# Patient Record
Sex: MALE | Race: BLACK OR AFRICAN AMERICAN | Employment: OTHER | URBAN - METROPOLITAN AREA
[De-identification: names, ages, dates, MRNs, and addresses within clinical notes are randomized per-mention and may not be internally consistent; named-entity substitution may affect disease eponyms.]

---

## 2017-01-01 ENCOUNTER — APPOINTMENT (OUTPATIENT)
Dept: GENERAL RADIOLOGY | Age: 77
DRG: 190 | End: 2017-01-01
Attending: NURSE PRACTITIONER
Payer: MEDICARE

## 2017-01-01 ENCOUNTER — APPOINTMENT (OUTPATIENT)
Dept: GENERAL RADIOLOGY | Age: 77
DRG: 190 | End: 2017-01-01
Attending: INTERNAL MEDICINE
Payer: MEDICARE

## 2017-01-01 ENCOUNTER — APPOINTMENT (OUTPATIENT)
Dept: GENERAL RADIOLOGY | Age: 77
DRG: 190 | End: 2017-01-01
Attending: EMERGENCY MEDICINE
Payer: MEDICARE

## 2017-01-01 ENCOUNTER — HOSPITAL ENCOUNTER (OUTPATIENT)
Dept: GENERAL RADIOLOGY | Age: 77
Discharge: HOME | End: 2017-08-16
Payer: MEDICARE

## 2017-01-01 ENCOUNTER — HOSPITAL ENCOUNTER (INPATIENT)
Age: 77
LOS: 5 days | Discharge: HOME HEALTH | DRG: 190 | End: 2017-07-06
Attending: EMERGENCY MEDICINE | Admitting: INTERNAL MEDICINE
Payer: MEDICARE

## 2017-01-01 VITALS
RESPIRATION RATE: 18 BRPM | WEIGHT: 199.4 LBS | OXYGEN SATURATION: 95 % | SYSTOLIC BLOOD PRESSURE: 117 MMHG | TEMPERATURE: 98.9 F | HEIGHT: 75 IN | HEART RATE: 79 BPM | DIASTOLIC BLOOD PRESSURE: 63 MMHG | BODY MASS INDEX: 24.79 KG/M2

## 2017-01-01 DIAGNOSIS — J43.2 CENTRILOBULAR EMPHYSEMA (HCC): Chronic | ICD-10-CM

## 2017-01-01 DIAGNOSIS — R09.02 HYPOXEMIA: ICD-10-CM

## 2017-01-01 DIAGNOSIS — J18.9 COMMUNITY ACQUIRED PNEUMONIA: Primary | ICD-10-CM

## 2017-01-01 DIAGNOSIS — J18.9 CAP (COMMUNITY ACQUIRED PNEUMONIA): ICD-10-CM

## 2017-01-01 DIAGNOSIS — I95.1 ORTHOSTATIC HYPOTENSION: ICD-10-CM

## 2017-01-01 DIAGNOSIS — I25.5 ISCHEMIC CARDIOMYOPATHY: Chronic | ICD-10-CM

## 2017-01-01 DIAGNOSIS — R29.6 FREQUENT FALLS: ICD-10-CM

## 2017-01-01 DIAGNOSIS — I50.40 COMBINED SYSTOLIC AND DIASTOLIC CONGESTIVE HEART FAILURE, UNSPECIFIED CONGESTIVE HEART FAILURE CHRONICITY: Chronic | ICD-10-CM

## 2017-01-01 LAB
ALBUMIN SERPL BCP-MCNC: 3.4 G/DL (ref 3.2–4.6)
ALBUMIN/GLOB SERPL: 0.8 {RATIO} (ref 1.2–3.5)
ALP SERPL-CCNC: 51 U/L (ref 50–136)
ALT SERPL-CCNC: 36 U/L (ref 12–65)
ANION GAP BLD CALC-SCNC: 10 MMOL/L (ref 7–16)
ANION GAP BLD CALC-SCNC: 10 MMOL/L (ref 7–16)
APPEARANCE UR: CLEAR
AST SERPL W P-5'-P-CCNC: 42 U/L (ref 15–37)
ATRIAL RATE: 77 BPM
BACTERIA SPEC CULT: NORMAL
BACTERIA URNS QL MICRO: 0 /HPF
BASOPHILS # BLD AUTO: 0 K/UL (ref 0–0.2)
BASOPHILS # BLD AUTO: 0 K/UL (ref 0–0.2)
BASOPHILS # BLD: 0 % (ref 0–2)
BASOPHILS # BLD: 0 % (ref 0–2)
BILIRUB SERPL-MCNC: 1.5 MG/DL (ref 0.2–1.1)
BILIRUB UR QL: NEGATIVE
BNP SERPL-MCNC: 926 PG/ML
BUN SERPL-MCNC: 17 MG/DL (ref 8–23)
BUN SERPL-MCNC: 23 MG/DL (ref 8–23)
CALCIUM SERPL-MCNC: 8.4 MG/DL (ref 8.3–10.4)
CALCIUM SERPL-MCNC: 8.6 MG/DL (ref 8.3–10.4)
CALCULATED P AXIS, ECG09: 22 DEGREES
CALCULATED R AXIS, ECG10: -53 DEGREES
CALCULATED T AXIS, ECG11: -30 DEGREES
CASTS URNS QL MICRO: 0 /LPF
CHLORIDE SERPL-SCNC: 100 MMOL/L (ref 98–107)
CHLORIDE SERPL-SCNC: 105 MMOL/L (ref 98–107)
CO2 SERPL-SCNC: 24 MMOL/L (ref 21–32)
CO2 SERPL-SCNC: 26 MMOL/L (ref 21–32)
COLOR UR: ABNORMAL
CREAT SERPL-MCNC: 1.27 MG/DL (ref 0.8–1.5)
CREAT SERPL-MCNC: 1.66 MG/DL (ref 0.8–1.5)
DIAGNOSIS, 93000: NORMAL
DIFFERENTIAL METHOD BLD: ABNORMAL
DIFFERENTIAL METHOD BLD: ABNORMAL
EOSINOPHIL # BLD: 0 K/UL (ref 0–0.8)
EOSINOPHIL # BLD: 0 K/UL (ref 0–0.8)
EOSINOPHIL NFR BLD: 0 % (ref 0.5–7.8)
EOSINOPHIL NFR BLD: 0 % (ref 0.5–7.8)
EPI CELLS #/AREA URNS HPF: ABNORMAL /HPF
ERYTHROCYTE [DISTWIDTH] IN BLOOD BY AUTOMATED COUNT: 14.3 % (ref 11.9–14.6)
ERYTHROCYTE [DISTWIDTH] IN BLOOD BY AUTOMATED COUNT: 14.4 % (ref 11.9–14.6)
GLOBULIN SER CALC-MCNC: 4.1 G/DL (ref 2.3–3.5)
GLUCOSE BLD STRIP.AUTO-MCNC: 128 MG/DL (ref 65–100)
GLUCOSE BLD STRIP.AUTO-MCNC: 133 MG/DL (ref 65–100)
GLUCOSE BLD STRIP.AUTO-MCNC: 134 MG/DL (ref 65–100)
GLUCOSE BLD STRIP.AUTO-MCNC: 139 MG/DL (ref 65–100)
GLUCOSE BLD STRIP.AUTO-MCNC: 151 MG/DL (ref 65–100)
GLUCOSE BLD STRIP.AUTO-MCNC: 158 MG/DL (ref 65–100)
GLUCOSE BLD STRIP.AUTO-MCNC: 172 MG/DL (ref 65–100)
GLUCOSE BLD STRIP.AUTO-MCNC: 175 MG/DL (ref 65–100)
GLUCOSE BLD STRIP.AUTO-MCNC: 177 MG/DL (ref 65–100)
GLUCOSE BLD STRIP.AUTO-MCNC: 184 MG/DL (ref 65–100)
GLUCOSE BLD STRIP.AUTO-MCNC: 186 MG/DL (ref 65–100)
GLUCOSE BLD STRIP.AUTO-MCNC: 205 MG/DL (ref 65–100)
GLUCOSE BLD STRIP.AUTO-MCNC: 205 MG/DL (ref 65–100)
GLUCOSE BLD STRIP.AUTO-MCNC: 226 MG/DL (ref 65–100)
GLUCOSE BLD STRIP.AUTO-MCNC: 270 MG/DL (ref 65–100)
GLUCOSE BLD STRIP.AUTO-MCNC: 95 MG/DL (ref 65–100)
GLUCOSE SERPL-MCNC: 185 MG/DL (ref 65–100)
GLUCOSE SERPL-MCNC: 97 MG/DL (ref 65–100)
GLUCOSE UR STRIP.AUTO-MCNC: 100 MG/DL
GRAM STN SPEC: NORMAL
HCT VFR BLD AUTO: 33.9 % (ref 41.1–50.3)
HCT VFR BLD AUTO: 34.9 % (ref 41.1–50.3)
HGB BLD-MCNC: 11.5 G/DL (ref 13.6–17.2)
HGB BLD-MCNC: 11.9 G/DL (ref 13.6–17.2)
HGB UR QL STRIP: ABNORMAL
IMM GRANULOCYTES # BLD: 0 K/UL (ref 0–0.5)
IMM GRANULOCYTES # BLD: 0 K/UL (ref 0–0.5)
IMM GRANULOCYTES NFR BLD AUTO: 0.1 % (ref 0–5)
IMM GRANULOCYTES NFR BLD AUTO: 0.2 % (ref 0–5)
KETONES UR QL STRIP.AUTO: NEGATIVE MG/DL
LACTATE BLD-SCNC: 1.3 MMOL/L (ref 0.5–1.9)
LEUKOCYTE ESTERASE UR QL STRIP.AUTO: NEGATIVE
LYMPHOCYTES # BLD AUTO: 17 % (ref 13–44)
LYMPHOCYTES # BLD AUTO: 9 % (ref 13–44)
LYMPHOCYTES # BLD: 0.8 K/UL (ref 0.5–4.6)
LYMPHOCYTES # BLD: 1.2 K/UL (ref 0.5–4.6)
MAGNESIUM SERPL-MCNC: 2.2 MG/DL (ref 1.8–2.4)
MCH RBC QN AUTO: 24.6 PG (ref 26.1–32.9)
MCH RBC QN AUTO: 24.9 PG (ref 26.1–32.9)
MCHC RBC AUTO-ENTMCNC: 33.9 G/DL (ref 31.4–35)
MCHC RBC AUTO-ENTMCNC: 34.1 G/DL (ref 31.4–35)
MCV RBC AUTO: 72.6 FL (ref 79.6–97.8)
MCV RBC AUTO: 73.2 FL (ref 79.6–97.8)
MONOCYTES # BLD: 0.9 K/UL (ref 0.1–1.3)
MONOCYTES # BLD: 1 K/UL (ref 0.1–1.3)
MONOCYTES NFR BLD AUTO: 10 % (ref 4–12)
MONOCYTES NFR BLD AUTO: 13 % (ref 4–12)
NEUTS SEG # BLD: 4.9 K/UL (ref 1.7–8.2)
NEUTS SEG # BLD: 7.5 K/UL (ref 1.7–8.2)
NEUTS SEG NFR BLD AUTO: 70 % (ref 43–78)
NEUTS SEG NFR BLD AUTO: 81 % (ref 43–78)
NITRITE UR QL STRIP.AUTO: NEGATIVE
P-R INTERVAL, ECG05: 208 MS
PH UR STRIP: 5.5 [PH] (ref 5–9)
PLATELET # BLD AUTO: 115 K/UL (ref 150–450)
PLATELET # BLD AUTO: 134 K/UL (ref 150–450)
PMV BLD AUTO: ABNORMAL FL (ref 10.8–14.1)
PMV BLD AUTO: ABNORMAL FL (ref 10.8–14.1)
POTASSIUM SERPL-SCNC: 3.6 MMOL/L (ref 3.5–5.1)
POTASSIUM SERPL-SCNC: 3.8 MMOL/L (ref 3.5–5.1)
PROCALCITONIN SERPL-MCNC: 0.7 NG/ML
PROCALCITONIN SERPL-MCNC: 2 NG/ML
PROCALCITONIN SERPL-MCNC: 6.9 NG/ML
PROT SERPL-MCNC: 7.5 G/DL (ref 6.3–8.2)
PROT UR STRIP-MCNC: 100 MG/DL
Q-T INTERVAL, ECG07: 382 MS
QRS DURATION, ECG06: 120 MS
QTC CALCULATION (BEZET), ECG08: 432 MS
RBC # BLD AUTO: 4.67 M/UL (ref 4.23–5.67)
RBC # BLD AUTO: 4.77 M/UL (ref 4.23–5.67)
RBC #/AREA URNS HPF: ABNORMAL /HPF
SERVICE CMNT-IMP: NORMAL
SODIUM SERPL-SCNC: 136 MMOL/L (ref 136–145)
SODIUM SERPL-SCNC: 139 MMOL/L (ref 136–145)
SP GR UR REFRACTOMETRY: 1.02 (ref 1–1.02)
TROPONIN I SERPL-MCNC: 0.02 NG/ML (ref 0.02–0.05)
UROBILINOGEN UR QL STRIP.AUTO: 1 EU/DL (ref 0.2–1)
VENTRICULAR RATE, ECG03: 77 BPM
WBC # BLD AUTO: 7 K/UL (ref 4.3–11.1)
WBC # BLD AUTO: 9.3 K/UL (ref 4.3–11.1)
WBC URNS QL MICRO: ABNORMAL /HPF

## 2017-01-01 PROCEDURE — 94640 AIRWAY INHALATION TREATMENT: CPT

## 2017-01-01 PROCEDURE — 77010033678 HC OXYGEN DAILY

## 2017-01-01 PROCEDURE — 36415 COLL VENOUS BLD VENIPUNCTURE: CPT | Performed by: INTERNAL MEDICINE

## 2017-01-01 PROCEDURE — 71020 XR CHEST PA LAT: CPT

## 2017-01-01 PROCEDURE — 81001 URINALYSIS AUTO W/SCOPE: CPT | Performed by: INTERNAL MEDICINE

## 2017-01-01 PROCEDURE — 74011250636 HC RX REV CODE- 250/636: Performed by: INTERNAL MEDICINE

## 2017-01-01 PROCEDURE — 80053 COMPREHEN METABOLIC PANEL: CPT | Performed by: EMERGENCY MEDICINE

## 2017-01-01 PROCEDURE — 99232 SBSQ HOSP IP/OBS MODERATE 35: CPT | Performed by: INTERNAL MEDICINE

## 2017-01-01 PROCEDURE — 94760 N-INVAS EAR/PLS OXIMETRY 1: CPT

## 2017-01-01 PROCEDURE — 74011250636 HC RX REV CODE- 250/636: Performed by: EMERGENCY MEDICINE

## 2017-01-01 PROCEDURE — 97116 GAIT TRAINING THERAPY: CPT

## 2017-01-01 PROCEDURE — 97530 THERAPEUTIC ACTIVITIES: CPT

## 2017-01-01 PROCEDURE — 99239 HOSP IP/OBS DSCHRG MGMT >30: CPT | Performed by: INTERNAL MEDICINE

## 2017-01-01 PROCEDURE — 74011250637 HC RX REV CODE- 250/637: Performed by: INTERNAL MEDICINE

## 2017-01-01 PROCEDURE — 84145 PROCALCITONIN (PCT): CPT | Performed by: NURSE PRACTITIONER

## 2017-01-01 PROCEDURE — 99285 EMERGENCY DEPT VISIT HI MDM: CPT | Performed by: EMERGENCY MEDICINE

## 2017-01-01 PROCEDURE — 65270000029 HC RM PRIVATE

## 2017-01-01 PROCEDURE — 82962 GLUCOSE BLOOD TEST: CPT

## 2017-01-01 PROCEDURE — 83880 ASSAY OF NATRIURETIC PEPTIDE: CPT | Performed by: NURSE PRACTITIONER

## 2017-01-01 PROCEDURE — 84145 PROCALCITONIN (PCT): CPT | Performed by: INTERNAL MEDICINE

## 2017-01-01 PROCEDURE — 97162 PT EVAL MOD COMPLEX 30 MIN: CPT

## 2017-01-01 PROCEDURE — 93005 ELECTROCARDIOGRAM TRACING: CPT | Performed by: EMERGENCY MEDICINE

## 2017-01-01 PROCEDURE — 87040 BLOOD CULTURE FOR BACTERIA: CPT | Performed by: EMERGENCY MEDICINE

## 2017-01-01 PROCEDURE — 99233 SBSQ HOSP IP/OBS HIGH 50: CPT | Performed by: INTERNAL MEDICINE

## 2017-01-01 PROCEDURE — 74011000255 HC RX REV CODE- 255: Performed by: INTERNAL MEDICINE

## 2017-01-01 PROCEDURE — 92526 ORAL FUNCTION THERAPY: CPT

## 2017-01-01 PROCEDURE — 80048 BASIC METABOLIC PNL TOTAL CA: CPT | Performed by: INTERNAL MEDICINE

## 2017-01-01 PROCEDURE — 92611 MOTION FLUOROSCOPY/SWALLOW: CPT

## 2017-01-01 PROCEDURE — 83605 ASSAY OF LACTIC ACID: CPT

## 2017-01-01 PROCEDURE — 96365 THER/PROPH/DIAG IV INF INIT: CPT | Performed by: EMERGENCY MEDICINE

## 2017-01-01 PROCEDURE — 85025 COMPLETE CBC W/AUTO DIFF WBC: CPT | Performed by: EMERGENCY MEDICINE

## 2017-01-01 PROCEDURE — 36415 COLL VENOUS BLD VENIPUNCTURE: CPT | Performed by: NURSE PRACTITIONER

## 2017-01-01 PROCEDURE — 74230 X-RAY XM SWLNG FUNCJ C+: CPT

## 2017-01-01 PROCEDURE — 96361 HYDRATE IV INFUSION ADD-ON: CPT | Performed by: EMERGENCY MEDICINE

## 2017-01-01 PROCEDURE — 74011000250 HC RX REV CODE- 250: Performed by: EMERGENCY MEDICINE

## 2017-01-01 PROCEDURE — 84484 ASSAY OF TROPONIN QUANT: CPT | Performed by: EMERGENCY MEDICINE

## 2017-01-01 PROCEDURE — 83735 ASSAY OF MAGNESIUM: CPT | Performed by: NURSE PRACTITIONER

## 2017-01-01 PROCEDURE — 71010 XR CHEST SNGL V: CPT

## 2017-01-01 PROCEDURE — 85025 COMPLETE CBC W/AUTO DIFF WBC: CPT | Performed by: INTERNAL MEDICINE

## 2017-01-01 PROCEDURE — 87070 CULTURE OTHR SPECIMN AEROBIC: CPT | Performed by: INTERNAL MEDICINE

## 2017-01-01 PROCEDURE — 99223 1ST HOSP IP/OBS HIGH 75: CPT | Performed by: INTERNAL MEDICINE

## 2017-01-01 RX ORDER — ALPRAZOLAM 0.5 MG/1
0.25 TABLET ORAL
Status: DISCONTINUED | OUTPATIENT
Start: 2017-01-01 | End: 2017-01-01 | Stop reason: HOSPADM

## 2017-01-01 RX ORDER — GLIMEPIRIDE 2 MG/1
2 TABLET ORAL
Status: DISCONTINUED | OUTPATIENT
Start: 2017-01-01 | End: 2017-01-01 | Stop reason: HOSPADM

## 2017-01-01 RX ORDER — GUAIFENESIN 100 MG/5ML
81 LIQUID (ML) ORAL DAILY
Status: DISCONTINUED | OUTPATIENT
Start: 2017-01-01 | End: 2017-01-01 | Stop reason: HOSPADM

## 2017-01-01 RX ORDER — LEVOFLOXACIN 5 MG/ML
750 INJECTION, SOLUTION INTRAVENOUS EVERY 24 HOURS
Status: DISCONTINUED | OUTPATIENT
Start: 2017-01-01 | End: 2017-01-01 | Stop reason: HOSPADM

## 2017-01-01 RX ORDER — CEFDINIR 300 MG/1
300 CAPSULE ORAL 2 TIMES DAILY
Qty: 2 CAP | Refills: 0 | Status: SHIPPED | OUTPATIENT
Start: 2017-01-01 | End: 2017-01-01

## 2017-01-01 RX ORDER — GUAIFENESIN 600 MG/1
600 TABLET, EXTENDED RELEASE ORAL 2 TIMES DAILY
Status: DISCONTINUED | OUTPATIENT
Start: 2017-01-01 | End: 2017-01-01 | Stop reason: HOSPADM

## 2017-01-01 RX ORDER — LEVOFLOXACIN 5 MG/ML
750 INJECTION, SOLUTION INTRAVENOUS
Status: COMPLETED | OUTPATIENT
Start: 2017-01-01 | End: 2017-01-01

## 2017-01-01 RX ORDER — NITROGLYCERIN 0.4 MG/1
0.4 TABLET SUBLINGUAL AS NEEDED
Status: DISCONTINUED | OUTPATIENT
Start: 2017-01-01 | End: 2017-01-01 | Stop reason: HOSPADM

## 2017-01-01 RX ORDER — SODIUM CHLORIDE 0.9 % (FLUSH) 0.9 %
5-10 SYRINGE (ML) INJECTION EVERY 8 HOURS
Status: DISCONTINUED | OUTPATIENT
Start: 2017-01-01 | End: 2017-01-01 | Stop reason: HOSPADM

## 2017-01-01 RX ORDER — ZOLPIDEM TARTRATE 5 MG/1
10 TABLET ORAL
Status: DISCONTINUED | OUTPATIENT
Start: 2017-01-01 | End: 2017-01-01 | Stop reason: HOSPADM

## 2017-01-01 RX ORDER — IPRATROPIUM BROMIDE AND ALBUTEROL SULFATE 2.5; .5 MG/3ML; MG/3ML
3 SOLUTION RESPIRATORY (INHALATION)
Status: COMPLETED | OUTPATIENT
Start: 2017-01-01 | End: 2017-01-01

## 2017-01-01 RX ORDER — LEVOFLOXACIN 5 MG/ML
750 INJECTION, SOLUTION INTRAVENOUS
Status: DISCONTINUED | OUTPATIENT
Start: 2017-01-01 | End: 2017-01-01

## 2017-01-01 RX ORDER — EZETIMIBE 10 MG/1
10 TABLET ORAL DAILY
Qty: 1 TAB | Refills: 0 | Status: SHIPPED
Start: 2017-01-01 | End: 2017-01-01

## 2017-01-01 RX ORDER — CLOPIDOGREL BISULFATE 75 MG/1
75 TABLET ORAL DAILY
COMMUNITY
End: 2018-01-01

## 2017-01-01 RX ORDER — ATORVASTATIN CALCIUM 80 MG/1
80 TABLET, FILM COATED ORAL
Qty: 1 TAB | Refills: 0 | Status: SHIPPED
Start: 2017-01-01 | End: 2017-01-01

## 2017-01-01 RX ORDER — ALBUTEROL SULFATE 90 UG/1
2 AEROSOL, METERED RESPIRATORY (INHALATION)
Status: DISCONTINUED | OUTPATIENT
Start: 2017-01-01 | End: 2017-01-01 | Stop reason: HOSPADM

## 2017-01-01 RX ORDER — CARVEDILOL 25 MG/1
25 TABLET ORAL 2 TIMES DAILY WITH MEALS
Status: DISCONTINUED | OUTPATIENT
Start: 2017-01-01 | End: 2017-01-01 | Stop reason: HOSPADM

## 2017-01-01 RX ORDER — LEVOTHYROXINE SODIUM 75 UG/1
75 TABLET ORAL
Status: DISCONTINUED | OUTPATIENT
Start: 2017-01-01 | End: 2017-01-01 | Stop reason: HOSPADM

## 2017-01-01 RX ORDER — SODIUM CHLORIDE 9 MG/ML
125 INJECTION, SOLUTION INTRAVENOUS ONCE
Status: COMPLETED | OUTPATIENT
Start: 2017-01-01 | End: 2017-01-01

## 2017-01-01 RX ORDER — ROSUVASTATIN CALCIUM 20 MG/1
20 TABLET, COATED ORAL
COMMUNITY

## 2017-01-01 RX ORDER — EZETIMIBE 10 MG/1
10 TABLET ORAL DAILY
Status: DISCONTINUED | OUTPATIENT
Start: 2017-01-01 | End: 2017-01-01 | Stop reason: HOSPADM

## 2017-01-01 RX ORDER — NALOXONE HYDROCHLORIDE 0.4 MG/ML
0.4 INJECTION, SOLUTION INTRAMUSCULAR; INTRAVENOUS; SUBCUTANEOUS AS NEEDED
Status: DISCONTINUED | OUTPATIENT
Start: 2017-01-01 | End: 2017-01-01 | Stop reason: HOSPADM

## 2017-01-01 RX ORDER — POTASSIUM CHLORIDE 20 MEQ/1
20 TABLET, EXTENDED RELEASE ORAL DAILY
Qty: 1 TAB | Refills: 0 | Status: SHIPPED
Start: 2017-01-01 | End: 2018-01-01

## 2017-01-01 RX ORDER — ATORVASTATIN CALCIUM 40 MG/1
80 TABLET, FILM COATED ORAL
Status: DISCONTINUED | OUTPATIENT
Start: 2017-01-01 | End: 2017-01-01 | Stop reason: HOSPADM

## 2017-01-01 RX ORDER — FUROSEMIDE 40 MG/1
40 TABLET ORAL DAILY
Status: DISCONTINUED | OUTPATIENT
Start: 2017-01-01 | End: 2017-01-01 | Stop reason: HOSPADM

## 2017-01-01 RX ORDER — AMOXICILLIN AND CLAVULANATE POTASSIUM 875; 125 MG/1; MG/1
TABLET, FILM COATED ORAL EVERY 12 HOURS
COMMUNITY
End: 2017-01-01

## 2017-01-01 RX ORDER — ACETAMINOPHEN 325 MG/1
650 TABLET ORAL
Status: DISCONTINUED | OUTPATIENT
Start: 2017-01-01 | End: 2017-01-01 | Stop reason: HOSPADM

## 2017-01-01 RX ORDER — SODIUM CHLORIDE 0.9 % (FLUSH) 0.9 %
5-10 SYRINGE (ML) INJECTION AS NEEDED
Status: DISCONTINUED | OUTPATIENT
Start: 2017-01-01 | End: 2017-01-01 | Stop reason: HOSPADM

## 2017-01-01 RX ORDER — LOSARTAN POTASSIUM 50 MG/1
50 TABLET ORAL DAILY
Status: DISCONTINUED | OUTPATIENT
Start: 2017-01-01 | End: 2017-01-01 | Stop reason: HOSPADM

## 2017-01-01 RX ORDER — ZOLPIDEM TARTRATE 10 MG/1
10 TABLET ORAL
Qty: 1 TAB | Refills: 0 | Status: SHIPPED
Start: 2017-01-01 | End: 2018-01-01

## 2017-01-01 RX ORDER — SODIUM CHLORIDE 9 MG/ML
1000 INJECTION, SOLUTION INTRAVENOUS ONCE
Status: COMPLETED | OUTPATIENT
Start: 2017-01-01 | End: 2017-01-01

## 2017-01-01 RX ORDER — ENOXAPARIN SODIUM 100 MG/ML
40 INJECTION SUBCUTANEOUS EVERY 24 HOURS
Status: DISCONTINUED | OUTPATIENT
Start: 2017-01-01 | End: 2017-01-01 | Stop reason: HOSPADM

## 2017-01-01 RX ORDER — MONTELUKAST SODIUM 10 MG/1
10 TABLET ORAL DAILY
Status: DISCONTINUED | OUTPATIENT
Start: 2017-01-01 | End: 2017-01-01 | Stop reason: HOSPADM

## 2017-01-01 RX ORDER — POTASSIUM CHLORIDE 20 MEQ/1
20 TABLET, EXTENDED RELEASE ORAL DAILY
Status: DISCONTINUED | OUTPATIENT
Start: 2017-01-01 | End: 2017-01-01 | Stop reason: HOSPADM

## 2017-01-01 RX ADMIN — GUAIFENESIN 600 MG: 600 TABLET, EXTENDED RELEASE ORAL at 09:36

## 2017-01-01 RX ADMIN — MONTELUKAST SODIUM 10 MG: 10 TABLET, FILM COATED ORAL at 08:15

## 2017-01-01 RX ADMIN — POTASSIUM CHLORIDE 20 MEQ: 20 TABLET, EXTENDED RELEASE ORAL at 09:37

## 2017-01-01 RX ADMIN — LOSARTAN POTASSIUM 50 MG: 50 TABLET ORAL at 09:37

## 2017-01-01 RX ADMIN — ASPIRIN 81 MG 81 MG: 81 TABLET ORAL at 09:49

## 2017-01-01 RX ADMIN — Medication 5 ML: at 05:46

## 2017-01-01 RX ADMIN — LEVOFLOXACIN 750 MG: 5 INJECTION, SOLUTION INTRAVENOUS at 15:02

## 2017-01-01 RX ADMIN — GUAIFENESIN 600 MG: 600 TABLET, EXTENDED RELEASE ORAL at 17:10

## 2017-01-01 RX ADMIN — EZETIMIBE 10 MG: 10 TABLET ORAL at 09:49

## 2017-01-01 RX ADMIN — BARIUM SULFATE 45 ML: 980 POWDER, FOR SUSPENSION ORAL at 09:14

## 2017-01-01 RX ADMIN — Medication 5 ML: at 22:00

## 2017-01-01 RX ADMIN — ACETAMINOPHEN 650 MG: 325 TABLET ORAL at 08:14

## 2017-01-01 RX ADMIN — LEVOTHYROXINE SODIUM 75 MCG: 75 TABLET ORAL at 06:09

## 2017-01-01 RX ADMIN — FUROSEMIDE 40 MG: 40 TABLET ORAL at 09:59

## 2017-01-01 RX ADMIN — SODIUM CHLORIDE 125 ML/HR: 900 INJECTION, SOLUTION INTRAVENOUS at 12:49

## 2017-01-01 RX ADMIN — TICAGRELOR 90 MG: 90 TABLET ORAL at 05:47

## 2017-01-01 RX ADMIN — ASPIRIN 81 MG 81 MG: 81 TABLET ORAL at 09:37

## 2017-01-01 RX ADMIN — LEVOFLOXACIN 750 MG: 5 INJECTION, SOLUTION INTRAVENOUS at 14:55

## 2017-01-01 RX ADMIN — TICAGRELOR 90 MG: 90 TABLET ORAL at 17:18

## 2017-01-01 RX ADMIN — TICAGRELOR 90 MG: 90 TABLET ORAL at 17:49

## 2017-01-01 RX ADMIN — ASPIRIN 81 MG 81 MG: 81 TABLET ORAL at 08:15

## 2017-01-01 RX ADMIN — LEVOTHYROXINE SODIUM 75 MCG: 75 TABLET ORAL at 05:56

## 2017-01-01 RX ADMIN — FUROSEMIDE 40 MG: 40 TABLET ORAL at 09:36

## 2017-01-01 RX ADMIN — Medication 5 ML: at 17:13

## 2017-01-01 RX ADMIN — TICAGRELOR 90 MG: 90 TABLET ORAL at 05:57

## 2017-01-01 RX ADMIN — ALBUTEROL SULFATE 2 PUFF: 90 AEROSOL, METERED RESPIRATORY (INHALATION) at 08:00

## 2017-01-01 RX ADMIN — ASPIRIN 81 MG 81 MG: 81 TABLET ORAL at 08:27

## 2017-01-01 RX ADMIN — MONTELUKAST SODIUM 10 MG: 10 TABLET, FILM COATED ORAL at 09:59

## 2017-01-01 RX ADMIN — Medication 5 ML: at 05:47

## 2017-01-01 RX ADMIN — Medication 5 ML: at 05:58

## 2017-01-01 RX ADMIN — ACETAMINOPHEN 650 MG: 325 TABLET ORAL at 21:10

## 2017-01-01 RX ADMIN — GLIMEPIRIDE 2 MG: 2 TABLET ORAL at 05:46

## 2017-01-01 RX ADMIN — ACETAMINOPHEN 650 MG: 325 TABLET ORAL at 17:57

## 2017-01-01 RX ADMIN — ENOXAPARIN SODIUM 40 MG: 40 INJECTION SUBCUTANEOUS at 17:10

## 2017-01-01 RX ADMIN — ALBUTEROL SULFATE 2 PUFF: 90 AEROSOL, METERED RESPIRATORY (INHALATION) at 01:20

## 2017-01-01 RX ADMIN — IPRATROPIUM BROMIDE AND ALBUTEROL SULFATE 3 ML: .5; 3 SOLUTION RESPIRATORY (INHALATION) at 12:55

## 2017-01-01 RX ADMIN — ALBUTEROL SULFATE 2 PUFF: 90 AEROSOL, METERED RESPIRATORY (INHALATION) at 01:34

## 2017-01-01 RX ADMIN — GUAIFENESIN 600 MG: 600 TABLET, EXTENDED RELEASE ORAL at 09:49

## 2017-01-01 RX ADMIN — MONTELUKAST SODIUM 10 MG: 10 TABLET, FILM COATED ORAL at 09:49

## 2017-01-01 RX ADMIN — Medication 10 ML: at 17:51

## 2017-01-01 RX ADMIN — GLIMEPIRIDE 2 MG: 2 TABLET ORAL at 05:57

## 2017-01-01 RX ADMIN — Medication 5 ML: at 21:15

## 2017-01-01 RX ADMIN — LEVOFLOXACIN 750 MG: 5 INJECTION, SOLUTION INTRAVENOUS at 15:00

## 2017-01-01 RX ADMIN — CARVEDILOL 25 MG: 25 TABLET, FILM COATED ORAL at 17:10

## 2017-01-01 RX ADMIN — ATORVASTATIN CALCIUM 80 MG: 40 TABLET, FILM COATED ORAL at 21:23

## 2017-01-01 RX ADMIN — MONTELUKAST SODIUM 10 MG: 10 TABLET, FILM COATED ORAL at 09:36

## 2017-01-01 RX ADMIN — ALBUTEROL SULFATE 2 PUFF: 90 AEROSOL, METERED RESPIRATORY (INHALATION) at 20:18

## 2017-01-01 RX ADMIN — POTASSIUM CHLORIDE 20 MEQ: 20 TABLET, EXTENDED RELEASE ORAL at 08:15

## 2017-01-01 RX ADMIN — TICAGRELOR 90 MG: 90 TABLET ORAL at 05:54

## 2017-01-01 RX ADMIN — FUROSEMIDE 40 MG: 40 TABLET ORAL at 08:26

## 2017-01-01 RX ADMIN — CARVEDILOL 25 MG: 25 TABLET, FILM COATED ORAL at 09:49

## 2017-01-01 RX ADMIN — ALBUTEROL SULFATE 2 PUFF: 90 AEROSOL, METERED RESPIRATORY (INHALATION) at 08:02

## 2017-01-01 RX ADMIN — LEVOTHYROXINE SODIUM 75 MCG: 75 TABLET ORAL at 05:46

## 2017-01-01 RX ADMIN — TICAGRELOR 90 MG: 90 TABLET ORAL at 16:25

## 2017-01-01 RX ADMIN — Medication 5 ML: at 14:00

## 2017-01-01 RX ADMIN — ENOXAPARIN SODIUM 40 MG: 40 INJECTION SUBCUTANEOUS at 16:27

## 2017-01-01 RX ADMIN — ALBUTEROL SULFATE 2 PUFF: 90 AEROSOL, METERED RESPIRATORY (INHALATION) at 20:41

## 2017-01-01 RX ADMIN — CARVEDILOL 25 MG: 25 TABLET, FILM COATED ORAL at 09:36

## 2017-01-01 RX ADMIN — TICAGRELOR 90 MG: 90 TABLET ORAL at 05:46

## 2017-01-01 RX ADMIN — BARIUM SULFATE 15 ML: 400 PASTE ORAL at 09:07

## 2017-01-01 RX ADMIN — POTASSIUM CHLORIDE 20 MEQ: 20 TABLET, EXTENDED RELEASE ORAL at 09:49

## 2017-01-01 RX ADMIN — GLIMEPIRIDE 2 MG: 2 TABLET ORAL at 05:56

## 2017-01-01 RX ADMIN — EZETIMIBE 10 MG: 10 TABLET ORAL at 08:26

## 2017-01-01 RX ADMIN — GLIMEPIRIDE 2 MG: 2 TABLET ORAL at 05:48

## 2017-01-01 RX ADMIN — Medication 5 ML: at 21:18

## 2017-01-01 RX ADMIN — Medication 10 ML: at 15:02

## 2017-01-01 RX ADMIN — ALBUTEROL SULFATE 2 PUFF: 90 AEROSOL, METERED RESPIRATORY (INHALATION) at 14:09

## 2017-01-01 RX ADMIN — LOSARTAN POTASSIUM 50 MG: 50 TABLET ORAL at 10:00

## 2017-01-01 RX ADMIN — LEVOTHYROXINE SODIUM 75 MCG: 75 TABLET ORAL at 05:57

## 2017-01-01 RX ADMIN — ATORVASTATIN CALCIUM 80 MG: 40 TABLET, FILM COATED ORAL at 21:18

## 2017-01-01 RX ADMIN — ALBUTEROL SULFATE 2 PUFF: 90 AEROSOL, METERED RESPIRATORY (INHALATION) at 08:05

## 2017-01-01 RX ADMIN — CARVEDILOL 25 MG: 25 TABLET, FILM COATED ORAL at 16:26

## 2017-01-01 RX ADMIN — POTASSIUM CHLORIDE 20 MEQ: 20 TABLET, EXTENDED RELEASE ORAL at 09:59

## 2017-01-01 RX ADMIN — ALBUTEROL SULFATE 2 PUFF: 90 AEROSOL, METERED RESPIRATORY (INHALATION) at 20:00

## 2017-01-01 RX ADMIN — GUAIFENESIN 600 MG: 600 TABLET, EXTENDED RELEASE ORAL at 17:04

## 2017-01-01 RX ADMIN — Medication 5 ML: at 17:05

## 2017-01-01 RX ADMIN — TICAGRELOR 90 MG: 90 TABLET ORAL at 06:09

## 2017-01-01 RX ADMIN — CARVEDILOL 25 MG: 25 TABLET, FILM COATED ORAL at 08:14

## 2017-01-01 RX ADMIN — FUROSEMIDE 40 MG: 40 TABLET ORAL at 09:49

## 2017-01-01 RX ADMIN — ALBUTEROL SULFATE 2 PUFF: 90 AEROSOL, METERED RESPIRATORY (INHALATION) at 17:14

## 2017-01-01 RX ADMIN — LOSARTAN POTASSIUM 50 MG: 50 TABLET ORAL at 09:49

## 2017-01-01 RX ADMIN — GUAIFENESIN 600 MG: 600 TABLET, EXTENDED RELEASE ORAL at 17:18

## 2017-01-01 RX ADMIN — CARVEDILOL 25 MG: 25 TABLET, FILM COATED ORAL at 17:50

## 2017-01-01 RX ADMIN — EZETIMIBE 10 MG: 10 TABLET ORAL at 10:00

## 2017-01-01 RX ADMIN — CARVEDILOL 25 MG: 25 TABLET, FILM COATED ORAL at 08:26

## 2017-01-01 RX ADMIN — ALBUTEROL SULFATE 2 PUFF: 90 AEROSOL, METERED RESPIRATORY (INHALATION) at 01:53

## 2017-01-01 RX ADMIN — Medication 5 ML: at 06:09

## 2017-01-01 RX ADMIN — SODIUM CHLORIDE 1000 ML: 900 INJECTION, SOLUTION INTRAVENOUS at 12:49

## 2017-01-01 RX ADMIN — ALBUTEROL SULFATE 2 PUFF: 90 AEROSOL, METERED RESPIRATORY (INHALATION) at 08:14

## 2017-01-01 RX ADMIN — POTASSIUM CHLORIDE 20 MEQ: 20 TABLET, EXTENDED RELEASE ORAL at 08:26

## 2017-01-01 RX ADMIN — LEVOFLOXACIN 750 MG: 5 INJECTION, SOLUTION INTRAVENOUS at 17:05

## 2017-01-01 RX ADMIN — GUAIFENESIN 600 MG: 600 TABLET, EXTENDED RELEASE ORAL at 08:15

## 2017-01-01 RX ADMIN — ENOXAPARIN SODIUM 40 MG: 40 INJECTION SUBCUTANEOUS at 17:04

## 2017-01-01 RX ADMIN — GUAIFENESIN 600 MG: 600 TABLET, EXTENDED RELEASE ORAL at 16:26

## 2017-01-01 RX ADMIN — MONTELUKAST SODIUM 10 MG: 10 TABLET, FILM COATED ORAL at 08:26

## 2017-01-01 RX ADMIN — LEVOTHYROXINE SODIUM 75 MCG: 75 TABLET ORAL at 05:48

## 2017-01-01 RX ADMIN — ALBUTEROL SULFATE 2 PUFF: 90 AEROSOL, METERED RESPIRATORY (INHALATION) at 14:00

## 2017-01-01 RX ADMIN — CARVEDILOL 25 MG: 25 TABLET, FILM COATED ORAL at 17:18

## 2017-01-01 RX ADMIN — ENOXAPARIN SODIUM 40 MG: 40 INJECTION SUBCUTANEOUS at 17:50

## 2017-01-01 RX ADMIN — GUAIFENESIN 600 MG: 600 TABLET, EXTENDED RELEASE ORAL at 09:59

## 2017-01-01 RX ADMIN — Medication 5 ML: at 21:12

## 2017-01-01 RX ADMIN — ACETAMINOPHEN 650 MG: 325 TABLET ORAL at 10:00

## 2017-01-01 RX ADMIN — ALBUTEROL SULFATE 2 PUFF: 90 AEROSOL, METERED RESPIRATORY (INHALATION) at 20:15

## 2017-01-01 RX ADMIN — ALBUTEROL SULFATE 2 PUFF: 90 AEROSOL, METERED RESPIRATORY (INHALATION) at 14:04

## 2017-01-01 RX ADMIN — GLIMEPIRIDE 2 MG: 2 TABLET ORAL at 06:09

## 2017-01-01 RX ADMIN — TICAGRELOR 90 MG: 90 TABLET ORAL at 17:05

## 2017-01-01 RX ADMIN — ASPIRIN 81 MG 81 MG: 81 TABLET ORAL at 10:00

## 2017-01-01 RX ADMIN — EZETIMIBE 10 MG: 10 TABLET ORAL at 09:36

## 2017-01-01 RX ADMIN — FUROSEMIDE 40 MG: 40 TABLET ORAL at 08:15

## 2017-01-01 RX ADMIN — ALBUTEROL SULFATE 2 PUFF: 90 AEROSOL, METERED RESPIRATORY (INHALATION) at 01:30

## 2017-01-01 RX ADMIN — GUAIFENESIN 600 MG: 600 TABLET, EXTENDED RELEASE ORAL at 17:50

## 2017-01-01 RX ADMIN — ALBUTEROL SULFATE 2 PUFF: 90 AEROSOL, METERED RESPIRATORY (INHALATION) at 14:31

## 2017-01-01 RX ADMIN — LEVOFLOXACIN 750 MG: 5 INJECTION, SOLUTION INTRAVENOUS at 14:02

## 2017-01-01 RX ADMIN — GUAIFENESIN 600 MG: 600 TABLET, EXTENDED RELEASE ORAL at 08:26

## 2017-01-01 RX ADMIN — Medication 5 ML: at 21:23

## 2017-01-01 RX ADMIN — TICAGRELOR 90 MG: 90 TABLET ORAL at 17:10

## 2017-01-01 RX ADMIN — LOSARTAN POTASSIUM 50 MG: 50 TABLET ORAL at 08:14

## 2017-01-01 RX ADMIN — LOSARTAN POTASSIUM 50 MG: 50 TABLET ORAL at 08:26

## 2017-01-01 RX ADMIN — ALBUTEROL SULFATE 2 PUFF: 90 AEROSOL, METERED RESPIRATORY (INHALATION) at 02:41

## 2017-01-01 RX ADMIN — EZETIMIBE 10 MG: 10 TABLET ORAL at 08:14

## 2017-01-01 RX ADMIN — ENOXAPARIN SODIUM 40 MG: 40 INJECTION SUBCUTANEOUS at 17:19

## 2017-01-01 RX ADMIN — CARVEDILOL 25 MG: 25 TABLET, FILM COATED ORAL at 09:59

## 2017-01-01 RX ADMIN — ALBUTEROL SULFATE 2 PUFF: 90 AEROSOL, METERED RESPIRATORY (INHALATION) at 21:02

## 2017-02-15 ENCOUNTER — APPOINTMENT (OUTPATIENT)
Dept: GENERAL RADIOLOGY | Age: 77
End: 2017-02-15
Attending: EMERGENCY MEDICINE
Payer: MEDICARE

## 2017-02-15 ENCOUNTER — HOSPITAL ENCOUNTER (EMERGENCY)
Age: 77
Discharge: HOME | End: 2017-02-15
Attending: EMERGENCY MEDICINE
Payer: MEDICARE

## 2017-02-15 VITALS
OXYGEN SATURATION: 94 % | HEIGHT: 75 IN | SYSTOLIC BLOOD PRESSURE: 142 MMHG | RESPIRATION RATE: 18 BRPM | HEART RATE: 96 BPM | WEIGHT: 220 LBS | BODY MASS INDEX: 27.35 KG/M2 | TEMPERATURE: 98 F | DIASTOLIC BLOOD PRESSURE: 76 MMHG

## 2017-02-15 DIAGNOSIS — R50.9 ACUTE FEBRILE ILLNESS: Primary | ICD-10-CM

## 2017-02-15 DIAGNOSIS — J20.9 ACUTE BRONCHITIS WITH BRONCHOSPASM: ICD-10-CM

## 2017-02-15 LAB
ALBUMIN SERPL BCP-MCNC: 4.2 G/DL (ref 3.2–4.6)
ALBUMIN/GLOB SERPL: 1 {RATIO} (ref 1.2–3.5)
ALP SERPL-CCNC: 58 U/L (ref 50–136)
ALT SERPL-CCNC: 23 U/L (ref 12–65)
ANION GAP BLD CALC-SCNC: 11 MMOL/L (ref 7–16)
AST SERPL W P-5'-P-CCNC: 23 U/L (ref 15–37)
BASOPHILS # BLD AUTO: 0 K/UL (ref 0–0.2)
BASOPHILS # BLD: 0 % (ref 0–2)
BILIRUB SERPL-MCNC: 0.9 MG/DL (ref 0.2–1.1)
BNP SERPL-MCNC: 1445 PG/ML
BUN SERPL-MCNC: 14 MG/DL (ref 8–23)
CALCIUM SERPL-MCNC: 9 MG/DL (ref 8.3–10.4)
CHLORIDE SERPL-SCNC: 102 MMOL/L (ref 98–107)
CO2 SERPL-SCNC: 26 MMOL/L (ref 21–32)
CREAT SERPL-MCNC: 1.47 MG/DL (ref 0.8–1.5)
DIFFERENTIAL METHOD BLD: ABNORMAL
EOSINOPHIL # BLD: 0 K/UL (ref 0–0.8)
EOSINOPHIL NFR BLD: 0 % (ref 0.5–7.8)
ERYTHROCYTE [DISTWIDTH] IN BLOOD BY AUTOMATED COUNT: 14.6 % (ref 11.9–14.6)
GLOBULIN SER CALC-MCNC: 4.1 G/DL (ref 2.3–3.5)
GLUCOSE SERPL-MCNC: 119 MG/DL (ref 65–100)
HCT VFR BLD AUTO: 41 % (ref 41.1–50.3)
HGB BLD-MCNC: 13.3 G/DL (ref 13.6–17.2)
IMM GRANULOCYTES # BLD: 0 K/UL (ref 0–0.5)
IMM GRANULOCYTES NFR BLD AUTO: 0.1 % (ref 0–5)
INR BLD: 1.5 (ref 0.9–1.2)
LACTATE BLD-SCNC: 1.1 MMOL/L (ref 0.5–1.9)
LYMPHOCYTES # BLD AUTO: 16 % (ref 13–44)
LYMPHOCYTES # BLD: 1.1 K/UL (ref 0.5–4.6)
MCH RBC QN AUTO: 24.2 PG (ref 26.1–32.9)
MCHC RBC AUTO-ENTMCNC: 32.4 G/DL (ref 31.4–35)
MCV RBC AUTO: 74.7 FL (ref 79.6–97.8)
MONOCYTES # BLD: 1.1 K/UL (ref 0.1–1.3)
MONOCYTES NFR BLD AUTO: 16 % (ref 4–12)
NEUTS SEG # BLD: 4.8 K/UL (ref 1.7–8.2)
NEUTS SEG NFR BLD AUTO: 68 % (ref 43–78)
PLATELET # BLD AUTO: 154 K/UL (ref 150–450)
PMV BLD AUTO: 11 FL (ref 10.8–14.1)
POTASSIUM SERPL-SCNC: 4.6 MMOL/L (ref 3.5–5.1)
PROT SERPL-MCNC: 8.3 G/DL (ref 6.3–8.2)
PT BLD: 17.6 SECS (ref 9.6–11.6)
RBC # BLD AUTO: 5.49 M/UL (ref 4.23–5.67)
SODIUM SERPL-SCNC: 139 MMOL/L (ref 136–145)
TROPONIN I BLD-MCNC: 0 NG/ML (ref 0–0.08)
WBC # BLD AUTO: 7 K/UL (ref 4.3–11.1)

## 2017-02-15 PROCEDURE — 80053 COMPREHEN METABOLIC PANEL: CPT | Performed by: EMERGENCY MEDICINE

## 2017-02-15 PROCEDURE — 96372 THER/PROPH/DIAG INJ SC/IM: CPT | Performed by: EMERGENCY MEDICINE

## 2017-02-15 PROCEDURE — 74011250636 HC RX REV CODE- 250/636: Performed by: EMERGENCY MEDICINE

## 2017-02-15 PROCEDURE — 85025 COMPLETE CBC W/AUTO DIFF WBC: CPT | Performed by: EMERGENCY MEDICINE

## 2017-02-15 PROCEDURE — 83880 ASSAY OF NATRIURETIC PEPTIDE: CPT | Performed by: EMERGENCY MEDICINE

## 2017-02-15 PROCEDURE — 99285 EMERGENCY DEPT VISIT HI MDM: CPT | Performed by: EMERGENCY MEDICINE

## 2017-02-15 PROCEDURE — 93005 ELECTROCARDIOGRAM TRACING: CPT | Performed by: EMERGENCY MEDICINE

## 2017-02-15 PROCEDURE — 85610 PROTHROMBIN TIME: CPT

## 2017-02-15 PROCEDURE — 94640 AIRWAY INHALATION TREATMENT: CPT

## 2017-02-15 PROCEDURE — 71020 XR CHEST PA LAT: CPT

## 2017-02-15 PROCEDURE — 83605 ASSAY OF LACTIC ACID: CPT

## 2017-02-15 PROCEDURE — 74011000250 HC RX REV CODE- 250: Performed by: EMERGENCY MEDICINE

## 2017-02-15 PROCEDURE — 84484 ASSAY OF TROPONIN QUANT: CPT

## 2017-02-15 RX ORDER — DEXAMETHASONE SODIUM PHOSPHATE 100 MG/10ML
10 INJECTION INTRAMUSCULAR; INTRAVENOUS
Status: COMPLETED | OUTPATIENT
Start: 2017-02-15 | End: 2017-02-15

## 2017-02-15 RX ORDER — IPRATROPIUM BROMIDE AND ALBUTEROL SULFATE 2.5; .5 MG/3ML; MG/3ML
3 SOLUTION RESPIRATORY (INHALATION)
Status: COMPLETED | OUTPATIENT
Start: 2017-02-15 | End: 2017-02-15

## 2017-02-15 RX ORDER — AZITHROMYCIN 250 MG/1
TABLET, FILM COATED ORAL
Qty: 6 TAB | Refills: 0 | Status: SHIPPED | OUTPATIENT
Start: 2017-02-15 | End: 2017-01-01

## 2017-02-15 RX ORDER — DEXAMETHASONE SODIUM PHOSPHATE 100 MG/10ML
10 INJECTION INTRAMUSCULAR; INTRAVENOUS
Status: DISCONTINUED | OUTPATIENT
Start: 2017-02-15 | End: 2017-02-15

## 2017-02-15 RX ORDER — HYDROCODONE BITARTRATE AND HOMATROPINE METHYLBROMIDE ORAL SOLUTION 5; 1.5 MG/5ML; MG/5ML
5 LIQUID ORAL
Qty: 60 ML | Refills: 0 | Status: ON HOLD | OUTPATIENT
Start: 2017-02-15 | End: 2017-01-01

## 2017-02-15 RX ADMIN — IPRATROPIUM BROMIDE AND ALBUTEROL SULFATE 3 ML: 2.5; .5 SOLUTION RESPIRATORY (INHALATION) at 17:59

## 2017-02-15 RX ADMIN — DEXAMETHASONE SODIUM PHOSPHATE 10 MG: 10 INJECTION INTRAMUSCULAR; INTRAVENOUS at 20:04

## 2017-02-15 NOTE — ED TRIAGE NOTES
Pt went to PCP (Dr. Kalpana Belle) for abx because he thinks he has pna, states he has congestion, chest pain, nauseated and some diarrhea. Hx of CAD. States he thinks mostly cp from respiratory issue but has had pain around heart too. States he took a nitro on Monday and it relieved his cp.

## 2017-02-16 LAB
ATRIAL RATE: 159 BPM
CALCULATED P AXIS, ECG09: 103 DEGREES
CALCULATED R AXIS, ECG10: -73 DEGREES
CALCULATED T AXIS, ECG11: 62 DEGREES
DIAGNOSIS, 93000: NORMAL
DIASTOLIC BP, ECG02: NORMAL MMHG
P-R INTERVAL, ECG05: NORMAL MS
Q-T INTERVAL, ECG07: 316 MS
QRS DURATION, ECG06: 114 MS
QTC CALCULATION (BEZET), ECG08: 403 MS
SYSTOLIC BP, ECG01: NORMAL MMHG
VENTRICULAR RATE, ECG03: 98 BPM

## 2017-02-16 NOTE — DISCHARGE INSTRUCTIONS
Cough medication may make you sleepy. Continue to use her inhalers. Recheck with your doctor 2-3 days if not improving. Recheck sooner for worse pain/fever/vomiting/breathing         Bronchitis: Care Instructions  Your Care Instructions    Bronchitis is inflammation of the bronchial tubes, which carry air to the lungs. The tubes swell and produce mucus, or phlegm. The mucus and inflamed bronchial tubes make you cough. You may have trouble breathing. Most cases of bronchitis are caused by viruses like those that cause colds. Antibiotics usually do not help and they may be harmful. Bronchitis usually develops rapidly and lasts about 2 to 3 weeks in otherwise healthy people. Follow-up care is a key part of your treatment and safety. Be sure to make and go to all appointments, and call your doctor if you are having problems. It's also a good idea to know your test results and keep a list of the medicines you take. How can you care for yourself at home? · Take all medicines exactly as prescribed. Call your doctor if you think you are having a problem with your medicine. · Get some extra rest.  · Take an over-the-counter pain medicine, such as acetaminophen (Tylenol), ibuprofen (Advil, Motrin), or naproxen (Aleve) to reduce fever and relieve body aches. Read and follow all instructions on the label. · Do not take two or more pain medicines at the same time unless the doctor told you to. Many pain medicines have acetaminophen, which is Tylenol. Too much acetaminophen (Tylenol) can be harmful. · Take an over-the-counter cough medicine that contains dextromethorphan to help quiet a dry, hacking cough so that you can sleep. Avoid cough medicines that have more than one active ingredient. Read and follow all instructions on the label. · Breathe moist air from a humidifier, hot shower, or sink filled with hot water. The heat and moisture will thin mucus so you can cough it out. · Do not smoke.  Smoking can make bronchitis worse. If you need help quitting, talk to your doctor about stop-smoking programs and medicines. These can increase your chances of quitting for good. When should you call for help? Call 911 anytime you think you may need emergency care. For example, call if:  · You have severe trouble breathing. Call your doctor now or seek immediate medical care if:  · You have new or worse trouble breathing. · You cough up dark brown or bloody mucus (sputum). · You have a new or higher fever. · You have a new rash. Watch closely for changes in your health, and be sure to contact your doctor if:  · You cough more deeply or more often, especially if you notice more mucus or a change in the color of your mucus. · You are not getting better as expected. Where can you learn more? Go to http://liz-jovanna.info/. Enter H333 in the search box to learn more about \"Bronchitis: Care Instructions. \"  Current as of: May 23, 2016  Content Version: 11.1  © 2571-0031 [employer]. Care instructions adapted under license by [employer] (which disclaims liability or warranty for this information). If you have questions about a medical condition or this instruction, always ask your healthcare professional. [address] any warranty or liability for your use of this information.

## 2017-02-16 NOTE — ED NOTES
I have reviewed discharge instructions with the patient. The patient verbalized understanding. Pt left the ED ambulatory, with .

## 2017-02-16 NOTE — ED PROVIDER NOTES
HPI Comments: 67-year-old gentleman drove here from his family practice doctor's office. He presented to her office with cough and fever. Practitioner called me and said that he had some wheezing and a temperature 101.4 and had concerns for pneumonia. States she's been coughing for 2 days with white sputum. No vomiting or diarrhea. Soreness in the right chest wall and is worse with coughing. Patient is a 77 y.o. male presenting with shortness of breath. The history is provided by the patient. Shortness of Breath   This is a new problem. The current episode started 2 days ago. The problem has been gradually worsening. Associated symptoms include a fever, coryza, cough, sputum production (White), wheezing and chest pain. Pertinent negatives include no headaches, no neck pain, no hemoptysis, no syncope, no vomiting, no abdominal pain, no rash, no leg pain and no leg swelling. Associated medical issues include COPD, CAD and heart failure. Associated medical issues do not include PE, DVT or recent surgery. Past Medical History:   Diagnosis Date    Asthma     CAD (coronary artery disease)     COPD     Diabetes ([address]     Endocrine disease     Gastrointestinal disorder     Heart failure ([address]     Hypertension     Other ill-defined conditions(799.89)      elevated cholersterol    PUD (peptic ulcer disease)        Past Surgical History:   Procedure Laterality Date    Pr cardiac surg procedure unlist       bipass surgery         History reviewed. No pertinent family history. Social History     Social History    Marital status:      Spouse name: N/A    Number of children: N/A    Years of education: N/A     Occupational History    Not on file.      Social History Main Topics    Smoking status: Former Smoker    Smokeless tobacco: Not on file    Alcohol use No    Drug use: No    Sexual activity: Not on file     Other Topics Concern    Not on file     Social History Narrative ALLERGIES: Mylanta [aluminum-magnesium hydroxide]    Review of Systems   Constitutional: Positive for fever. Negative for chills. Respiratory: Positive for cough, sputum production (White), shortness of breath and wheezing. Negative for hemoptysis. Cardiovascular: Positive for chest pain. Negative for palpitations, leg swelling and syncope. Gastrointestinal: Negative for abdominal pain, diarrhea and vomiting. Genitourinary: Negative for dysuria and flank pain. Musculoskeletal: Negative for back pain and neck pain. Skin: Negative for color change and rash. Neurological: Negative for headaches. All other systems reviewed and are negative. Vitals:    02/15/17 1710 02/15/17 1922   BP: 126/75 151/81   Pulse: (!) 101 99   Resp: 18 20   Temp: 97.8 °F (36.6 °C)    SpO2: 96% 92%   Weight: 99.8 kg (220 lb)    Height: 6' 3\" (1.905 m)             Physical Exam   Constitutional: He is oriented to person, place, and time. He appears well-developed and well-nourished. No distress. HENT:   Head: Normocephalic and atraumatic. Mouth/Throat: Oropharynx is clear and moist. No oropharyngeal exudate. Eyes: Conjunctivae and EOM are normal. Pupils are equal, round, and reactive to light. Neck: Normal range of motion. Neck supple. Cardiovascular: Normal rate, regular rhythm and intact distal pulses. No murmur heard. Pulmonary/Chest: No respiratory distress. He has wheezes. Abdominal: Soft. Bowel sounds are normal. He exhibits no mass. There is no tenderness. There is no rebound and no guarding. No hernia. Neurological: He is alert and oriented to person, place, and time. Gait normal.   Nl speech   Skin: Skin is warm and dry. Psychiatric: He has a normal mood and affect. His speech is normal.   Nursing note and vitals reviewed. MDM  Number of Diagnoses or Management Options  Diagnosis management comments: Assessment pneumonia or congestive heart failure.   Check troponin and EKG because of chest pain but I believe this may more due to pleurisy. Assessment 4 urosepsis. Amount and/or Complexity of Data Reviewed  Clinical lab tests: ordered and reviewed  Tests in the radiology section of CPT®: ordered and reviewed  Tests in the medicine section of CPT®: ordered and reviewed  Independent visualization of images, tracings, or specimens: yes    Risk of Complications, Morbidity, and/or Mortality  Presenting problems: moderate  Diagnostic procedures: low  Management options: moderate    Patient Progress  Patient progress: stable    ED Course       Procedures      7:52 PM  Decreased wheezing. Sats still in the 91% range. No evidence for pneumonia or sepsis. Received IM antibiotics at the physician office. We'll place place and on antibiotics and cough medication. One dose of Decadron IV.

## 2017-07-01 PROBLEM — R29.6 FREQUENT FALLS: Status: ACTIVE | Noted: 2017-01-01

## 2017-07-01 PROBLEM — I50.40 COMBINED SYSTOLIC AND DIASTOLIC CONGESTIVE HEART FAILURE (HCC): Chronic | Status: ACTIVE | Noted: 2017-01-01

## 2017-07-01 PROBLEM — J18.9 CAP (COMMUNITY ACQUIRED PNEUMONIA): Status: ACTIVE | Noted: 2017-01-01

## 2017-07-01 PROBLEM — N17.9 ACUTE RENAL FAILURE (ARF) (HCC): Status: ACTIVE | Noted: 2017-01-01

## 2017-07-01 PROBLEM — I25.5 ISCHEMIC CARDIOMYOPATHY: Chronic | Status: ACTIVE | Noted: 2017-01-01

## 2017-07-01 PROBLEM — J44.9 COPD (CHRONIC OBSTRUCTIVE PULMONARY DISEASE) (HCC): Chronic | Status: ACTIVE | Noted: 2017-01-01

## 2017-07-01 PROBLEM — E11.9 DIABETES MELLITUS (HCC): Chronic | Status: ACTIVE | Noted: 2017-01-01

## 2017-07-01 NOTE — ED NOTES
TRANSFER - OUT REPORT:    Verbal report given to 8th floor on Lili   being transferred to [facility] for routine progression of care       Report consisted of patients Situation, Background, Assessment and   Recommendations(SBAR). Information from the following report(s) SBAR, ED Summary, [facility] and Recent Results was reviewed with the receiving nurse. Lines:   Peripheral IV 07/01/17 Right Hand (Active)   Site Assessment Clean, dry, & intact 7/1/2017  1:17 PM   Phlebitis Assessment 0 7/1/2017  1:17 PM   Infiltration Assessment 0 7/1/2017  1:17 PM        Opportunity for questions and clarification was provided.       Patient transported with:   O2 @ 2 liters  Tech

## 2017-07-01 NOTE — PROGRESS NOTES
Pt admitted to room 836 from ER  via stretcher and transport. Assessment completed upon patients arrival to unit and care assumed. Respiration even and unlabored 20/min at rest. VSS. Pt is alert and oriented, able to make needs known. Oriented to room and hospital protocols. Instructed to call for assistance with any needs. Verbalizes understanding. Dual full body skin assessment completed by Jannetta Koyanagi RN and this nurse. No breakdown to sacrum noted. Old surgical scars to chest noted. Sister at bedside. Pt's wallet given per pt to sister to take home. Denies needs at present. .  Bed locked and lowered, call light in reach. On fall precaution. Posey bed alarm on intact and patent. Denies needs at present. HOB up. Aware of need for sputum. speci-cup at bedside. Door open. Will continue to monitor.

## 2017-07-01 NOTE — H&P
HISTORY AND PHYSICAL      Salina Figueroa    7/1/2017    Date of Admission:  7/1/2017    The patient's chart is reviewed and the patient is discussed with the staff. Subjective:     Patient is a 77 y.o.  male presents with falls. Patient has a history of HTN, HL, CAD s/p CABG 1998, re-do CABG 2003, PCI 12/2014, DM, and COPD. He quit smoking 1998 with approximate 60 pack year history, is maintained on Singulair and albuterol, and is not followed by a pulmonologist.  He provides minimal history and friend at bedside assists. Apparently he was brought to the ER today because of multiple falls this am - his daughter was aware of 2-3 and he states that he fell 4-5 times that she did not witness. He is not able to describe what happens when he falls and thinks that he may loose consciousness, +/- palpitations. His friend states that he was febrile yesterday and was taken to Urgent Care where CXR was negative and he was started on Z-pack for bronchitis. Patient denies any recent increase in cough, mucus production,increase in swelling or chest pain. He does report occasional palpitations. Patient also reports that he is having frequent loose stools but no nausea/vomiting. His BP was initially low with SBP in the 90s. He received 250 ml fluid bolus with improvement of BP to most recent reading of 144/66. Currently on RA with o2 sat 90s.   WBC 9.3, PCT and BNP are pending      Review of Systems  Constitutional: negative, subjective temps yesterday  Eyes: positive for contacts/glasses  Ears, nose, mouth, throat, and face: negative  Respiratory: negative  Cardiovascular: positive for palpitations  Gastrointestinal: positive for diarrhea  Genitourinary:negative  Integument/breast: negative  Hematologic/lymphatic: negative  Musculoskeletal:negative  Neurological: negative  Behavioral/Psych: negative  Endocrine: negative  Allergic/Immunologic: negative    Patient Active Problem List Diagnosis Code    Unstable angina ([facility]) I20.0    HTN (hypertension) I10    Dyslipidemia E78.5    Diabetes mellitus ([facility] E11.9    COPD (chronic obstructive pulmonary disease) ([facility]) J44.9    CAD (coronary artery disease) I25.10    Acute renal failure (ARF) ([facility]) N17.9    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 Taking? ALPRAZolam (XANAX) 0.25 mg tablet   Yes No   Sig: Take 0.25 mg by mouth two (2) times daily as needed for Anxiety. HYDROcodone-homatropine (HYCODAN) 5-1.5 mg/5 mL syrup   No No   Sig: Take 5 mL by mouth four (4) times daily as needed. Max Daily Amount: 20 mL. albuterol (VENTOLIN HFA) 90 mcg/actuation inhaler   Yes No   Sig: Take  by inhalation as needed for Wheezing. aspirin 81 mg chewable tablet   Yes No   Sig: Take 1 tablet by mouth daily. atorvastatin (LIPITOR) 80 mg tablet   No No   Sig: Take 1 tablet by mouth nightly. azithromycin (ZITHROMAX Z-CARLOS MANUEL) 250 mg tablet   No No   Si pills day 1 then 1 pill daily for 4 days   carvedilol (COREG) 25 mg tablet   No No   Sig: Take 1 tablet by mouth two (2) times daily (with meals). ezetimibe (ZETIA) 10 mg tablet   Yes No   Sig: Take 10 mg by mouth daily. furosemide (LASIX) 40 mg tablet   Yes No   Sig: Take 40 mg by mouth two (2) times a day. 20 mg in am, 40 mg hs   glimepiride (AMARYL) 2 mg tablet   Yes No   Sig: Take 2 mg by mouth every morning. guaiFENesin ER (MUCINEX) 600 mg ER tablet   Yes No   Sig: Take 600 mg by mouth two (2) times a day. levothyroxine (SYNTHROID) 75 mcg tablet   Yes No   Sig: Take 75 mcg by mouth Daily (before breakfast). losartan (COZAAR) 50 mg tablet   No No   Sig: Take 1 tablet by mouth daily. montelukast (SINGULAIR) 10 mg tablet   Yes No   Sig: Take 10 mg by mouth daily.    nitroglycerin (NITROSTAT) 0.4 mg SL tablet   Yes No   Sig: by SubLINGual route every five (5) minutes as needed for Chest Pain.   potassium chloride SR (KLOR-CON 10) 10 mEq tablet   Yes No   Sig: Take 20 mEq by mouth two (2) times a day. ticagrelor (BRILINTA) 90 mg tablet   No No   Sig: Take 1 tablet by mouth every twelve (12) hours every twelve (12) hours. tiotropium (SPIRIVA WITH HANDIHALER) 18 mcg inhalation capsule   Yes No   Sig: Take 1 Cap by inhalation daily. zolpidem (AMBIEN) 10 mg tablet   Yes No   Sig: Take 10 mg by mouth nightly as needed. Facility-Administered Medications: None       Past Medical History:   Diagnosis Date    Asthma     CAD (coronary artery disease)     COPD     [facility]     Endocrine disease     Gastrointestinal disorder     Heart failure ([facility]     Hypertension     Other ill-defined conditions(799.89)     elevated cholersterol    PUD (peptic ulcer disease)      Past Surgical History:   Procedure Laterality Date    CARDIAC SURG PROCEDURE UNLIST      bipass surgery     Social History     Social History    Marital status:      Spouse name: N/A    Number of children: N/A    Years of education: N/A     Occupational History    Not on file. Social History Main Topics    Smoking status: Former Smoker    Smokeless tobacco: Not on file    Alcohol use No    Drug use: No    Sexual activity: Not on file     Other Topics Concern    Not on file     Social History Narrative     No family history on file. Allergies   Allergen Reactions    Mylanta [Aluminum-Magnesium Hydroxide] Rash       Current Facility-Administered Medications   Medication Dose Route Frequency    levoFLOXacin (LEVAQUIN) 750 mg in D5W IVPB  750 mg IntraVENous NOW     Current Outpatient Prescriptions   Medication Sig    azithromycin (ZITHROMAX Z-CARLOS MANUEL) 250 mg tablet 2 pills day 1 then 1 pill daily for 4 days    HYDROcodone-homatropine (HYCODAN) 5-1.5 mg/5 mL syrup Take 5 mL by mouth four (4) times daily as needed. Max Daily Amount: 20 mL.  carvedilol (COREG) 25 mg tablet Take 1 tablet by mouth two (2) times daily (with meals).  aspirin 81 mg chewable tablet Take 1 tablet by mouth daily.  atorvastatin (LIPITOR) 80 mg tablet Take 1 tablet by mouth nightly.  losartan (COZAAR) 50 mg tablet Take 1 tablet by mouth daily.  ticagrelor (BRILINTA) 90 mg tablet Take 1 tablet by mouth every twelve (12) hours every twelve (12) hours.  guaiFENesin ER (MUCINEX) 600 mg ER tablet Take 600 mg by mouth two (2) times a day.  montelukast (SINGULAIR) 10 mg tablet Take 10 mg by mouth daily.  nitroglycerin (NITROSTAT) 0.4 mg SL tablet by SubLINGual route every five (5) minutes as needed for Chest Pain.  albuterol (VENTOLIN HFA) 90 mcg/actuation inhaler Take  by inhalation as needed for Wheezing.  ALPRAZolam (XANAX) 0.25 mg tablet Take 0.25 mg by mouth two (2) times daily as needed for Anxiety.  ezetimibe (ZETIA) 10 mg tablet Take 10 mg by mouth daily.  furosemide (LASIX) 40 mg tablet Take 40 mg by mouth two (2) times a day. 20 mg in am, 40 mg hs    potassium chloride SR (KLOR-CON 10) 10 mEq tablet Take 20 mEq by mouth two (2) times a day.  glimepiride (AMARYL) 2 mg tablet Take 2 mg by mouth every morning.  tiotropium (SPIRIVA WITH HANDIHALER) 18 mcg inhalation capsule Take 1 Cap by inhalation daily.  zolpidem (AMBIEN) 10 mg tablet Take 10 mg by mouth nightly as needed.  levothyroxine (SYNTHROID) 75 mcg tablet Take 75 mcg by mouth Daily (before breakfast).          Objective:     Vitals:    07/01/17 1248 07/01/17 1300 07/01/17 1303 07/01/17 1401   BP: 117/68  123/58 146/66   Pulse: 72  74    Resp:       Temp:       SpO2:  91% 99% 92%   Weight:       Height:           PHYSICAL EXAM     Constitutional:  the patient is well developed and in no acute distress  EENMT:  Sclera clear, pupils equal, oral mucosa moist  Respiratory: crackles L posterior base, RA  Cardiovascular:  RRR without M,G,R  Gastrointestinal: soft and non-tender; with positive bowel sounds. Musculoskeletal: warm without cyanosis. There is 2+ lower leg edema. Skin:  no jaundice or rashes, no open wounds   Neurologic: no gross neuro deficits     Psychiatric:  alert and oriented x 3    CXR:  7/1            Recent Labs      07/01/17   1225   WBC  9.3   HGB  11.9*   HCT  34.9*   PLT  115*     Recent Labs      07/01/17   1225   NA  136   K  3.8   CL  100   GLU  185*   CO2  26   BUN  23   CREA  1.66*   CA  8.6   TROIQ  0.02   ALB  3.4   TBILI  1.5*   ALT  36   SGOT  42*     No results for input(s): PH, PCO2, PO2, HCO3 in the last 72 hours. No results for input(s): LCAD, LAC in the last 72 hours.     Assessment:  (Medical Decision Making)     Hospital Problems  Never Reviewed          Codes Class Noted POA    Diabetes mellitus ([facility] (Chronic) ICD-10-CM: E11.9  ICD-9-CM: 250.00  7/1/2017 Yes        COPD (chronic obstructive pulmonary disease) (HCC) (Chronic) ICD-10-CM: J44.9  ICD-9-CM: 496  7/1/2017 Yes    No active wheezing  Maintained on Singulair / Albuterol      Acute renal failure (ARF) (HCC) ICD-10-CM: N17.9  ICD-9-CM: 584.9  7/1/2017 Yes    Baseline creat 1.1 - elevated at 1.66 today      Combined systolic and diastolic congestive heart failure (HCC) (Chronic) ICD-10-CM: I50.40  ICD-9-CM: 428.40  7/1/2017 Yes    Currently followed by [facility]  Most recent TTE available in [address] [facility] from 12/2014 - EF at that time 30-35% with mildly reduced systolic and diastolic function with anterior/inferior wall hypokinesis      Frequent falls ICD-10-CM: R29.6  ICD-9-CM: V15.88  7/1/2017 Yes    Has fallen ~4-6 times today      Ischemic cardiomyopathy (Chronic) ICD-10-CM: I25.5  ICD-9-CM: 414.8  7/1/2017 Yes           Plan:  (Medical Decision Making)     --Will admit for further medical management  --Supplemental O2   --Continue home albuterol  --culture  --antibiotic therapy  -- engage PT, may need STR    More than 50% of the time documented was spent in face-to-face contact with the patient and in the care of the patient on the floor/unit where the patient is located. Zehra Rogers NP    The patient has been seen and examined by me personally, the chart,labs, and radiographic studies have been reviewed. Chest: Seems CTA at time of examination  Extremities: trace edema    I agree with the above assessment and plan. Engage PT and mobilize may need STR  Switch IV levaquin to po in 24-48h and complete 8 days of Rx  COPD is non exacerbated thus will hold off on inhaled and IV steroids.   If need be will escalate therapy accordingly    Izaiah House MD.

## 2017-07-01 NOTE — ED TRIAGE NOTES
Per ems called out for fall, sob dx with bronchitis yesterday, patient 90% room air, patient diarrhea since yesterday denies n/v. Patient systolic 90 initially 005PV normal saline 197 systolic      bgl 746

## 2017-07-01 NOTE — IP AVS SNAPSHOT
Current Discharge Medication List  
  
START taking these medications Dose & Instructions Dispensing Information Comments Morning Noon Evening Bedtime  
 cefdinir 300 mg capsule Commonly known as:  OMNICEF Your next dose is:  07/06/17 pm  
   
 Dose:  300 mg Take 1 Cap by mouth two (2) times a day. Quantity:  2 Cap Refills:  0  
     
  
   
   
   
  
  
 potassium chloride 20 mEq tablet Commonly known as:  K-DUR, KLOR-CON Replaces:  potassium chloride SR 10 mEq tablet Your next dose is:  07/07/17 Dose:  20 mEq Take 1 Tab by mouth daily. Quantity:  1 Tab Refills:  0 CONTINUE these medications which have CHANGED Dose & Instructions Dispensing Information Comments Morning Noon Evening Bedtime  
 zolpidem 10 mg tablet Commonly known as:  AMBIEN What changed:  reasons to take this Your next dose is: At bedtime as needed Dose:  10 mg Take 1 Tab by mouth nightly as needed for Sleep. Max Daily Amount: 10 mg.  
 Quantity:  1 Tab Refills:  0 CONTINUE these medications which have NOT CHANGED Dose & Instructions Dispensing Information Comments Morning Noon Evening Bedtime  
 aspirin 81 mg chewable tablet Your next dose is:  07/07/17 Dose:  81 mg Take 1 tablet by mouth daily. Refills:  0  
     
  
   
   
   
  
 atorvastatin 80 mg tablet Commonly known as:  LIPITOR Your next dose is:  07/06/17 9pm  
   
 Dose:  80 mg Take 1 Tab by mouth nightly. Quantity:  1 Tab Refills:  0  
     
   
   
   
  
  
 carvedilol 25 mg tablet Commonly known as:  Nayeli Awe Your next dose is:  07/06/17 5pm  
   
 Dose:  25 mg Take 1 tablet by mouth two (2) times daily (with meals). Quantity:  60 tablet Refills:  6  
     
  
   
   
  
   
  
 ezetimibe 10 mg tablet Commonly known as:  Luis Baez Your next dose is:  07/07/17  Dose:  10 mg  
 Take 1 Tab by mouth daily. Quantity:  1 Tab Refills:  0  
     
  
   
   
   
  
 furosemide 40 mg tablet Commonly known as:  LASIX Your next dose is:  07/06/17 4pm  
   
 Dose:  40 mg Take 40 mg by mouth two (2) times a day. 20 mg in am, 40 mg hs  Indications: takes 1/2 pill on saturday and sunday, then 40 mg rest of the week. Refills:  0  
     
  
   
   
  
   
  
 glimepiride 2 mg tablet Commonly known as:  AMARYL Your next dose is:  07/07/17 Dose:  2 mg Take 2 mg by mouth every morning. Refills:  0  
     
  
   
   
   
  
 levothyroxine 75 mcg tablet Commonly known as:  SYNTHROID Your next dose is:  07/07/17 Dose:  75 mcg Take 75 mcg by mouth Daily (before breakfast). Refills:  0  
     
  
   
   
   
  
 losartan 50 mg tablet Commonly known as:  COZAAR Your next dose is:  07/07/17 Dose:  50 mg Take 1 tablet by mouth daily. Quantity:  30 tablet Refills:  6 MUCINEX 600 mg ER tablet Generic drug:  guaiFENesin ER Your next dose is:  07/06/17 9pm  
   
 Dose:  600 mg Take 600 mg by mouth two (2) times a day. Refills:  0  
     
  
   
   
   
  
  
 nitroglycerin 0.4 mg SL tablet Commonly known as:  NITROSTAT  
   
 by SubLINGual route every five (5) minutes as needed for Chest Pain. Refills:  0 PLAVIX 75 mg Tab Generic drug:  clopidogrel Your next dose is:  07/07/17 Dose:  75 mg Take 75 mg by mouth daily. Refills:  0  
     
  
   
   
   
  
 rosuvastatin 20 mg tablet Commonly known as:  CRESTOR Your next dose is:  07/06/17 9pm  
   
 Dose:  20 mg Take 20 mg by mouth nightly. Refills:  0 SINGULAIR 10 mg tablet Generic drug:  montelukast  
Your next dose is:  07/07/17 Dose:  10 mg Take 10 mg by mouth daily. Refills:  0 SPIRIVA WITH HANDIHALER 18 mcg inhalation capsule Generic drug:  tiotropium Your next dose is:  07/07/17 Dose:  1 Cap Take 1 Cap by inhalation daily. Refills:  0 VENTOLIN HFA 90 mcg/actuation inhaler Generic drug:  albuterol Your next dose is:  As needed Take  by inhalation as needed for Wheezing. Refills:  0  
     
   
   
   
  
 XANAX 0.25 mg tablet Generic drug:  ALPRAZolam  
Your next dose is:  As needed Dose:  0.25 mg Take 0.25 mg by mouth two (2) times daily as needed for Anxiety. Refills:  0 STOP taking these medications   
 amoxicillin-clavulanate 875-125 mg per tablet Commonly known as:  AUGMENTIN  
   
  
 azithromycin 250 mg tablet Commonly known as:  ZITHROMAX Z-CARLOS MANUEL  
   
  
 potassium chloride SR 10 mEq tablet Commonly known as:  KLOR-CON 10 Replaced by:  potassium chloride 20 mEq tablet  
   
  
 ticagrelor 90 mg tablet Commonly known as:  [employer] Where to Get Your Medications Information on where to get these meds will be given to you by the nurse or doctor. ! Ask your nurse or doctor about these medications  
  atorvastatin 80 mg tablet  
 cefdinir 300 mg capsule  
 ezetimibe 10 mg tablet  
 potassium chloride 20 mEq tablet  
 zolpidem 10 mg tablet

## 2017-07-01 NOTE — PROGRESS NOTES
TRANSFER - IN REPORT:    Verbal report received from Joanie Borrero RN (name) on Ricardo Media  being received from ER (unit) for routine progression of care      Report consisted of patients Situation, Background, Assessment and   Recommendations(SBAR). Information from the following report(s) SBAR and Kardex was reviewed with the receiving nurse. Opportunity for questions and clarification was provided. Assessment completed upon patients arrival to unit and care assumed. SBAR given to receiving RN, Gerald Hernández.

## 2017-07-01 NOTE — ED PROVIDER NOTES
HPI Comments: patient    Patient is a 77 y.o. male presenting with shortness of breath and syncope. The history is provided by the patient. Shortness of Breath   This is a new problem. The average episode lasts 3 days. The problem occurs continuously. The problem has been gradually worsening. Associated symptoms include a fever, rhinorrhea, cough, sputum production and syncope. Pertinent negatives include no headaches, no sore throat, no chest pain, no vomiting, no abdominal pain, no leg pain and no leg swelling. He has tried nothing for the symptoms. He has had no prior hospitalizations. Prior ED visits: urgent care yesterday with negative x-ray and started on Z-Rocco. Associated medical issues include COPD. Syncope    This is a new problem. The current episode started yesterday. The problem occurs daily. The problem has not changed since onset. There was no loss of consciousness. The problem is associated with standing up. Associated symptoms include a fever. Pertinent negatives include no chest pain, no abdominal pain, no nausea, no vomiting, no headaches, no back pain and no weakness. Treatments tried: antibiotics for bronchitis. The treatment provided no relief. Past Medical History:   Diagnosis Date    Asthma     CAD (coronary artery disease)     COPD     Diabetes ([facility] [address]     Endocrine disease     Gastrointestinal disorder     Heart failure ([facility] [address]     Hypertension     Other ill-defined conditions(799.89)     elevated cholersterol    PUD (peptic ulcer disease)        Past Surgical History:   Procedure Laterality Date    CARDIAC SURG PROCEDURE UNLIST      bipass surgery         No family history on file. Social History     Social History    Marital status:      Spouse name: N/A    Number of children: N/A    Years of education: N/A     Occupational History    Not on file.      Social History Main Topics    Smoking status: Former Smoker    Smokeless tobacco: Not on file    Alcohol use No    Drug use: No    Sexual activity: Not on file     Other Topics Concern    Not on file     Social History Narrative         ALLERGIES: Mylanta [aluminum-magnesium hydroxide]    Review of Systems   Constitutional: Positive for fever. Negative for chills. HENT: Positive for rhinorrhea. Negative for sore throat. Eyes: Negative for photophobia and redness. Respiratory: Positive for cough, sputum production and shortness of breath. Cardiovascular: Positive for syncope. Negative for chest pain and leg swelling. Gastrointestinal: Positive for diarrhea (few episodes of loose stool). Negative for abdominal pain, nausea and vomiting. Endocrine: Negative for polydipsia and polyuria. Genitourinary: Negative for dysuria. Musculoskeletal: Negative for back pain and myalgias. Neurological: Negative for weakness, numbness and headaches. Vitals:    07/01/17 1209 07/01/17 1248 07/01/17 1300   BP: 119/56 117/68    Pulse: 77 72    Resp: 18     Temp: 98 °F (36.7 °C)     SpO2: 92%  91%   Weight: 99.8 kg (220 lb)     Height: 6' 3\" (1.905 m)              Physical Exam   Constitutional: He is oriented to person, place, and time. He appears well-developed and well-nourished. HENT:   Mouth/Throat: Oropharynx is clear and moist. No oropharyngeal exudate. Eyes: Conjunctivae are normal. Pupils are equal, round, and reactive to light. Neck: Neck supple. Cardiovascular: Normal rate, regular rhythm and normal heart sounds. Pulmonary/Chest: Effort normal and breath sounds normal.   Abdominal: Soft. Bowel sounds are normal. He exhibits no distension. There is no tenderness. There is no rebound and no guarding. Musculoskeletal: Normal range of motion. He exhibits no edema or tenderness. Lymphadenopathy:     He has no cervical adenopathy. Neurological: He is alert and oriented to person, place, and time. No cranial nerve deficit or sensory deficit. He exhibits normal muscle tone.  Coordination normal.   Skin: Skin is warm and dry. Nursing note and vitals reviewed. MDM  Number of Diagnoses or Management Options  Diagnosis management comments: Patient is orthostatic. Chest x-ray reveals right sided infiltrate. Oxygen saturations are 91 and 92% on room air while at rest.  Patient is cultured and lactic acid was found to be normal.  No signs of sepsis. IV Levaquin ordered. IV hydration for orthostatics ordered. I have asked the pulmonary service to admit for observation for community acquired pneumonia and orthostatic hypotension.        Amount and/or Complexity of Data Reviewed  Clinical lab tests: ordered and reviewed (Results for orders placed or performed during the hospital encounter of 07/01/17  -CBC WITH AUTOMATED DIFF       Result                                            Value                         Ref Range                       WBC                                               9.3                           4.3 - 11.1 K/uL                 RBC                                               4.77                          4.23 - 5.67 M/uL                HGB                                               11.9 (L)                      13.6 - 17.2 g/dL                HCT                                               34.9 (L)                      41.1 - 50.3 %                   MCV                                               73.2 (L)                      79.6 - 97.8 FL                  MCH                                               24.9 (L)                      26.1 - 32.9 PG                  MCHC                                              34.1                          31.4 - 35.0 g/dL                RDW                                               14.3                          11.9 - 14.6 %                   PLATELET                                          115 (L)                       150 - 450 K/uL                  MPV                                               Cannot be calulated           10.8 - 14.1 FL                  DF                                                AUTOMATED                                                     NEUTROPHILS                                       81 (H)                        43 - 78 %                       LYMPHOCYTES                                       9 (L)                         13 - 44 %                       MONOCYTES                                         10                            4.0 - 12.0 %                    EOSINOPHILS                                       0 (L)                         0.5 - 7.8 %                     BASOPHILS                                         0                             0.0 - 2.0 %                     IMMATURE GRANULOCYTES                             0.2                           0.0 - 5.0 %                     ABS. NEUTROPHILS                                  7.5                           1.7 - 8.2 K/UL                  ABS. LYMPHOCYTES                                  0.8                           0.5 - 4.6 K/UL                  ABS. MONOCYTES                                    1.0                           0.1 - 1.3 K/UL                  ABS. EOSINOPHILS                                  0.0                           0.0 - 0.8 K/UL                  ABS. BASOPHILS                                    0.0                           0.0 - 0.2 K/UL                  ABS. IMM.  GRANS.                                  0.0                           0.0 - 0.5 K/UL             -METABOLIC PANEL, COMPREHENSIVE       Result                                            Value                         Ref Range                       Sodium                                            136                           136 - 145 mmol/L                Potassium                                         3.8                           3.5 - 5.1 mmol/L                Chloride                                          100 98 - 107 mmol/L                 CO2                                               26                            21 - 32 mmol/L                  Anion gap                                         10                            7 - 16 mmol/L                   Glucose                                           185 (H)                       65 - 100 mg/dL                  BUN                                               23                            8 - 23 MG/DL                    Creatinine                                        1.66 (H)                      0.8 - 1.5 MG/DL                 GFR est AA                                        52 (L)                        >60 ml/min/1.73m2               GFR est non-AA                                    43 (L)                        >60 ml/min/1.73m2               Calcium                                           8.6                           8.3 - 10.4 MG/DL                Bilirubin, total                                  1.5 (H)                       0.2 - 1.1 MG/DL                 ALT (SGPT)                                        36                            12 - 65 U/L                     AST (SGOT)                                        42 (H)                        15 - 37 U/L                     Alk. phosphatase                                  51                            50 - 136 U/L                    Protein, total                                    7.5                           6.3 - 8.2 g/dL                  Albumin                                           3.4                           3.2 - 4.6 g/dL                  Globulin                                          4.1 (H)                       2.3 - 3.5 g/dL                  A-G Ratio                                         0.8 (L)                       1.2 - 3.5                  -TROPONIN I       Result                                            Value                         Ref Range Troponin-I, Qt.                                   0.02                          0.02 - 0.05 NG/ML          -POC LACTIC ACID       Result                                            Value                         Ref Range                       Lactic Acid (POC)                                 1.3                           0.5 - 1.9 mmol/L           -EKG, 12 LEAD, INITIAL       Result                                            Value                         Ref Range                       Ventricular Rate                                  77                            BPM                             Atrial Rate                                       77                            BPM                             P-R Interval                                      208                           ms                              QRS Duration                                      120                           ms                              Q-T Interval                                      382                           ms                              QTC Calculation (Bezet)                           432                           ms                              Calculated P Axis                                 22                            degrees                         Calculated R Axis                                 -53                           degrees                         Calculated T Axis                                 -30                           degrees                         Diagnosis                                                                                                   !! AGE AND GENDER SPECIFIC ECG ANALYSIS !!    Normal sinus rhythm   Left anterior fascicular block   T wave abnormality, consider anterior ischemia   Abnormal ECG   When compared with ECG of 15-FEB-2017 17:31,   Nonspecific T wave abnormality now evident in Inferior leads   T wave inversion more evident in Anterior leads     )  Tests in the radiology section of CPT®: ordered and reviewed (Xr Chest Pa Lat    Result Date: 7/1/2017  PA and lateral chest radiographs History: sob, 76 years Male sob Cough dx with bronchitis yesterday, ?patient 90% room air,diarrhea since yesterday Comparison: Chest radiograph February 15, 2017 Findings:  Normal cardiomediastinal silhouette with evidence of CABG. There are new acute left basilar and right midlung airspace opacities, most likely representing acute multifocal pneumonia. Persistent trace left pleural effusion. No evidence of pneumothorax. Visualized soft tissue and osseous structures otherwise unremarkable.       Impression:  Probable acute multifocal pneumonia.      )      ED Course       Procedures

## 2017-07-01 NOTE — IP AVS SNAPSHOT
Karon Michele 
 
 
 [address] [facility] [address] [facility] 
[phone] Patient: Arnulfo Masterson MRN: [id number] You are allergic to the following Allergen Reactions Mylanta (Aluminum-Magnesium Hydroxide) Rash Recent Documentation Height Weight BMI Smoking Status 1.905 m 90.4 kg 24.92 kg/m2 Former Smoker Emergency Contacts Name Discharge Info Relation Home Work Mobile Rick Alvarado  Child [2] [phone] About your hospitalization You were admitted on:  July 1, 2017 You last received care in the:  [facility] 8 MED SURG You were discharged on:  July 6, 2017 Unit phone number:  [phone] Why you were hospitalized Your primary diagnosis was:  Cap (Community Acquired Pneumonia) Your diagnoses also included:  Acute Renal Failure (Arf) (Hcc), Copd (Chronic Obstructive Pulmonary Disease) (Hcc), Diabetes Mellitus (Hcc), Combined Systolic And Diastolic Congestive Heart Failure (Hcc), Frequent Falls, Ischemic Cardiomyopathy Providers Seen During Your Hospitalizations Provider Role Specialty Primary office phone Landy Esquivel MD Attending Provider Emergency Medicine [phone] Felicia Dunham MD Attending Provider Pulmonary Disease [phone] Your Primary Care Physician (PCP) Primary Care Physician Office Phone Office Fax Myra Abdelrahman [phone] Follow-up Information Follow up With Details Comments Contact Info [address] Sarah Shayan Marks [address] [phone] Ruth Nieves MD On 7/17/2017 11:00 am [address] [place] [address] [address] [place] [id number] [phone] Current Discharge Medication List  
  
START taking these medications Dose & Instructions Dispensing Information Comments Morning Noon Evening Bedtime  
 cefdinir 300 mg capsule Commonly known as:  OMNICEF Your next dose is:  07/06/17 pm  
   
 Dose:  300 mg Take 1 Cap by mouth two (2) times a day. Quantity:  2 Cap Refills:  0  
     
  
   
   
   
  
  
 potassium chloride 20 mEq tablet Commonly known as:  K-DUR, KLOR-CON Replaces:  potassium chloride SR 10 mEq tablet Your next dose is:  07/07/17 Dose:  20 mEq Take 1 Tab by mouth daily. Quantity:  1 Tab Refills:  0 CONTINUE these medications which have CHANGED Dose & Instructions Dispensing Information Comments Morning Noon Evening Bedtime  
 zolpidem 10 mg tablet Commonly known as:  AMBIEN What changed:  reasons to take this Your next dose is: At bedtime as needed Dose:  10 mg Take 1 Tab by mouth nightly as needed for Sleep. Max Daily Amount: 10 mg.  
 Quantity:  1 Tab Refills:  0 CONTINUE these medications which have NOT CHANGED Dose & Instructions Dispensing Information Comments Morning Noon Evening Bedtime  
 aspirin 81 mg chewable tablet Your next dose is:  07/07/17 Dose:  81 mg Take 1 tablet by mouth daily. Refills:  0  
     
  
   
   
   
  
 atorvastatin 80 mg tablet Commonly known as:  LIPITOR Your next dose is:  07/06/17 9pm  
   
 Dose:  80 mg Take 1 Tab by mouth nightly. Quantity:  1 Tab Refills:  0  
     
   
   
   
  
  
 carvedilol 25 mg tablet Commonly known as:  [place] Your next dose is:  07/06/17 5pm  
   
 Dose:  25 mg Take 1 tablet by mouth two (2) times daily (with meals). Quantity:  60 tablet Refills:  6  
     
  
   
   
  
   
  
 ezetimibe 10 mg tablet Commonly known as:  Arleta Me Your next dose is:  07/07/17 Dose:  10 mg Take 1 Tab by mouth daily. Quantity:  1 Tab Refills:  0  
     
  
   
   
   
  
 furosemide 40 mg tablet Commonly known as:  LASIX Your next dose is:  07/06/17 4pm  
   
 Dose:  40 mg Take 40 mg by mouth two (2) times a day. 20 mg in am, 40 mg hs  Indications: takes 1/2 pill on saturday and sunday, then 40 mg rest of the week. Refills:  0  
     
  
   
   
  
   
  
 glimepiride 2 mg tablet Commonly known as:  AMARYL Your next dose is:  07/07/17 Dose:  2 mg Take 2 mg by mouth every morning. Refills:  0  
     
  
   
   
   
  
 levothyroxine 75 mcg tablet Commonly known as:  SYNTHROID Your next dose is:  07/07/17 Dose:  75 mcg Take 75 mcg by mouth Daily (before breakfast). Refills:  0  
     
  
   
   
   
  
 losartan 50 mg tablet Commonly known as:  COZAAR Your next dose is:  07/07/17 Dose:  50 mg Take 1 tablet by mouth daily. Quantity:  30 tablet Refills:  6 MUCINEX 600 mg ER tablet Generic drug:  guaiFENesin ER Your next dose is:  07/06/17 9pm  
   
 Dose:  600 mg Take 600 mg by mouth two (2) times a day. Refills:  0  
     
  
   
   
   
  
  
 nitroglycerin 0.4 mg SL tablet Commonly known as:  NITROSTAT  
   
 by SubLINGual route every five (5) minutes as needed for Chest Pain. Refills:  0 PLAVIX 75 mg Tab Generic drug:  clopidogrel Your next dose is:  07/07/17 Dose:  75 mg Take 75 mg by mouth daily. Refills:  0  
     
  
   
   
   
  
 rosuvastatin 20 mg tablet Commonly known as:  CRESTOR Your next dose is:  07/06/17 9pm  
   
 Dose:  20 mg Take 20 mg by mouth nightly. Refills:  0 SINGULAIR 10 mg tablet Generic drug:  montelukast  
Your next dose is:  07/07/17 Dose:  10 mg Take 10 mg by mouth daily. Refills:  0 SPIRIVA WITH HANDIHALER 18 mcg inhalation capsule Generic drug:  tiotropium Your next dose is:  07/07/17 Dose:  1 Cap Take 1 Cap by inhalation daily. Refills:  0 VENTOLIN HFA 90 mcg/actuation inhaler Generic drug:  albuterol Your next dose is:  As needed Take  by inhalation as needed for Wheezing. Refills:  0  
     
   
   
   
  
 XANAX 0.25 mg tablet Generic drug:  ALPRAZolam  
Your next dose is:  As needed Dose:  0.25 mg Take 0.25 mg by mouth two (2) times daily as needed for Anxiety. Refills:  0 STOP taking these medications   
 amoxicillin-clavulanate 875-125 mg per tablet Commonly known as:  AUGMENTIN  
   
  
 azithromycin 250 mg tablet Commonly known as:  ZITHROMAX Z-CARLOS MANUEL  
   
  
 potassium chloride SR 10 mEq tablet Commonly known as:  KLOR-CON 10 Replaced by:  potassium chloride 20 mEq tablet  
   
  
 ticagrelor 90 mg tablet Commonly known as:  [employer] Where to Get Your Medications Information on where to get these meds will be given to you by the nurse or doctor. ! Ask your nurse or doctor about these medications  
  atorvastatin 80 mg tablet  
 cefdinir 300 mg capsule  
 ezetimibe 10 mg tablet  
 potassium chloride 20 mEq tablet  
 zolpidem 10 mg tablet Discharge Instructions DISCHARGE SUMMARY from Nurse The following personal items are in your possession at time of discharge: 
 
Dental Appliances: None Visual Aid: Glasses Home Medications: None Jewelry: None Clothing: None Other Valuables: None Personal Items Sent to Safe: none PATIENT INSTRUCTIONS: 
 
After general anesthesia or intravenous sedation, for 24 hours or while taking prescription Narcotics: · Limit your activities · Do not drive and operate hazardous machinery · Do not make important personal or business decisions · Do  not drink alcoholic beverages · If you have not urinated within 8 hours after discharge, please contact your surgeon on call. Report the following to your surgeon: 
· Excessive pain, swelling, redness or odor of or around the surgical area · Temperature over 100.5 · Nausea and vomiting lasting longer than 4 hours or if unable to take medications · Any signs of decreased circulation or nerve impairment to extremity: change in color, persistent  numbness, tingling, coldness or increase pain · Any questions What to do at Home: 
Recommended activity: Activity as tolerated. If you experience any of the following symptoms temp > 101.54, worsening cough or wheezing, shortness of breath or fatigue not relieved with rest, please follow up with MD. 
 
 
*  Please give a list of your current medications to your Primary Care Provider. *  Please update this list whenever your medications are discontinued, doses are 
    changed, or new medications (including over-the-counter products) are added. *  Please carry medication information at all times in case of emergency situations. These are general instructions for a healthy lifestyle: No smoking/ No tobacco products/ Avoid exposure to second hand smoke Surgeon General's Warning:  Quitting smoking now greatly reduces serious risk to your health. Obesity, smoking, and sedentary lifestyle greatly increases your risk for illness A healthy diet, regular physical exercise & weight monitoring are important for maintaining a healthy lifestyle You may be retaining fluid if you have a history of heart failure or if you experience any of the following symptoms:  Weight gain of 3 pounds or more overnight or 5 pounds in a week, increased swelling in our hands or feet or shortness of breath while lying flat in bed. Please call your doctor as soon as you notice any of these symptoms; do not wait until your next office visit. Recognize signs and symptoms of STROKE: 
 
F-face looks uneven A-arms unable to move or move unevenly S-speech slurred or non-existent T-time-call 911 as soon as signs and symptoms begin-DO NOT go  
 Back to bed or wait to see if you get better-TIME IS BRAIN. Warning Signs of HEART ATTACK Call 911 if you have these symptoms: 
? Chest discomfort. Most heart attacks involve discomfort in the center of the chest that lasts more than a few minutes, or that goes away and comes back. It can feel like uncomfortable pressure, squeezing, fullness, or pain. ? Discomfort in other areas of the upper body. Symptoms can include pain or discomfort in one or both arms, the back, neck, jaw, or stomach. ? Shortness of breath with or without chest discomfort. ? Other signs may include breaking out in a cold sweat, nausea, or lightheadedness. Don't wait more than five minutes to call [address]! Fast action can save your life. Calling 911 is almost always the fastest way to get lifesaving treatment. Emergency Medical Services staff can begin treatment when they arrive  up to an hour sooner than if someone gets to the hospital by car. The discharge information has been reviewed with the patient. The patient verbalized understanding. Discharge medications reviewed with the patient and appropriate educational materials and side effects teaching were provided. Chronic Obstructive Pulmonary Disease (COPD): Care Instructions Your Care Instructions Chronic obstructive pulmonary disease (COPD) is a general term for a group of lung diseases, including emphysema and chronic bronchitis. People with COPD have decreased airflow in and out of the lungs, which makes it hard to breathe. The airways also can get clogged with thick mucus. Cigarette smoking is a major cause of COPD. Although there is no cure for COPD, you can slow its progress. Following your treatment plan and taking care of yourself can help you feel better and live longer. Follow-up care is a key part of your treatment and safety.  Be sure to make and go to all appointments, and call your doctor if you are having problems. It's also a good idea to know your test results and keep a list of the medicines you take. How can you care for yourself at home? Staying healthy · Do not smoke. This is the most important step you can take to prevent more damage to your lungs. If you need help quitting, talk to your doctor about stop-smoking programs and medicines. These can increase your chances of quitting for good. · Avoid colds and flu. Get a pneumococcal vaccine shot. If you have had one before, ask your doctor whether you need a second dose. Get the flu vaccine every fall. If you must be around people with colds or the flu, wash your hands often. · Avoid secondhand smoke, air pollution, and high altitudes. Also avoid cold, dry air and hot, humid air. Stay at home with your windows closed when air pollution is bad. Medicines and oxygen therapy · Take your medicines exactly as prescribed. Call your doctor if you think you are having a problem with your medicine. · You may be taking medicines such as: ¨ Bronchodilators. These help open your airways and make breathing easier. Bronchodilators are either short-acting (work for 6 to 9 hours) or long-acting (work for 24 hours). You inhale most bronchodilators, so they start to act quickly. Always carry your quick-relief inhaler with you in case you need it while you are away from home. ¨ Corticosteroids (prednisone, budesonide). These reduce airway inflammation. They come in pill or inhaled form. You must take these medicines every day for them to work well. · A spacer may help you get more inhaled medicine to your lungs. Ask your doctor or pharmacist if a spacer is right for you. If it is, ask how to use it properly. · Do not take any vitamins, over-the-counter medicine, or herbal products without talking to your doctor first. 
· If your doctor prescribed antibiotics, take them as directed. Do not stop taking them just because you feel better.  You need to take the full course of antibiotics. · Oxygen therapy boosts the amount of oxygen in your blood and helps you breathe easier. Use the flow rate your doctor has recommended, and do not change it without talking to your doctor first. 
Activity · Get regular exercise. Walking is an easy way to get exercise. Start out slowly, and walk a little more each day. · Pay attention to your breathing. You are exercising too hard if you cannot talk while you are exercising. · Take short rest breaks when doing household chores and other activities. · Learn breathing methodssuch as breathing through pursed lipsto help you become less short of breath. · If your doctor has not set you up with a pulmonary rehabilitation program, talk to him or her about whether rehab is right for you. Rehab includes exercise programs, education about your disease and how to manage it, help with diet and other changes, and emotional support. Diet · Eat regular, healthy meals. Use bronchodilators about 1 hour before you eat to make it easier to eat. Eat several small meals instead of three large ones. Drink beverages at the end of the meal. Avoid foods that are hard to chew. · Eat foods that contain protein so that you do not lose muscle mass. · Talk with your doctor if you gain too much weight or if you lose weight without trying. Mental health · Talk to your family, friends, or a therapist about your feelings. It is normal to feel frightened, angry, hopeless, helpless, and even guilty. Talking openly about bad feelings can help you cope. If these feelings last, talk to your doctor. When should you call for help? Call 911 anytime you think you may need emergency care. For example, call if: 
· You have severe trouble breathing. Call your doctor now or seek immediate medical care if: 
· You have new or worse trouble breathing. · You cough up blood. · You have a fever. Watch closely for changes in your health, and be sure to contact your doctor if: · You cough more deeply or more often, especially if you notice more mucus or a change in the color of your mucus. · You have new or worse swelling in your legs or belly. · You are not getting better as expected. Where can you learn more? Go to http://liz-jovanna.info/. Harshil Bussing in the search box to learn more about \"Chronic Obstructive Pulmonary Disease (COPD): Care Instructions. \" Current as of: March 25, 2017 Content Version: 11.3 © 6298-3207 [employer]. Care instructions adapted under license by [employer] (which disclaims liability or warranty for this information). If you have questions about a medical condition or this instruction, always ask your healthcare professional. [address] any warranty or liability for your use of this information. Pneumonia: Care Instructions Your Care Instructions Pneumonia is an infection of the lungs. Most cases are caused by infections from bacteria or viruses. Pneumonia may be mild or very severe. If it is caused by bacteria, you will be treated with antibiotics. It may take a few weeks to a few months to recover fully from pneumonia, depending on how sick you were and whether your overall health is good. Follow-up care is a key part of your treatment and safety. Be sure to make and go to all appointments, and call your doctor if you are having problems. Its also a good idea to know your test results and keep a list of the medicines you take. How can you care for yourself at home? · Take your antibiotics exactly as directed. Do not stop taking the medicine just because you are feeling better. You need to take the full course of antibiotics. · Take your medicines exactly as prescribed. Call your doctor if you think you are having a problem with your medicine. · Get plenty of rest and sleep. You may feel weak and tired for a while, but your energy level will improve with time. · To prevent dehydration, drink plenty of fluids, enough so that your urine is light yellow or clear like water. Choose water and other caffeine-free clear liquids until you feel better. If you have kidney, heart, or liver disease and have to limit fluids, talk with your doctor before you increase the amount of fluids you drink. · Take care of your cough so you can rest. A cough that brings up mucus from your lungs is common with pneumonia. It is one way your body gets rid of the infection. But if coughing keeps you from resting or causes severe fatigue and chest-wall pain, talk to your doctor. He or she may suggest that you take a medicine to reduce the cough. · Use a vaporizer or humidifier to add moisture to your bedroom. Follow the directions for cleaning the machine. · Do not smoke or allow others to smoke around you. Smoke will make your cough last longer. If you need help quitting, talk to your doctor about stop-smoking programs and medicines. These can increase your chances of quitting for good. · Take an over-the-counter pain medicine, such as acetaminophen (Tylenol), ibuprofen (Advil, Motrin), or naproxen (Aleve). Read and follow all instructions on the label. · Do not take two or more pain medicines at the same time unless the doctor told you to. Many pain medicines have acetaminophen, which is Tylenol. Too much acetaminophen (Tylenol) can be harmful. · If you were given a spirometer to measure how well your lungs are working, use it as instructed. This can help your doctor tell how your recovery is going. · To prevent pneumonia in the future, talk to your doctor about getting a flu vaccine (once a year) and a pneumococcal vaccine (one time only for most people). When should you call for help? Call 911 anytime you think you may need emergency care. For example, call if: 
· You have severe trouble breathing. Call your doctor now or seek immediate medical care if: · You cough up dark brown or bloody mucus (sputum). · You have new or worse trouble breathing. · You are dizzy or lightheaded, or you feel like you may faint. Watch closely for changes in your health, and be sure to contact your doctor if: 
· You have a new or higher fever. · You are coughing more deeply or more often. · You are not getting better after 2 days (48 hours). · You do not get better as expected. Where can you learn more? Go to http://liz-jovanna.info/. Enter [id number] in the search box to learn more about \"Pneumonia: Care Instructions. \" Current as of: March 25, 2017 Content Version: 11.3 © 5407-1504 [employer]. Care instructions adapted under license by [employer] (which disclaims liability or warranty for this information). If you have questions about a medical condition or this instruction, always ask your healthcare professional. [place] [address] any warranty or liability for your use of this information. Discharge Orders None [employer] Announcement We are excited to announce that we are making your provider's discharge notes available to you in [employer]. You will see these notes when they are completed and signed by the physician that discharged you from your recent hospital stay. If you have any questions or concerns about any information you see in [employer], please call the Health Information Department where you were seen or reach out to your Primary Care Provider for more information about your plan of care. Introducing [facility]! Sheila Zamudio introduces [employer] patient portal. Now you can access parts of your medical record, email your doctor's office, and request medication refills online. 1. In your internet browser, go to [email or web address] 2. Click on the First Time User? Click Here link in the Sign In box. You will see the New Member Sign Up page. 3. Enter your [facility] Access Code exactly as it appears below. You will not need to use this code after youve completed the sign-up process. If you do not sign up before the expiration date, you must request a new code. · [facility] Access Code: [email or web address] Expires: 9/29/2017 12:13 PM 
 
4. Enter the last four digits of your Social Security Number (xxxx) and Date of Birth (mm/dd/yyyy) as indicated and click Submit. You will be taken to the next sign-up page. 5. Create a [facility] ID. This will be your [facility] login ID and cannot be changed, so think of one that is secure and easy to remember. 6. Create a [facility] password. You can change your password at any time. 7. Enter your Password Reset Question and Answer. This can be used at a later time if you forget your password. 8. Enter your e-mail address. You will receive e-mail notification when new information is available in [address]. 9. Click Sign Up. You can now view and download portions of your medical record. 10. Click the Download Summary menu link to download a portable copy of your medical information. If you have questions, please visit the Frequently Asked Questions section of the [facility] website. Remember, [facility] is NOT to be used for urgent needs. For medical emergencies, dial 911. Now available from your iPhone and Android! General Information Please provide this summary of care documentation to your next provider. Patient Signature:  ____________________________________________________________ Date:  ____________________________________________________________  
  
Gm Madrigal Provider Signature:  ____________________________________________________________ Date:  ____________________________________________________________

## 2017-07-01 NOTE — PROGRESS NOTES
Verbal bedside report given to oncoming nurse Debbie Sterling. Patient's situation, background, assessment and recommendations provided. Opportunity for questions provided. No s/s of pain noted. No distress noted. Oncoming RN assumed care of patient.

## 2017-07-02 NOTE — PROGRESS NOTES
Camilla El  Admission Date: 7/1/2017             Daily Progress Note: 7/2/2017   Patient is a 77 y.o.  male presents with falls. Patient has a history of HTN, HL, CAD s/p CABG 1998, re-do CABG 2003, PCI 12/2014, DM, and COPD. He quit smoking 1998 with approximate 60 pack year history, is maintained on Singulair and albuterol, and is not followed by a pulmonologist.  He provides minimal history and friend at bedside assists. Apparently he was brought to the ER today because of multiple falls this am - his daughter was aware of 2-3 and he states that he fell 4-5 times that she did not witness. He is not able to describe what happens when he falls and thinks that he may loose consciousness, +/- palpitations. His friend states that he was febrile yesterday and was taken to Urgent Care where CXR was negative and he was started on Z-pack for bronchitis. Patient denies any recent increase in cough, mucus production,increase in swelling or chest pain. He does report occasional palpitations. Patient also reports that he is having frequent loose stools but no nausea/vomiting. His BP was initially low with SBP in the 90s. He received 250 ml fluid bolus with improvement of BP to most recent reading of 144/66. Currently on RA with o2 sat 90s. Subjective:     PCT elevated at 6.9 yesterday. No events overnight.     Current Facility-Administered Medications   Medication Dose Route Frequency    albuterol (PROVENTIL HFA, VENTOLIN HFA, PROAIR HFA) inhaler 2 Puff  2 Puff Inhalation Q6H RT    ALPRAZolam (XANAX) tablet 0.25 mg  0.25 mg Oral BID PRN    aspirin chewable tablet 81 mg  81 mg Oral DAILY    atorvastatin (LIPITOR) tablet 80 mg  80 mg Oral QHS    carvedilol (COREG) tablet 25 mg  25 mg Oral BID WITH MEALS    ezetimibe (ZETIA) tablet 10 mg  10 mg Oral DAILY    glimepiride (AMARYL) tablet 2 mg  2 mg Oral 7am    guaiFENesin ER (MUCINEX) tablet 600 mg  600 mg Oral BID    levothyroxine (SYNTHROID) tablet 75 mcg  75 mcg Oral ACB    losartan (COZAAR) tablet 50 mg  50 mg Oral DAILY    montelukast (SINGULAIR) tablet 10 mg  10 mg Oral DAILY    nitroglycerin (NITROSTAT) tablet 0.4 mg  0.4 mg SubLINGual PRN    potassium chloride (K-DUR, KLOR-CON) SR tablet 20 mEq  20 mEq Oral DAILY    ticagrelor (BRILINTA) tablet 90 mg  90 mg Oral Q12H    zolpidem (AMBIEN) tablet 10 mg  10 mg Oral QHS PRN    sodium chloride (NS) flush 5-10 mL  5-10 mL IntraVENous Q8H    sodium chloride (NS) flush 5-10 mL  5-10 mL IntraVENous PRN    [START ON 7/3/2017] levoFLOXacin (LEVAQUIN) 750 mg in D5W IVPB  750 mg IntraVENous Q48H    acetaminophen (TYLENOL) tablet 650 mg  650 mg Oral Q4H PRN    naloxone (NARCAN) injection 0.4 mg  0.4 mg IntraVENous PRN    enoxaparin (LOVENOX) injection 40 mg  40 mg SubCUTAneous Q24H    furosemide (LASIX) tablet 40 mg  40 mg Oral DAILY         Objective:     Vitals:    07/02/17 0120 07/02/17 0449 07/02/17 0728 07/02/17 0802   BP:  139/67 157/78    Pulse:  88 91    Resp:  18 18    Temp:  (!) 101.4 °F (38.6 °C) 100 °F (37.8 °C)    SpO2: 97% 98% 96% 95%   Weight:       Height:         Intake and Output:   06/30 1901 - 07/02 0700  In: 240 [P.O.:240]  Out: 200 [Urine:200]       Physical Exam:          GEN: well developed and in no acute distress, Oxygen per 2LNC  HEENT:  PERRL, EOMI, no alar flaring or epistaxis, oral mucosa moist without cyanosis,   NECK:  no JVD, no retractions, no thyromegaly or masses,   LUNGS:  CTA  HEART:  RRR with no M,G,R;  ABDOMEN:  soft with no tenderness; positive bowel sounds present  EXTREMITIES:  warm with no cyanosis, 1+ lower leg edema  SKIN:  no jaundice or ecchymosis   NEURO:  alert and oriented, grossly non-focal    CHEST XRAY:   None today    LAB  Recent Labs      07/02/17   0655  07/01/17   1225   WBC  7.0  9.3   HGB  11.5*  11.9*   HCT  33.9*  34.9*   PLT  134*  115*     Recent Labs      07/02/17   0655  07/01/17   1225   NA  139  136   K  3.6  3.8   CL 105  100   CO2  24  26   GLU  97  185*   BUN  17  23   CREA  1.27  1.66*   MG   --   2.2   TROIQ   --   0.02     No results for input(s): PH, PCO2, PO2, HCO3 in the last 72 hours. No results for input(s): LCAD, LAC in the last 72 hours. 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 Range 7/1/2017 12:29 7/1/2017 21:18 7/2/2017 05:21   GLUCOSE,FAST - POC Latest Ref Range: 65 - 100 mg/dL  134 (H) 95       COPD (chronic obstructive pulmonary disease) (HCC) (Chronic) ICD-10-CM: J44.9  ICD-9-CM: 843  7/1/2017 Yes    Non exacerbated    Acute renal failure (ARF) (HCC) ICD-10-CM: N17.9  ICD-9-CM: 584.9  7/1/2017 Yes        Combined systolic and diastolic congestive heart failure (HCC) (Chronic) ICD-10-CM: I50.40  ICD-9-CM: 428.40  7/1/2017 Yes    Non decompensated    Frequent falls ICD-10-CM: R29.6  ICD-9-CM: V15.88  7/1/2017 Yes        Ischemic cardiomyopathy (Chronic) ICD-10-CM: I25.5  ICD-9-CM: 414.8  7/1/2017 Yes        CAP (community acquired pneumonia) ICD-10-CM: J18.9  ICD-9-CM: 516  7/1/2017 Unknown    Feels better- continue current care.   Likely home in next 24-48h- engage PT            More than 50% of time documented was spent in face-to-face contact with the patient and in the care of the patient on the floor/unit where the patient is located. Carlito Torres MD

## 2017-07-02 NOTE — PROGRESS NOTES
Verbal bedside report given to oncoming nurse [facility]. Patient's situation, background, assessment and recommendations provided. Opportunity for questions provided. No s/s of pain noted. No distress noted. Oncoming RN assumed care of patient.

## 2017-07-02 NOTE — PROGRESS NOTES
Pt is sitting up in bed visiting with family. No complaints or issues voiced at this time. No signs or symptoms of distress are noted. Call light is within reach. Will continue to monitor.

## 2017-07-02 NOTE — PROGRESS NOTES
Problem: Interdisciplinary Rounds  Goal: Interdisciplinary Rounds  Outcome: Progressing Towards Goal  Interdisciplinary team rounds were held 7/2/2017 with the following team members:Nursing and the patient. Plan of care discussed. See clinical pathway and/or care plan for interventions and desired outcomes.

## 2017-07-02 NOTE — PROGRESS NOTES
Bedside report received from night nurse Lauro Solorio. Assessment done as noted  Respiration even and unlabored 20/min; denies pain or nausea at present. Encouraged to call with needs.

## 2017-07-02 NOTE — PROGRESS NOTES
Problem: Mobility Impaired (Adult and Pediatric)  Goal: *Acute Goals and Plan of Care (Insert Text)  STG:  (1.)Mr. Zaida Chance will move from supine to sit and sit to supine , scoot up and down and roll side to side with CONTACT GUARD ASSIST within 3 day(s). (2.)Mr. Zaida Chance will transfer from bed to chair and chair to bed with CONTACT GUARD ASSIST using the least restrictive device within 3 day(s). (3.)Mr. Zaida Chance will ambulate with STAND BY ASSIST for 100 feet with the least restrictive device within 3 day(s). LTG:  (1.)Mr. Zaida Chance will move from supine to sit and sit to supine , scoot up and down and roll side to side in bed with SUPERVISION within 6 day(s). (2.)Mr. Zaida Chance will transfer from bed to chair and chair to bed with SUPERVISION using the least restrictive device within 6 day(s). (3.)Mr. Zaida Chance will ambulate with SUPERVISION for 200 feet with the least restrictive device within 6 day(s). ________________________________________________________________________________________________      PHYSICAL THERAPY: INITIAL ASSESSMENT, DAILY NOTE 7/2/2017  INPATIENT: Hospital Day: 2  Payor: Yannick Gregorio / Plan: [facility] MEDICARE CHOICE PPO/PFFS / Product Type: [employer] Medicare /      NAME/AGE/GENDER: Zhanna Barba is a 77 y.o. male     PRIMARY DIAGNOSIS: CAP (community acquired pneumonia) CAP (community acquired pneumonia) CAP (community acquired pneumonia)        ICD-10: Treatment Diagnosis:       · Generalized Muscle Weakness (M62.81)  · Other abnormalities of gait and mobility (R26.89)  · Repeated Falls (R29.6)  · History of falling (Z91.81)  · Facial weakness (R29.810)   Precaution/Allergies:  Mylanta [aluminum-magnesium hydroxide]       ASSESSMENT:      Mr. Zaida Chance presents with impaired gait, mobility, and endurance after hospitalization due to shortness of breath with CAP.   He demonstrated impaired oxygenation during transfers, gait, and mobility with his SpO2% decreasing from 96% down to 81% with 50 feet of ambulation using rolling walker and 2L O2. His O2 levels improved to 91% while sitting at the edge of bed for two minutes and patient was returned to bed. He will benefit from skilled PT to address his current impairments and return to prior level of function. This section established at most recent assessment   PROBLEM LIST (Impairments causing functional limitations):  1. Decreased Strength  2. Decreased ADL/Functional Activities  3. Decreased Transfer Abilities  4. Decreased Ambulation Ability/Technique  5. Decreased Balance  6. Increased Pain  7. Decreased Activity Tolerance  8. Decreased Work Simplification/Energy Conservation Techniques  9. Increased Fatigue  10. Increased Shortness of Breath  11. Decreased Flexibility/Joint Mobility  12. Decreased [place] with Home Exercise Program    INTERVENTIONS PLANNED: (Benefits and precautions of physical therapy have been discussed with the patient.)  1. Balance Exercise  2. Bed Mobility  3. Gait Training  4. Therapeutic Activites  5. Therapeutic Exercise/Strengthening  6. Transfer Training      TREATMENT PLAN: Frequency/Duration: 3 times a week for duration of hospital stay  Rehabilitation Potential For Stated Goals: GOOD      RECOMMENDED REHABILITATION/EQUIPMENT: (at time of discharge pending progress): Continue Skilled Therapy. HISTORY:   History of Present Injury/Illness (Reason for Referral): Patient admitted to ER after presenting with falls. Patient has a history of HTN, HL, CAD s/p CABG 1998, re-do CABG 2003, PCI 12/2014, DM, and COPD. He quit smoking 1998 with approximate 60 pack year history, is maintained on Singulair and albuterol, and is not followed by a pulmonologist.  He provides minimal history and friend at bedside assists. Apparently he was brought to the ER today because of multiple falls this am - his daughter was aware of 2-3 and he states that he fell 4-5 times that she did not witness.   He is not able to describe what happens when he falls and thinks that he may loose consciousness, +/- palpitations. His friend states that he was febrile yesterday and was taken to Urgent Care where CXR was negative and he was started on Z-pack for bronchitis. Past Medical History/Comorbidities:   Mr. Danny Prado  has a past medical history of Asthma; CAD (coronary artery disease); COPD; Diabetes ([facility] [address] Endocrine disease; Gastrointestinal disorder; Heart failure ([address] Hypertension; Other ill-defined conditions; and PUD (peptic ulcer disease). He also has no past medical history of Arthritis; Autoimmune disease ([facility] [address] Cancer ([facility] [address] Chronic kidney disease; DEMENTIA; Infectious disease; Liver disease; Neurological disorder; Psychiatric disorder; Seizures ([facility] [address] Sleep disorder; Stroke [facility]); or Thromboembolus ([facility] [address] Mr. Danny Prado  has a past surgical history that includes cardiac surg procedure unlist.  Social History/Living Environment:   Home Environment: Private residence  # Steps to Enter: 3  One/Two Story Residence: One story  Living Alone: Yes  Support Systems: Child(charles)  Patient Expects to be Discharged to[de-identified] Private residence  Current DME Used/Available at Home: [employer], [address] [employer] chair  Prior Level of Function/Work/Activity:  Independent with self care prior to recent functional decline      Number of Personal Factors/Comorbidities that affect the Plan of Care: 3+: HIGH COMPLEXITY   EXAMINATION:   Most Recent Physical Functioning:   Gross Assessment:  AROM: Generally decreased, functional  Strength: Generally decreased, functional               Posture:     Balance:  Sitting: Intact  Standing: Impaired; With support  Standing - Dynamic : Fair Bed Mobility:  Rolling: Minimum assistance  Supine to Sit: Minimum assistance  Sit to Supine: Contact guard assistance  Scooting: Minimum assistance  Wheelchair Mobility:     Transfers:  Sit to Stand: Minimum assistance  Stand to Sit: Contact guard assistance  Gait:     Base of Support: Widened  Speed/Vanessa: Slow  Distance (ft): 50 Feet (ft)  Assistive Device: Walker, rolling  Ambulation - Level of Assistance: Contact guard assistance  Duration: 10 Minutes       Body Structures Involved:  1. Nerves  2. Lungs  3. Muscles Body Functions Affected:  1. Respiratory  2. Neuromusculoskeletal  3. Movement Related Activities and Participation Affected:  1. Learning and Applying Knowledge  2. General Tasks and Demands  3. Mobility  4. Self Care  5. Domestic Life  6. Interpersonal Interactions and Relationships  7. Community, Social and [place]   Number of elements that affect the Plan of Care: 3: MODERATE COMPLEXITY   CLINICAL PRESENTATION:   Presentation: Evolving clinical presentation with changing clinical characteristics: MODERATE COMPLEXITY   CLINICAL DECISION MAKIN [facility] Inpatient Short Form  How much difficulty does the patient currently have. .. Unable A Lot A Little None   1. Turning over in bed (including adjusting bedclothes, sheets and blankets)? [ ] 1   [ ] 2   [X] 3   [ ] 4   2. Sitting down on and standing up from a chair with arms ( e.g., wheelchair, bedside commode, etc.)   [ ] 1   [ ] 2   [X] 3   [ ] 4   3. Moving from lying on back to sitting on the side of the bed? [ ] 1   [ ] 2   [X] 3   [ ] 4   How much help from another person does the patient currently need. .. Total A Lot A Little None   4. Moving to and from a bed to a chair (including a wheelchair)? [ ] 1   [ ] 2   [X] 3   [ ] 4   5. Need to walk in hospital room? [ ] 1   [ ] 2   [X] 3   [ ] 4   6. Climbing 3-5 steps with a railing? [ ] 1   [X] 2   [ ] 3   [ ] 4   © 2007, Trustees of [address] [place] [address] [address] [address], under license to [employer]. All rights reserved    Score:  Initial: 17 Most Recent: X (Date: -- )     Interpretation of Tool:  Represents activities that are increasingly more difficult (i.e. Bed mobility, Transfers, Gait).        Score 24 23 22-20 19-15 14-10 9-7 6       Modifier CH CI CJ CK CL CM CN         · Mobility - Walking and Moving Around:               - CURRENT STATUS:    CK - 40%-59% impaired, limited or restricted               - GOAL STATUS:           CI - 1%-19% impaired, limited or restricted               - D/C STATUS:                       ---------------To be determined---------------  Payor: [employer] MEDICARE / Plan: [facility] [employer] MEDICARE CHOICE PPO/PFFS / Product Type: Managed Care Medicare /       Medical Necessity:     · Skilled intervention continues to be required due to current functional limitations. Reason for Services/Other Comments:  · Patient continues to require skilled intervention due to current falls and impaired functional capacity. Use of outcome tool(s) and clinical judgement create a POC that gives a: Questionable prediction of patient's progress: MODERATE COMPLEXITY                 TREATMENT:   (In addition to Assessment/Re-Assessment sessions the following treatments were rendered)   Pre-treatment Symptoms/Complaints:  Patient reports that he is ready to get out of bed. Pain: Initial:   Pain Intensity 1: 0  Post Session:  0      Gait Training (10 Minutes):  Gait training to improve and/or restore physical functioning as related to mobility, strength and balance. Ambulated 50 Feet (ft) with Contact guard assistance using a Walker, rolling and moderate   related to their stance phase, stride length, push off and heel strike to promote proper body alignment, promote proper body posture and promote proper body mechanics. Instruction in performance of improved breathing techniques to correct impaired oxygenation.      Braces/Orthotics/Lines/Etc:   · O2 Device: Nasal cannula 2L  Treatment/Session Assessment:    · Response to Treatment:  SpO2% down to 81%  · Interdisciplinary Collaboration:  · Registered Nurse  · Certified Nursing Assistant/Patient Care Technician  · After treatment position/precautions:  · Supine in bed  · Bed alarm/tab alert on  · Bed/Chair-wheels locked  · Bed in low position  · Call light within reach  · RN notified  · Family at bedside  · Side rails x 2  · Compliance with Program/Exercises: compliant most of the time. · Recommendations/Intent for next treatment session: \"Next visit will focus on advancements to more challenging activities and reduction in assistance provided\".   Total Treatment Duration:  PT Patient Time In/Time Out  Time In: 1325  Time Out: Allan Duckworth 251, PT

## 2017-07-03 NOTE — PROGRESS NOTES
Alert. hepwell out. Gabriella Henderson RN to attempt restart. Patient with unlabored respirations. Nasal cannula in place. Family at bedside.

## 2017-07-03 NOTE — PROGRESS NOTES
Visit with patient to build rapport with . Patient is calm and engaging with . Listened as shared his journey about his illness. Demonstrates a hopeful spirit for a full recovery. Encouraged.   Signed by chaplain Josh

## 2017-07-03 NOTE — PROGRESS NOTES
Verbal bedside report given to oncoming nurse Alexus Patient's situation, background, assessment and recommendations provided. Opportunity for questions provided. No s/s of pain noted. No distress noted. Oncoming RN assumed care of patient.

## 2017-07-03 NOTE — PROGRESS NOTES
Bedside report received from night nurse Laure Calix. Assessment done as noted  Respiration even and unlabored 20/min; denies pain or nausea at present. Encouraged to call with needs.

## 2017-07-03 NOTE — PROGRESS NOTES
PT Daily Note  Attempted to see patient for physical therapy this afternoon but patient politely declines to participate stating he is in pain right now in his neck/shoulder region. This therapist offered to tell [facility] staff to see if he can have something for pain but patient stated he did not want to tell [facility] staff of pain because he \"doesn't want to take too much pain medication while in the hospital\". Will check back on patient tomorrow if schedule permits.   Thank you,  Latasha Pratt, PTA

## 2017-07-04 PROBLEM — N17.9 ACUTE RENAL FAILURE (ARF) (HCC): Status: RESOLVED | Noted: 2017-01-01 | Resolved: 2017-01-01

## 2017-07-04 NOTE — PROGRESS NOTES
Awake with unlabored respirations. Denies pain or needs. States sore neck feels better this morning.

## 2017-07-04 NOTE — PROGRESS NOTES
LTG: Patient will tolerate least restrictive diet without overt signs or symptoms of airway compromise. STG: Patient will tolerate mechanical soft diet and nectar thick liquids without overt signs or symptoms of airway compromise. STG: Patient will participate in modified barium swallow study as clinically indicated. Speech language pathology: bedside swallow note: Initial Assessment    NAME/AGE/GENDER: Kashif Hall is a 77 y.o. male  DATE: 7/4/2017  PRIMARY DIAGNOSIS: CAP (community acquired pneumonia)       ICD-10: Treatment Diagnosis: R13.12 Oropharyngeal Dysphagia. INTERDISCIPLINARY COLLABORATION: Registered Nurse and Physician  PRECAUTIONS/ALLERGIES: Mylanta [aluminum-magnesium hydroxide] ASSESSMENT:Based on the objective data described below, Mr. Jordon Casillas presents with mild-moderate oropharyngeal dysphagia. Patient reports history of swallowing difficulty, specifically with solid consistencies, since cardiac surgery in 1998. He reports food \"gets stuck in the wrong pocket\", resulting in coughing. Recent chest x-rays indicate Probable acute multifocal pneumonia. Patient reports consuming regular diet and thin liquids at home, but endorses increased difficulty with swallow since this hospitalization. Patient was presented with thin liquid via cup and straw; nectar thick liquid via cup and straw; honey thick; puree, and mechanical soft trials. Patient with wet vocal quality and delayed throat clear with both thin liquid via cup and straw. Increased respiratory rate also observed. Multiple swallows palpated with nectar thick liquid with patient reporting sensation of pharyngeal residue that cleared with additional swallows. However, no overt coughing or throat clearing noted. No improvement with honey thick consistencies. Multiple swallows also palpated with puree. Patient consumed mechanical soft diet with appropriate mastication and timely swallow initiation.  Single swallow palpated and appropriate oral clearing. Patient began complaining of light headedness following mechanical soft trials, which improved when patient was positioned supine. RN notified and BP likely contributing. Solid trials deferred  Recommend mechanical soft diet and nectar thick liquids. Medications whole with thickened liquids. Patient would benefit from modified barium swallow to objectively assess swallow function. Plan for swallow study to be completed on 7/5/17. Results and recommendations communicated to patient, RN, and physician at conclusion of evaluation. Patient will benefit from skilled intervention to address the below impairments. ?????? ? ? This section established at most recent assessment??????????  PROBLEM LIST (Impairments causing functional limitations):  1. Oropharyngeal dysphagia  REHABILITATION POTENTIAL FOR STATED GOALS: Good  PLAN OF CARE:   Patient will benefit from skilled intervention to address the following impairments. RECOMMENDATIONS AND PLANNED INTERVENTIONS (Benefits and precautions of therapy have been discussed with the patient.):  · PO:  Mechanical soft  · Liquids:  nectar  MEDICATIONS:  · With Thickened Liquid  COMPENSATORY STRATEGIES/MODIFICATIONS INCLUDING:  · Alternate liquids/solids  · Small sips and bites  OTHER RECOMMENDATIONS (including follow up treatment recommendations): · Family training/education  · Patient education  RECOMMENDED DIET MODIFICATIONS DISCUSSED WITH:  · Nursing  · PCP  · Patient  FREQUENCY/DURATION: Continue to follow patient 3 times a week for duration of hospital stay to address above goals. RECOMMENDED REHABILITATION/EQUIPMENT: (at time of discharge pending progress):   Continue Skilled Therapy. SUBJECTIVE:   \"My swallowing has been a problem for a while\"  History of Present Injury/Illness: Mr. Mirlande Colvin  has a past medical history of Asthma; CAD (coronary artery disease); COPD; Diabetes ([address] Endocrine disease; Gastrointestinal disorder; Heart failure ([address] Hypertension;  Other ill-defined conditions; and PUD (peptic ulcer disease). He also has no past medical history of Arthritis; Autoimmune disease ([facility] Cancer ([facility] [address] Chronic kidney disease; DEMENTIA; Infectious disease; Liver disease; Neurological disorder; Psychiatric disorder; Seizures ([facility] Sleep disorder; Stroke [facility]); or Thromboembolus ([facility] .  He also  has a past surgical history that includes cardiac surg procedure unlist.   Present Symptoms: Food \"sticking\"   Pain Intensity 1: 0  Pain Location 1: Back  Pain Intervention(s) 1: Medication (see MAR)  Current Medications:   No current facility-administered medications on file prior to encounter. Current Outpatient Prescriptions on File Prior to Encounter   Medication Sig Dispense Refill    azithromycin (ZITHROMAX Z-CARLOS MANUEL) 250 mg tablet 2 pills day 1 then 1 pill daily for 4 days 6 Tab 0    carvedilol (COREG) 25 mg tablet Take 1 tablet by mouth two (2) times daily (with meals). 60 tablet 6    aspirin 81 mg chewable tablet Take 1 tablet by mouth daily.  losartan (COZAAR) 50 mg tablet Take 1 tablet by mouth daily. 30 tablet 6    guaiFENesin ER (MUCINEX) 600 mg ER tablet Take 600 mg by mouth two (2) times a day.  montelukast (SINGULAIR) 10 mg tablet Take 10 mg by mouth daily.  nitroglycerin (NITROSTAT) 0.4 mg SL tablet by SubLINGual route every five (5) minutes as needed for Chest Pain.  albuterol (VENTOLIN HFA) 90 mcg/actuation inhaler Take  by inhalation as needed for Wheezing.  ALPRAZolam (XANAX) 0.25 mg tablet Take 0.25 mg by mouth two (2) times daily as needed for Anxiety.  furosemide (LASIX) 40 mg tablet Take 40 mg by mouth two (2) times a day. 20 mg in am, 40 mg hs  Indications: takes 1/2 pill on saturday and sunday, then 40 mg rest of the week.  glimepiride (AMARYL) 2 mg tablet Take 2 mg by mouth every morning.  tiotropium (SPIRIVA WITH HANDIHALER) 18 mcg inhalation capsule Take 1 Cap by inhalation daily.         levothyroxine (SYNTHROID) 75 mcg tablet Take 75 mcg by mouth Daily (before breakfast). Current Dietary Status:  Regular/thin     Social History/Home Situation:    Home Environment: Private residence  # Steps to Enter: 3  One/Two Story Residence: One story  Living Alone: Yes  Support Systems: Child(charles)  Patient Expects to be Discharged to[de-identified] Private residence  Current DME Used/Available at Home: Cane, straight, Shower chair  OBJECTIVE:   Respiratory Status:  Nasal cannula  2 l/min  CXR Results:Probable acute multifocal pneumonia. MRI/CT Results:N/A  Oral Motor Structure/Speech:  Oral-Motor Structure/Motor Speech  Labial: No impairment  Dentition: Intact  Lingual: No impairment    Cognitive and Communication Status:  Neurologic State: Alert  Orientation Level: Oriented X4  Cognition: Follows commands  Perception: Appears intact  Perseveration: No perseveration noted       BEDSIDE SWALLOW EVALUATION  Oral Assessment:  Oral Assessment  Labial: No impairment  Dentition: Intact  Lingual: No impairment  P.O. Trials:  Patient Position: Upright in bedside chair    The patient was given tsp-small bite amounts of the following:   Consistency Presented: Thin liquid;Puree;Mixed consistency; Nectar thick liquid;Honey thick liquid  How Presented: Self-fed/presented;Straw;Cup/sip;Spoon    ORAL PHASE:  Bolus Acceptance: No impairment  Bolus Formation/Control: No impairment  Propulsion: No impairment     Oral Residue: None    PHARYNGEAL PHASE:  Initiation of Swallow: No impairment  Laryngeal Elevation: Functional  Aspiration Signs/Symptoms: Change vocal quality;Clear throat; Infiltrate on chest xray; Increase in RR  Vocal Quality: No impairment  Cues for Modifications: Minimal  Effective Modifications: Small sips and bites     Pharyngeal Phase Characteristics: Multiple swallows; Suspected pharyngeal residue    OTHER OBSERVATIONS:  Rate/bite size: WNL   Endurance:  Impaired     Tool Used: Dysphagia Outcome and Severity Scale (IKER)    Score Comments   Normal Diet  [] 7 With no strategies or extra time needed   Functional Swallow  [] 6 May have mild oral or pharyngeal delay       Mild Dysphagia    [] 5 Which may require one diet consistency restricted (those who demonstrate penetration which is entirely cleared on MBS would be included)   Mild-Moderate Dysphagia  [x] 4 With 1-2 diet consistencies restricted       Moderate Dysphagia  [] 3 With 2 or more diet consistencies restricted       Moderately Severe Dysphagia  [] 2 With partial PO strategies (trials with ST only)       Severe Dysphagia  [] 1 With inability to tolerate any PO safely          Score:  Initial: 4 Most Recent: X (Date: -- )   Interpretation of Tool: The Dysphagia Outcome and Severity Scale (IKER) is a simple, easy-to-use, 7-point scale developed to systematically rate the functional severity of dysphagia based on objective assessment and make recommendations for diet level, independence level, and type of nutrition. Score 7 6 5 4 3 2 1   Modifier CH CI CJ CK CL CM CN   ?  Swallowing:     - CURRENT STATUS: CK - 40%-59% impaired, limited or restricted    - GOAL STATUS:  CJ - 20%-39% impaired, limited or restricted    - D/C STATUS:  ---------------To be determined---------------  Payor: [employer] MEDICARE / Plan: [facility] [employer] MEDICARE CHOICE PPO/PFFS / Product Type: Managed Care Medicare /     TREATMENT:    (In addition to Assessment/Re-Assessment sessions the following treatments were rendered)  Assessment/Reassessment only, no treatment provided today  MODALITIES:                                                                    ORAL MOTOR  EXERCISES:                                                                                                                                                                      LARYNGEAL / PHARYNGEAL EXERCISES: __________________________________________________________________________________________________  Safety:   After treatment position/precautions:  · RN notified  · Physician notified  · Upright in Bed  Treatment Assessment:  Patient presents with mild-moderate oropharyngeal dysphagia. Recommend mechanical soft diet and nectar thick liquid. Modified barium swallow study is recommended to objectively assess swallow function. Progression/Medical Necessity:   · Patient is expected to demonstrate progress in swallow strength, swallow function, diet tolerance and swallow safety to improve swallow safety and decrease aspiration risk. Compliance with Program/Exercises: Will assess as treatment progresses. Reason for Continuation of Services/Other Comments:  · Patient continues to require present interventions due to patient's inability to safely consume po diet. Recommendations/Intent for next treatment session: \"Treatment next visit will focus on advancements to more challenging activities and reduction in assistance provided\".     Total Treatment Duration:  Time In: 1043  Time Out: 1106    LEANNA Edward, CCC-SLP, CBKEISHA

## 2017-07-04 NOTE — PROGRESS NOTES
Dana Garrido  Admission Date: 7/1/2017             Daily Progress Note: 7/4/2017   Patient is a 77 y.o.  male presented with falls. Saulo Gonzales has a history of HTN, HL, CAD s/p CABG 1998, re-do CABG 2003, PCI 12/2014, DM, and COPD.  He quit smoking 1998 with approximate 60 pack year history, is maintained on Singulair and albuterol, and is not followed by a pulmonologist. Xenia Rosario provides minimal history and friend at bedside assists.  Apparently he was brought to the ER today because of multiple falls this am - his daughter was aware of 2-3 and he states that he fell 4-5 times that she did not witness. Xenia Rosario is not able to describe what happens when he falls and thinks that he may loose consciousness, +/- palpitations.  His friend states that he was febrile yesterday and was taken to Urgent Care where CXR was negative and he was started on Z-pack for bronchitis.  Patient denies any recent increase in cough, mucus production,increase in swelling or chest pain.  He does report occasional palpitations.  Patient also reports that he is having frequent loose stools but no nausea/vomiting.  His BP was initially low with SBP in the 90s.  He received 250 ml fluid bolus with improvement of BP to most recent reading of 144/66.  Currently on RA with o2 sat 90s.      Subjective:     PCT elevated, on levaquin. Afebrile now. ambulat ing with walker needing 4 lpm, previously not on O2.      Review of Systems  Constitutional: positive for malaise  Respiratory: positive for cough, sputum or dyspnea on exertion  Cardiovascular: negative for chest pain, chest pressure/discomfort    Current Facility-Administered Medications   Medication Dose Route Frequency    methyl salicylate-menthol (BENGAY) 15-10 % cream   Topical TID PRN    levoFLOXacin (LEVAQUIN) 750 mg in D5W IVPB  750 mg IntraVENous Q24H    albuterol (PROVENTIL HFA, VENTOLIN HFA, PROAIR HFA) inhaler 2 Puff  2 Puff Inhalation Q6H RT    ALPRAZolam [place]) tablet 0.25 mg  0.25 mg Oral BID PRN    aspirin chewable tablet 81 mg  81 mg Oral DAILY    atorvastatin (LIPITOR) tablet 80 mg  80 mg Oral QHS    carvedilol (COREG) tablet 25 mg  25 mg Oral BID WITH MEALS    ezetimibe (ZETIA) tablet 10 mg  10 mg Oral DAILY    glimepiride (AMARYL) tablet 2 mg  2 mg Oral 7am    guaiFENesin ER (MUCINEX) tablet 600 mg  600 mg Oral BID    levothyroxine (SYNTHROID) tablet 75 mcg  75 mcg Oral ACB    losartan (COZAAR) tablet 50 mg  50 mg Oral DAILY    montelukast (SINGULAIR) tablet 10 mg  10 mg Oral DAILY    nitroglycerin (NITROSTAT) tablet 0.4 mg  0.4 mg SubLINGual PRN    potassium chloride (K-DUR, KLOR-CON) SR tablet 20 mEq  20 mEq Oral DAILY    ticagrelor (BRILINTA) tablet 90 mg  90 mg Oral Q12H    zolpidem (AMBIEN) tablet 10 mg  10 mg Oral QHS PRN    sodium chloride (NS) flush 5-10 mL  5-10 mL IntraVENous Q8H    sodium chloride (NS) flush 5-10 mL  5-10 mL IntraVENous PRN    acetaminophen (TYLENOL) tablet 650 mg  650 mg Oral Q4H PRN    naloxone (NARCAN) injection 0.4 mg  0.4 mg IntraVENous PRN    enoxaparin (LOVENOX) injection 40 mg  40 mg SubCUTAneous Q24H    furosemide (LASIX) tablet 40 mg  40 mg Oral DAILY         Objective:     Vitals:    07/04/17 0508 07/04/17 0614 07/04/17 0700 07/04/17 0801   BP: 120/64  110/73    Pulse: 70  83    Resp: 18  16    Temp: 98.7 °F (37.1 °C)  98.8 °F (37.1 °C)    SpO2: 98%  95% 96%   Weight:  201 lb 8 oz (91.4 kg)     Height:         Intake and Output:   07/02 1901 - 07/04 0700  In: 480 [P.O.:480]  Out: 1500 [Urine:1500]       Physical Exam:          Constitutional: the patient is weak  HEENT: Sclera clear, pupils equal, oral mucosa moist  Lungs: clear anteriorly on 4 lpm ambulating, + productive cough  Cardiovascular: RRR without M,G,R  Abd/GI: soft and non-tender; with positive bowel sounds. Ext: warm without cyanosis. There is no lower leg edema.   Musculoskeletal: moves all four extremities with equal strength  Skin: no jaundice or rashes, no wounds   Neuro: no gross neuro deficits       Lines/Drains: IV  Nutrition: ADA    CHEST XRAY:       LAB  Recent Labs      07/02/17   0655  07/01/17   1225   WBC  7.0  9.3   HGB  11.5*  11.9*   HCT  33.9*  34.9*   PLT  134*  115*     Recent Labs      07/02/17   0655  07/01/17   1225   NA  139  136   K  3.6  3.8   CL  105  100   CO2  24  26   GLU  97  185*   BUN  17  23   CREA  1.27  1.66*   MG   --   2.2   TROIQ   --   0.02     Sputum  GRAM STAIN 0 TO 3   EPITHELIAL CELLS SEEN   /OIF     Final   GRAM STAIN MODERATE   GRAM POSITIVE COCCI      Final   GRAM STAIN MODERATE   GRAM NEGATIVE RODS      Final   GRAM STAIN FEW   GRAM POSITIVE RODS      Final   GRAM STAIN 3+ MUCPR  Final   Culture result: LIGHT   NORMAL RESPIRATORY MIRANDA               Assessment:     Patient Active Problem List   Diagnosis Code    Unstable angina (HCC) I20.0    HTN (hypertension) I10    Dyslipidemia E78.5    Diabetes mellitus ([facility] E11.9    COPD (chronic obstructive pulmonary disease) (HCC) J44.9    CAD (coronary artery disease) I25.10    Acute renal failure (ARF) (HCC) N17.9    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 Repeat PCT tomorrow  -- mobilize, worked with PT and no need for rehab but will work with him here. -- needing 4 lpm with ambulation and 2 lpm at rest.   -- ask speech to see R/O aspiration  -- repeat CXR in am    Patricia Police, NP    More than 50% of time documented was spent in face-to-face contact with the patient and in the care of the patient on the floor/unit where the patient is located. Lungs: few trace rhonchi  Heart:  RRR with no Murmur/Rubs/Gallops    Additional Comments: Slow improvement. Trying to wean oxygen. MBS tomorrow along with CXR and PCT. I have spoken with and examined the patient. I agree with the above assessment and plan as documented.     Jose Dee MD

## 2017-07-04 NOTE — PROGRESS NOTES
Problem: Mobility Impaired (Adult and Pediatric)  Goal: *Acute Goals and Plan of Care (Insert Text)  STG:  (1.)Mr. Archana Ford will move from supine to sit and sit to supine , scoot up and down and roll side to side with CONTACT GUARD ASSIST within 3 day(s). (2.)Mr. Archana Ford will transfer from bed to chair and chair to bed with CONTACT GUARD ASSIST using the least restrictive device within 3 day(s). GOAL MET 7/4/2017  (3.)Mr. Archana Ford will ambulate with STAND BY ASSIST for 100 feet with the least restrictive device within 3 day(s). LTG:  (1.)Mr. Archana Ford will move from supine to sit and sit to supine , scoot up and down and roll side to side in bed with SUPERVISION within 6 day(s). (2.)Mr. Archana Ford will transfer from bed to chair and chair to bed with SUPERVISION using the least restrictive device within 6 day(s). (3.)Mr. Archana Ford will ambulate with SUPERVISION for 200 feet with the least restrictive device within 6 day(s). PHYSICAL THERAPY: Daily Note, Treatment Day: 1st and AM 7/4/2017  INPATIENT: Hospital Day: 4  Payor: Emerita Madison / Plan: [facility] HUMANA MEDICARE CHOICE PPO/PFFS / Product Type: [employer] [address] Care Medicare /      NAME/AGE/GENDER: Real Bravo is a 77 y.o. male     PRIMARY DIAGNOSIS: CAP (community acquired pneumonia) CAP (community acquired pneumonia) CAP (community acquired pneumonia)        ICD-10: Treatment Diagnosis:       · Generalized Muscle Weakness (M62.81)  · Other abnormalities of gait and mobility (R26.89)  · Repeated Falls (R29.6)  · History of falling (Z91.81)  · Facial weakness (R29.810)   Precaution/Allergies:  Mylanta [aluminum-magnesium hydroxide]       ASSESSMENT:      Mr. Archana Ford was supine upon contact and agreeable to PT. Patient able to perform supine to sit with SBA and transfer to standing with CGA. Once standing patient states he needs to use the [facility]. Patient able to perform transfer to [facility] with CGA and rolling walker. O2 sats maintained above 90% on 2L O2 during transfer.  Patient was then able to increase gait distance to 80' with use of rolling walker, CGA and occasional cues for sequencing with rolling walker. O2 sats dropped to 84% during ambulation. Increased O2 to 3L with sats increasing to 86%. Instructed patient in pursed lipped breathing. Patient returns to recliner chair where O2 sats quickly increased to 93% with rest. O2 decreased to 2L with O2 sats maintaining above 90%. Overall slow, steady progress towards physical therapy goals. Above goals in red have been met thus far. Will continue efforts. This section established at most recent assessment   PROBLEM LIST (Impairments causing functional limitations):  1. Decreased Strength  2. Decreased ADL/Functional Activities  3. Decreased Transfer Abilities  4. Decreased Ambulation Ability/Technique  5. Decreased Balance  6. Increased Pain  7. Decreased Activity Tolerance  8. Decreased Work Simplification/Energy Conservation Techniques  9. Increased Fatigue  10. Increased Shortness of Breath  11. Decreased Flexibility/Joint Mobility  12. Decreased [place] with Home Exercise Program    INTERVENTIONS PLANNED: (Benefits and precautions of physical therapy have been discussed with the patient.)  1. Balance Exercise  2. Bed Mobility  3. Gait Training  4. Therapeutic Activites  5. Therapeutic Exercise/Strengthening  6. Transfer Training      TREATMENT PLAN: Frequency/Duration: 3 times a week for duration of hospital stay  Rehabilitation Potential For Stated Goals: GOOD      RECOMMENDED REHABILITATION/EQUIPMENT: (at time of discharge pending progress): Continue Skilled Therapy. HISTORY:   History of Present Injury/Illness (Reason for Referral): Patient admitted to ER after presenting with falls. Patient has a history of HTN, HL, CAD s/p CABG 1998, re-do CABG 2003, PCI 12/2014, DM, and COPD.   He quit smoking 1998 with approximate 60 pack year history, is maintained on Singulair and albuterol, and is not followed by a pulmonologist.  He provides minimal history and friend at bedside assists. Apparently he was brought to the ER today because of multiple falls this am - his daughter was aware of 2-3 and he states that he fell 4-5 times that she did not witness. He is not able to describe what happens when he falls and thinks that he may loose consciousness, +/- palpitations. His friend states that he was febrile yesterday and was taken to Urgent Care where CXR was negative and he was started on Z-pack for bronchitis. Past Medical History/Comorbidities:   Mr. Mirlande Colvin  has a past medical history of Asthma; CAD (coronary artery disease); COPD; Diabetes ([address] Endocrine disease; Gastrointestinal disorder; Heart failure ([address] Hypertension; Other ill-defined conditions; and PUD (peptic ulcer disease). He also has no past medical history of Arthritis; Autoimmune disease ([facility] [address] Cancer ([facility] [address] Chronic kidney disease; DEMENTIA; Infectious disease; Liver disease; Neurological disorder; Psychiatric disorder; Seizures ([facility] [address] Sleep disorder; Stroke [facility]); or Thromboembolus ([facility] [address]   Mr. Mirlande Colvin  has a past surgical history that includes cardiac surg procedure unlist.  Social History/Living Environment:   Home Environment: Private residence  # Steps to Enter: 3  One/Two Story Residence: One story  Living Alone: Yes  Support Systems: Child(charles)  Patient Expects to be Discharged to[de-identified] Private residence  Current DME Used/Available at Home: zen [employer], [facility] [employer]io chair  Prior Level of Function/Work/Activity:  Independent with self care prior to recent functional decline      Number of Personal Factors/Comorbidities that affect the Plan of Care: 3+: HIGH COMPLEXITY   EXAMINATION:   Most Recent Physical Functioning:   Gross Assessment:                  Posture:     Balance:  Sitting: Intact  Standing: Impaired  Standing - Static: Good  Standing - Dynamic : Fair Bed Mobility:  Supine to Sit: Stand-by asssistance  Sit to Supine:  (NT)  Wheelchair Mobility:     Transfers:  Sit to Stand: Contact guard assistance  Stand to Sit: Contact guard assistance  Gait:     Base of Support: Widened  Speed/Vanessa: Slow  Gait Abnormalities: Decreased step clearance;Trunk sway increased  Distance (ft): 90 Feet (ft)  Assistive Device: Walker, rolling  Ambulation - Level of Assistance: Contact guard assistance  Interventions: Safety awareness training; Tactile cues; Verbal cues       Body Structures Involved:  1. Nerves  2. Lungs  3. Muscles Body Functions Affected:  1. Respiratory  2. Neuromusculoskeletal  3. Movement Related Activities and Participation Affected:  1. Learning and Applying Knowledge  2. General Tasks and Demands  3. Mobility  4. Self Care  5. Domestic Life  6. Interpersonal Interactions and Relationships  7. Community, Social and [place]   Number of elements that affect the Plan of Care: 3: MODERATE COMPLEXITY   CLINICAL PRESENTATION:   Presentation: Evolving clinical presentation with changing clinical characteristics: MODERATE COMPLEXITY   CLINICAL DECISION MAKIN [facility] Mobility Inpatient Short Form  How much difficulty does the patient currently have. .. Unable A Lot A Little None   1. Turning over in bed (including adjusting bedclothes, sheets and blankets)? [ ] 1   [ ] 2   [X] 3   [ ] 4   2. Sitting down on and standing up from a chair with arms ( e.g., wheelchair, bedside commode, etc.)   [ ] 1   [ ] 2   [X] 3   [ ] 4   3. Moving from lying on back to sitting on the side of the bed? [ ] 1   [ ] 2   [X] 3   [ ] 4   How much help from another person does the patient currently need. .. Total A Lot A Little None   4. Moving to and from a bed to a chair (including a wheelchair)? [ ] 1   [ ] 2   [X] 3   [ ] 4   5. Need to walk in hospital room? [ ] 1   [ ] 2   [X] 3   [ ] 4   6. Climbing 3-5 steps with a railing?    [ ] 1   [X] 2   [ ] 3   [ ] 4   © , Trustees of [facility] [employer], under license to [employer]. All rights reserved    Score:  Initial: 17 Most Recent: X (Date: -- )     Interpretation of Tool:  Represents activities that are increasingly more difficult (i.e. Bed mobility, Transfers, Gait). Score 24 23 22-20 19-15 14-10 9-7 6       Modifier CH CI CJ CK CL CM CN         · Mobility - Walking and Moving Around:               - CURRENT STATUS:    CK - 40%-59% impaired, limited or restricted               - GOAL STATUS:           CI - 1%-19% impaired, limited or restricted               - D/C STATUS:                       ---------------To be determined---------------  Payor: HUMANA MEDICARE / Plan: [facility] HUMANA MEDICARE CHOICE PPO/PFFS / Product Type: [employer] Care Medicare /       Medical Necessity:     · Skilled intervention continues to be required due to current functional limitations. Reason for Services/Other Comments:  · Patient continues to require skilled intervention due to current falls and impaired functional capacity. Use of outcome tool(s) and clinical judgement create a POC that gives a: Questionable prediction of patient's progress: MODERATE COMPLEXITY                 TREATMENT:   (In addition to Assessment/Re-Assessment sessions the following treatments were rendered)   Pre-treatment Symptoms/Complaints:  Patient reports that he is ready to get out of bed. Pain: Initial:   Pain Intensity 1: 0  Post Session:  0      Therapeutic Activity: (    24 Minutes): Therapeutic activities including bed mobility training, transfer training from various surface heights, static/dynamic standing balance activities, ambulation on level ground, instruction in pursed lipped breathing, and patient education to improve mobility, strength, balance and activity tolerance. Required moderate Safety awareness training; Tactile cues; Verbal cues to promote static and dynamic balance in standing and promote coordination of bilateral, lower extremity(s). Braces/Orthotics/Lines/Etc:   · O2 Device: Nasal cannula 2L  Treatment/Session Assessment:    · Response to Treatment:  See above - O2 sats decreased with activity  · Interdisciplinary Collaboration:  · Physical Therapy Assistant and Registered Nurse  · After treatment position/precautions:  · Up in chair, Bed alarm/tab alert on, Bed/Chair-wheels locked, Call light within reach and RN notified  · Compliance with Program/Exercises: compliant most of the time. · Recommendations/Intent for next treatment session: \"Next visit will focus on advancements to more challenging activities and reduction in assistance provided\".   Total Treatment Duration:PT Patient Time In/Time Out  Time In: 1006  Time Out: [address] El[address], PTA

## 2017-07-05 NOTE — PROGRESS NOTES
Problem: Mobility Impaired (Adult and Pediatric)  Goal: *Acute Goals and Plan of Care (Insert Text)  STG:  (1.)Mr. Hoang Paiz will move from supine to sit and sit to supine , scoot up and down and roll side to side with CONTACT GUARD ASSIST within 3 day(s). (2.)Mr. Hoang Paiz will transfer from bed to chair and chair to bed with CONTACT GUARD ASSIST using the least restrictive device within 3 day(s). GOAL MET 7/4/2017  (3.)Mr. Hoang Paiz will ambulate with STAND BY ASSIST for 100 feet with the least restrictive device within 3 day(s). LTG:  (1.)Mr. Hoang Paiz will move from supine to sit and sit to supine , scoot up and down and roll side to side in bed with SUPERVISION within 6 day(s). (2.)Mr. Hoang Paiz will transfer from bed to chair and chair to bed with SUPERVISION using the least restrictive device within 6 day(s). (3.)Mr. Hoang Paiz will ambulate with SUPERVISION for 200 feet with the least restrictive device within 6 day(s). PHYSICAL THERAPY: Daily Note, Treatment Day: 2nd and AM 7/5/2017  INPATIENT: Hospital Day: 5  Payor: Gabriel Eisenmenger / Plan: [facility] HUMANA MEDICARE CHOICE PPO/PFFS / Product Type: [employer] Care Medicare /      NAME/AGE/GENDER: [place] is a 77 y.o. male     PRIMARY DIAGNOSIS: CAP (community acquired pneumonia) CAP (community acquired pneumonia) CAP (community acquired pneumonia)        ICD-10: Treatment Diagnosis:       · Generalized Muscle Weakness (M62.81)  · Other abnormalities of gait and mobility (R26.89)  · Repeated Falls (R29.6)  · History of falling (Z91.81)  · Facial weakness (R29.810)   Precaution/Allergies:  Mylanta [aluminum-magnesium hydroxide]       ASSESSMENT:      Mr. Hoang Paiz was supine upon contact and agreeable to PT. Patient able to perform supine to sit with SBA and transfer to standing with CGA. Once standing patient able to increase gait distance to 250' without assistive device demonstrating +LOB x 3 requiring PT intervention of min assist to correct LOB.  Patient is more steady when utilizing rolling walker. Oximeter placed on patient's finger throughout ambulation but did not read until patient was back to his room where O2 sats read at 75%. Patient sat on EOB where he was instructed in pursed lipped breathing. O2 sats quickly recovered to above 90% with rest and pursed lipped breathing. Overall slow, steady progress towards physical therapy goals. No additional goals have been met today. . Will continue efforts. This section established at most recent assessment   PROBLEM LIST (Impairments causing functional limitations):  1. Decreased Strength  2. Decreased ADL/Functional Activities  3. Decreased Transfer Abilities  4. Decreased Ambulation Ability/Technique  5. Decreased Balance  6. Increased Pain  7. Decreased Activity Tolerance  8. Decreased Work Simplification/Energy Conservation Techniques  9. Increased Fatigue  10. Increased Shortness of Breath  11. Decreased Flexibility/Joint Mobility  12. Decreased [place] with Home Exercise Program    INTERVENTIONS PLANNED: (Benefits and precautions of physical therapy have been discussed with the patient.)  1. Balance Exercise  2. Bed Mobility  3. Gait Training  4. Therapeutic Activites  5. Therapeutic Exercise/Strengthening  6. Transfer Training      TREATMENT PLAN: Frequency/Duration: 3 times a week for duration of hospital stay  Rehabilitation Potential For Stated Goals: GOOD      RECOMMENDED REHABILITATION/EQUIPMENT: (at time of discharge pending progress): Continue Skilled Therapy. HISTORY:   History of Present Injury/Illness (Reason for Referral): Patient admitted to ER after presenting with falls. Patient has a history of HTN, HL, CAD s/p CABG 1998, re-do CABG 2003, PCI 12/2014, DM, and COPD. He quit smoking 1998 with approximate 60 pack year history, is maintained on Singulair and albuterol, and is not followed by a pulmonologist.  He provides minimal history and friend at bedside assists.   Apparently he was brought to the ER today because of multiple falls this am - his daughter was aware of 2-3 and he states that he fell 4-5 times that she did not witness. He is not able to describe what happens when he falls and thinks that he may loose consciousness, +/- palpitations. His friend states that he was febrile yesterday and was taken to Urgent Care where CXR was negative and he was started on Z-pack for bronchitis. Past Medical History/Comorbidities:   Mr. Chin Angel  has a past medical history of Asthma; CAD (coronary artery disease); COPD; Diabetes ([facility] [address] Endocrine disease; Gastrointestinal disorder; Heart failure ([facility] [address] Hypertension; Other ill-defined conditions; and PUD (peptic ulcer disease). He also has no past medical history of Arthritis; Autoimmune disease ([facility] [address] Cancer ([facility] [address] Chronic kidney disease; DEMENTIA; Infectious disease; Liver disease; Neurological disorder; Psychiatric disorder; Seizures ([facility] [address] Sleep disorder; Stroke [facility]); or Thromboembolus ([facility] [address]   Mr. Chin Angel  has a past surgical history that includes cardiac surg procedure unlist.  Social History/Living Environment:   Home Environment: Private residence  # Steps to Enter: 3  One/Two Story Residence: One story  Living Alone: Yes  Support Systems: Child(charles)  Patient Expects to be Discharged to[de-identified] Private residence  Current DME Used/Available at Home: zen Gomez, [address] [facility] [employer] chair  Prior Level of Function/Work/Activity:  Independent with self care prior to recent functional decline      Number of Personal Factors/Comorbidities that affect the Plan of Care: 3+: HIGH COMPLEXITY   EXAMINATION:   Most Recent Physical Functioning:   Gross Assessment:                  Posture:     Balance:  Sitting: Intact  Standing: Impaired  Standing - Static: Fair  Standing - Dynamic : Fair Bed Mobility:  Supine to Sit: Stand-by asssistance  Wheelchair Mobility:     Transfers:  Sit to Stand: Contact guard assistance  Stand to Sit: Contact guard assistance  Gait:     Base of Support: Widened  Speed/Vanessa: Slow  Gait Abnormalities: Decreased step clearance;Trunk sway increased; Path deviations  Distance (ft): 250 Feet (ft)  Ambulation - Level of Assistance: Contact guard assistance;Minimal assistance  Interventions: Safety awareness training; Tactile cues; Verbal cues       Body Structures Involved:  1. Nerves  2. Lungs  3. Muscles Body Functions Affected:  1. Respiratory  2. Neuromusculoskeletal  3. Movement Related Activities and Participation Affected:  1. Learning and Applying Knowledge  2. General Tasks and Demands  3. Mobility  4. Self Care  5. Domestic Life  6. Interpersonal Interactions and Relationships  7. Community, Social and [place]   Number of elements that affect the Plan of Care: 3: MODERATE COMPLEXITY   CLINICAL PRESENTATION:   Presentation: Evolving clinical presentation with changing clinical characteristics: MODERATE COMPLEXITY   CLINICAL DECISION MAKIN [facility] Inpatient Short Form  How much difficulty does the patient currently have. .. Unable A Lot A Little None   1. Turning over in bed (including adjusting bedclothes, sheets and blankets)? [ ] 1   [ ] 2   [X] 3   [ ] 4   2. Sitting down on and standing up from a chair with arms ( e.g., wheelchair, bedside commode, etc.)   [ ] 1   [ ] 2   [X] 3   [ ] 4   3. Moving from lying on back to sitting on the side of the bed? [ ] 1   [ ] 2   [X] 3   [ ] 4   How much help from another person does the patient currently need. .. Total A Lot A Little None   4. Moving to and from a bed to a chair (including a wheelchair)? [ ] 1   [ ] 2   [X] 3   [ ] 4   5. Need to walk in hospital room? [ ] 1   [ ] 2   [X] 3   [ ] 4   6. Climbing 3-5 steps with a railing? [ ] 1   [X] 2   [ ] 3   [ ] 4   © , Trustees of [address] [place] [address] [address] [address], under license to [employer].  All rights reserved    Score:  Initial: 17 Most Recent: X (Date: -- )     Interpretation of Tool:  Represents activities that are increasingly more difficult (i.e. Bed mobility, Transfers, Gait). Score 24 23 22-20 19-15 14-10 9-7 6       Modifier CH CI CJ CK CL CM CN         · Mobility - Walking and Moving Around:               - CURRENT STATUS:    CK - 40%-59% impaired, limited or restricted               - GOAL STATUS:           CI - 1%-19% impaired, limited or restricted               - D/C STATUS:                       ---------------To be determined---------------  Payor: HUMANA MEDICARE / Plan: [facility] HUMANA MEDICARE CHOICE PPO/PFFS / Product Type: Managed Care Medicare /       Medical Necessity:     · Skilled intervention continues to be required due to current functional limitations. Reason for Services/Other Comments:  · Patient continues to require skilled intervention due to current falls and impaired functional capacity. Use of outcome tool(s) and clinical judgement create a POC that gives a: Questionable prediction of patient's progress: MODERATE COMPLEXITY                 TREATMENT:   (In addition to Assessment/Re-Assessment sessions the following treatments were rendered)   Pre-treatment Symptoms/Complaints:  Patient reports that he is ready to get out of bed. Pain: Initial:   Pain Intensity 1: 0  Post Session:  0      Therapeutic Activity: (    16 Minutes): Therapeutic activities including bed mobility training, transfer training, static/dynamic standing balance activities, ambulation on level ground, instruction in pursed lipped breathing, and patient education to improve mobility, strength, balance and activity tolerance. Required moderate Safety awareness training; Tactile cues; Verbal cues to promote static and dynamic balance in standing and promote coordination of bilateral, lower extremity(s).             Braces/Orthotics/Lines/Etc:   · O2 Device: Nasal cannula 2L  Treatment/Session Assessment:    · Response to Treatment:  See above - O2 sats decreased with activity  · Interdisciplinary Collaboration:  · Physical Therapy Assistant and Registered Nurse  · After treatment position/precautions:  · Bed alarm/tab alert on, Bed/Chair-wheels locked, Bed in low position, Call light within reach, RN notified, Family at bedside and seated EOB  · Compliance with Program/Exercises: compliant most of the time. · Recommendations/Intent for next treatment session: \"Next visit will focus on advancements to more challenging activities and reduction in assistance provided\".   Total Treatment Duration:PT Patient Time In/Time Out  Time In: 1026  Time Out: 9[facility]

## 2017-07-05 NOTE — PROGRESS NOTES
Patient in bed resting with no complaints at this time. Patient is alert and orientated with no distress noted. IV intact and patent with no s/s of infection noted. Respirations even and unlabored with heart rate regular. Patient unable to ambulate independently without assistance; needs x1 r/t weakness and dyspnea. Bed in low locked position with call light within reach. Patient instructed to call if assistance is needed. Will continue to monitor.

## 2017-07-05 NOTE — PROGRESS NOTES
LTG: Patient will tolerate least restrictive diet without overt signs or symptoms of airway compromise. STG: Patient will tolerate regular diet and thin liquids without overt signs or symptoms of airway compromise. (goal updated 7/5/17)  STG: Patient will participate in modified barium swallow study as clinically indicated. (goal met 7/5/17)    Speech language pathology: modified barium swallow study: Initial Assessment and Discharge    NAME/AGE/GENDER: Sadi Kyle is a 77 y.o. male  DATE: 7/5/2017  PRIMARY DIAGNOSIS: CAP (community acquired pneumonia)       ICD-10: Treatment Diagnosis: dysphagia oropharyngeal R13.12  INTERDISCIPLINARY COLLABORATION: radiologist  PRECAUTIONS/ALLERGIES: Mylanta [aluminum-magnesium hydroxide] ASSESSMENT/PLAN OF CARE:Based on the objective data described below, Mr. Popeye Méndez presents with shallow, transient laryngeal penetration only with thin liquids by straw. No penetration or aspiration with thin liquids via spoon or cup. Brief premature spillage to the pyriform sinuses with liquid trials and mixed consistencies. Trace pharyngeal residual only after the swallow with liquids. Swallow initiated at the valleculae with pudding with mild residue within the valleculae which clears with an additional swallow. Trace to mild residue with mixed and solids. Mastication time grossly within functional limits. Recommend cardiac diet/thin liquids. Reviewed safe swallowing strategies with patient after the study with understanding expressed. No further ST indicated at this time. ?????? ? ? This section established at most recent assessment??????????  RECOMMENDATIONS AND PLANNED INTERVENTIONS (Benefits and precautions of therapy have been discussed with the patient.):  · PO:  Regular  · Liquids:  regular thin  MEDICATIONS:  · one at a time with a liquid wash  COMPENSATORY STRATEGIES/MODIFICATIONS INCLUDING:  · Small sips and bites  OTHER RECOMMENDATIONS (including follow up treatment recommendations):   · Patient education  FREQUENCY/DURATION: Will not continue to follow patient to address above goals. RECOMMENDED REHABILITATION/EQUIPMENT: (at time of discharge pending progress):   None. SUBJECTIVE:   Cooperative. History of Present Injury/Illness: Mr. Olivia Doyle  has a past medical history of Asthma; CAD (coronary artery disease); COPD; Diabetes ([facility] [address] Endocrine disease; Gastrointestinal disorder; Heart failure ([facility] Hypertension; Other ill-defined conditions; and PUD (peptic ulcer disease). He also has no past medical history of Arthritis; Autoimmune disease ([facility] [address] Cancer ([facility] [address] Chronic kidney disease; DEMENTIA; Infectious disease; Liver disease; Neurological disorder; Psychiatric disorder; Seizures ([facility] [address] Sleep disorder; Stroke [facility]); or Thromboembolus ([facility]  He also  has a past surgical history that includes cardiac surg procedure unlist.  Present Symptoms: pneumonia  Pain Intensity 1: 0  Pain Location 1: Back  Pain Intervention(s) 1: Medication (see MAR)    Current Dietary Status:  cardiac [facility] soft/nectar   Radiologist: Dr. Zaida Barron    Social History/Home Situation: home alone  Home Environment: Private residence  # Steps to Enter: 3  One/Two Story Residence: One story  Living Alone: Yes  Support Systems: Child(charles)  Patient Expects to be Discharged to[de-identified] Private residence  Current DME Used/Available at Home: Cane, straight, Shower chair  OBJECTIVE:     Cognitive/Communication Status:  Mental Status  Neurologic State: Alert  Orientation Level: Oriented X4  Cognition: Appropriate for age attention/concentration, Unable to assess (comment)  Perception: Appears intact  Perseveration: No perseveration noted    Oral Assessment:  Oral Assessment  Labial: No impairment  Dentition: Intact, Natural  Lingual: No impairment    Vocal Quality: WFL    Patient Viewed: Patient Position: upright in chair  Film Views: Lateral, Fluoro    Oral Prepatory:  The patient was given the following: Consistency Presented:  Thin liquid, Mixed consistency, Solid, Pudding  How Presented: Self-fed/presented, Cup/sip, Spoon, Straw, Successive swallows    Oral Phase:  Bolus Acceptance: No impairment  Bolus Formation/Control: No impairment  Propulsion: No impairment     Oral Residue: None  Initiation of Swallow: Triggered at vallecula, Triggered at pyriform sinus(es)  Oral Phase Severity: No impairment    Pharyngeal Phase:  Timing: Pooling 1-5 sec     Laryngeal Elevation: Incomplete laryngeal closure  Penetration: Flash/transient, During swallow, To laryngeal vestibule  Aspiration/Timing: No evidence of aspiration  Aspiration/Penetration Score: 2 (Penetration/No residue-Contrast enters the airway penetrates, remains above the folds/cords, and is cleared)  Pharyngeal Symmetry: Not assessed  Pharyngeal Dysfunction: Decreased elevation/closure  Pharyngeal Phase Severity: Minimal  Pharyngeal-Esophageal Segment: No impairment    Assessment/Reassessment only, no treatment provided today    Tool Used: Dysphagia Outcome and Severity Scale (IKER)    Score Comments   Normal Diet  [] 7 With no strategies or extra time needed       Functional Swallow  [x] 6 May have mild oral or pharyngeal delay       Mild Dysphagia    [] 5 Which may require one diet consistency restricted (those who demonstrate penetration which is entirely cleared on MBS would be included)   Mild-Moderate Dysphagia  [] 4 With 1-2 diet consistencies restricted       Moderate Dysphagia  [] 3 With 2 or more diet consistencies restricted       Moderately Severe Dysphagia  [] 2 With partial PO strategies (trials with ST only)       Severe Dysphagia  [] 1 With inability to tolerate any PO safely          Score:  Initial: 5 Most Recent: X (Date: -- )   Interpretation of Tool: The Dysphagia Outcome and Severity Scale (IKER) is a simple, easy-to-use, 7-point scale developed to systematically rate the functional severity of dysphagia based on objective assessment and make recommendations for diet level, independence level, and type of nutrition. Score 7 6 5 4 3 2 1   Modifier CH CI CJ CK CL CM CN   ?  Swallowing:     - CURRENT STATUS: CI - 1%-19% impaired, limited or restricted    - GOAL STATUS:  CI - 1%-19% impaired, limited or restricted    - D/C STATUS:  CI - 1%-19% impaired, limited or restricted  Payor: Rabia uLu / Plan: Shira Anderson PPO/PFFS / Product Type: Managed Care Medicare /   __________________________________________________________________________________________________  Safety:   After treatment position/precautions:  · RN notified  · Upright in Bed  Recommendations for treatment: safe swallowing strategies  Total Treatment Duration:  Time In: 0900   Time Out: [address] [facility] MS, CCC-SLP

## 2017-07-05 NOTE — PROGRESS NOTES
Patient resting in bed with no complaints at this time. Patient has family at bedside earlier but is now relaxing in bed with no s/s of discomfort or pain. Call light within reach.   Report to be given to oncoming RN 7p-7a

## 2017-07-05 NOTE — ADT AUTH CERT NOTES
Utilization Review           Pneumonia, Community Acquired - Care Day 4 (7/4/2017) by Sierra Lewis RN        Review Status Review Entered       Completed 7/5/2017       Details              Care Day: 4 Care Date: 7/4/2017 Level of Care: Inpatient Floor       Guideline Day 2        Level Of Care       (X) Floor              Clinical Status       (X) * No CO2 retention or acidosis       (X) * No requirement for mechanical ventilation       (X) * Hypotension absent       (X) * Fever absent or reduced       7/5/2017 10:06 AM EDT by CivilGEO         Afebrile now. TEMP UP .4 ON 7/2 AND 7/3              (X) * No hypoxia on room air or oxygenation improved       (X) * Mental status improved or at baseline              Activity       (X) * Increased activity              Routes       (X) Oral hydration, medications       (X) Usual diet       7/5/2017 10:06 AM EDT by CivilGEO         CARDIAC DIET                     Interventions       (X) Possible oxygen       7/5/2017 10:06 AM EDT by Intermolecularri MarginLeft         2L NC                     Medications       (X) IV or oral antibiotics       7/5/2017 10:06 AM EDT by CivilGEO         LEVAQUIN 750 MG QD IV                                          * Milestone              Additional Notes       Currently on RA with o2 sat 90s.                 Subjective:               PCT elevated, on levaquin.       Afebrile now. TEMP UP .4 ON 7/2 AND 7/3       ambulat ing with walker needing 4 lpm, previously not on O2.                + productive cough               -- CAP:  Levaquin D 4, now afebrile. Repeat PCT tomorrow       -- mobilize, worked with PT and no need for rehab but will work with him here.       -- needing 4 lpm with ambulation and 2 lpm at rest.        -- ask speech to see R/O aspiration       -- repeat CXR in am       Lungs: few trace rhonchi       Heart:  RRR with no Murmur/Rubs/Gallops               Additional Comments: Slow improvement. Trying to wean oxygen. MBS tomorrow along with CXR and PCT.              /70, TEMP 97.5, HR 72, RR 17, O2 94 ON 2L NC       LABS 7/3: U/A: PROT 100, BL SM; PROCAL 2.0, RESP/BL CULTURES PENDING       NO IMAGING       ST EVAL, RT, CARDIAC DIET, AMB W/ ASSIST,           ADDITIONAL CLINICAL FOR 7/1/17 by Sabas Aguilera RN        Review Status Review Entered       In Primary 7/5/2017       Details         ER NOTE  HPI Comments: patient     Patient is a 77 y.o. male presenting with shortness of breath and syncope. The history is provided by the patient. Shortness of Breath   This is a new problem. The average episode lasts 3 days. The problem occurs continuously. The problem has been gradually worsening. Associated symptoms include a fever, rhinorrhea, cough, sputum production and syncope. Pertinent negatives include no headaches, no sore throat, no chest pain, no vomiting, no abdominal pain, no leg pain and no leg swelling. He has tried nothing for the symptoms. He has had no prior hospitalizations. Prior ED visits: urgent care yesterday with negative x-ray and started on Z-Rocco. Associated medical issues include COPD. Syncope    This is a new problem. The current episode started yesterday. The problem occurs daily. The problem has not changed since onset. There was no loss of consciousness. The problem is associated with standing up. Associated symptoms include a fever. Pertinent negatives include no chest pain, no abdominal pain, no nausea, no vomiting, no headaches, no back pain and no weakness. Treatments tried: antibiotics for bronchitis.  The treatment provided no relief.    Number of Diagnoses or Management Options  Diagnosis management comments: Patient is orthostatic.  Chest x-ray reveals right sided infiltrate.  Oxygen saturations are 91 and 92% on room air while at rest.  Patient is cultured and lactic acid was found to be normal.  No signs of sepsis.  IV Levaquin ordered.  IV hydration for orthostatics ordered. Kartik Urban have asked the pulmonary service to admit for observation for community acquired pneumonia and orthostatic hypotension.     BP DROPPED TO 78/44 STANDING  LA 1.3, PLAT 115, GLUC 185, PROCAL 6.9, TROP 0.02, , BLOOD CULTURES  CXR: Probable acute multifocal pneumonia.    EKG: Normal sinus rhythm   Left anterior fascicular block   T wave abnormality, consider anterior ischemia   Abnormal ECG  PT CONSULT, I/O, AMB W/ ASSIST, ELEV HEAD OF BED, CARDIAC DIET  PROVENTIL NEB Q6H, NS DRIP @ 125, NS 1000 ML IV X 1, DUONEB X 1, LEVAQUIN 750 MG IV X 2 THEN QD IV

## 2017-07-05 NOTE — PROGRESS NOTES
Patient voices no complaints at this time. Patient with family at bedside. Call light within reach. Will continue to monitor.

## 2017-07-05 NOTE — PROGRESS NOTES
Alert with unlabored respirations. Denies needs or discomfort. Head of bed elevated.  Family at bedside

## 2017-07-05 NOTE — PROGRESS NOTES
Ramon Novoa  Admission Date: 7/1/2017             Daily Progress Note: 7/5/2017   Patient is a 77 y.o.  male presented with falls. Gaye Ramírez has a history of HTN, HL, CAD s/p CABG 1998, re-do CABG 2003, PCI 12/2014, DM, and COPD.  He quit smoking 1998 with approximate 60 pack year history, is maintained on Singulair and albuterol, and is not followed by a pulmonologist. John Sousa provides minimal history and friend at bedside assists.  Apparently he was brought to the ER today because of multiple falls this am - his daughter was aware of 2-3 and he states that he fell 4-5 times that she did not witness. John Sousa is not able to describe what happens when he falls and thinks that he may loose consciousness, +/- palpitations.  His friend states that he was febrile yesterday and was taken to Urgent Care where CXR was negative and he was started on Z-pack for bronchitis.  Patient denies any recent increase in cough, mucus production,increase in swelling or chest pain.  He does report occasional palpitations.  Patient also reports that he is having frequent loose stools but no nausea/vomiting.  His BP was initially low with SBP in the 90s.  He received 250 ml fluid bolus with improvement of BP to most recent reading of 144/66.  Currently on RA with o2 sat 90s.       Subjective:     Currently on 2lpm O2 at rest.  Requests shower    Review of Systems  Constitutional: positive for malaise  Respiratory: positive for cough, sputum or dyspnea on exertion  Cardiovascular: negative for chest pain, chest pressure/discomfort    Current Facility-Administered Medications   Medication Dose Route Frequency    methyl salicylate-menthol (BENGAY) 15-10 % cream   Topical TID PRN    levoFLOXacin (LEVAQUIN) 750 mg in D5W IVPB  750 mg IntraVENous Q24H    albuterol (PROVENTIL HFA, VENTOLIN HFA, PROAIR HFA) inhaler 2 Puff  2 Puff Inhalation Q6H RT    ALPRAZolam (XANAX) tablet 0.25 mg  0.25 mg Oral BID PRN    aspirin chewable tablet 81 mg  81 mg Oral DAILY    atorvastatin (LIPITOR) tablet 80 mg  80 mg Oral QHS    carvedilol (COREG) tablet 25 mg  25 mg Oral BID WITH MEALS    ezetimibe (ZETIA) tablet 10 mg  10 mg Oral DAILY    glimepiride (AMARYL) tablet 2 mg  2 mg Oral 7am    guaiFENesin ER (MUCINEX) tablet 600 mg  600 mg Oral BID    levothyroxine (SYNTHROID) tablet 75 mcg  75 mcg Oral ACB    losartan (COZAAR) tablet 50 mg  50 mg Oral DAILY    montelukast (SINGULAIR) tablet 10 mg  10 mg Oral DAILY    nitroglycerin (NITROSTAT) tablet 0.4 mg  0.4 mg SubLINGual PRN    potassium chloride (K-DUR, KLOR-CON) SR tablet 20 mEq  20 mEq Oral DAILY    ticagrelor (BRILINTA) tablet 90 mg  90 mg Oral Q12H    zolpidem (AMBIEN) tablet 10 mg  10 mg Oral QHS PRN    sodium chloride (NS) flush 5-10 mL  5-10 mL IntraVENous Q8H    sodium chloride (NS) flush 5-10 mL  5-10 mL IntraVENous PRN    acetaminophen (TYLENOL) tablet 650 mg  650 mg Oral Q4H PRN    naloxone (NARCAN) injection 0.4 mg  0.4 mg IntraVENous PRN    enoxaparin (LOVENOX) injection 40 mg  40 mg SubCUTAneous Q24H    furosemide (LASIX) tablet 40 mg  40 mg Oral DAILY         Objective:     Vitals:    07/05/17 0000 07/05/17 0241 07/05/17 0402 07/05/17 0554   BP: 129/62  134/76    Pulse: 82  84    Resp: 18  18    Temp: 99 °F (37.2 °C)  98.4 °F (36.9 °C)    SpO2: 96% 97% 97%    Weight:    198 lb 6.4 oz (90 kg)   Height:         Intake and Output:   07/03 1901 - 07/05 0700  In: 600 [P.O.:600]  Out: 1400 [Urine:1400]       Physical Exam:          Constitutional: the patient is weak  HEENT: Sclera clear, pupils equal, oral mucosa moist  Lungs: clear anteriorly on 4 lpm ambulating, + productive cough  Cardiovascular: RRR without M,G,R  Abd/GI: soft and non-tender; with positive bowel sounds. Ext: warm without cyanosis. There is no lower leg edema.   Musculoskeletal: moves all four extremities with equal strength  Skin: no jaundice or rashes, no wounds   Neuro: no gross neuro deficits Lines/Drains: IV  Nutrition: ADA    CHEST XRAY:       7/5    LAB  No results for input(s): WBC, HGB, HCT, PLT, HGBEXT, HCTEXT, PLTEXT, HGBEXT, HCTEXT, PLTEXT in the last 72 hours. No results for input(s): NA, K, CL, CO2, GLU, BUN, CREA, MG, PHOS, TROIQ, INR, BNPP in the last 72 hours. No lab exists for component: TROIP, INREXT, INREXT  Sputum  GRAM STAIN 0 TO 3   EPITHELIAL CELLS SEEN   /OIF     Final   GRAM STAIN MODERATE   GRAM POSITIVE COCCI      Final   GRAM STAIN MODERATE   GRAM NEGATIVE RODS      Final   GRAM STAIN FEW   GRAM POSITIVE RODS      Final   GRAM STAIN 3+ MUCPR  Final   Culture result: LIGHT   NORMAL RESPIRATORY MIRANDA               Assessment & Plan:     Hospital Problems  Never Reviewed          Codes Class Noted POA    Diabetes mellitus ([facility] [address] (Chronic) ICD-10-CM: E11.9  ICD-9-CM: 250.00  7/1/2017 Yes       Ref.  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

## 2017-07-06 NOTE — DISCHARGE SUMMARY
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 Please see speech pathologist report  for further details.     CXR 7/5/17: improved pneumonia    CXR 7/1/17: right upper lobe      Condition on Discharge:  Stable home    Disposition:  To home    Presenting Illness:   Ricardo Renee is a 78yoM who was admitted after he fell several times at home. He was found to have community acquired pneumonia and required oxygen support in the hospital. His room air sat on the day of discharge was 91% with exertion so he will not need home oxygen. His follow CXR shows improvement. He completed 6/7 days of levaquin here and was prescribed one day of cefdinir 300mg bid because levaquin was a nonformulary prescription which was expensive. He was assisted by physical therapy while here. He was also seen by speech therapy and he passed a swallow study with no evidence of aspiration. He can follow up with us as needed but otherwise will follow up with his PCP.      Of note, his BNP was found to be >900 and diuretics were continued, which seemed to expedite the weaning of O2.  He will return to his home diuretic regimen at discharge as he appears to be euvolemic. Discharge Medications:   Current Discharge Medication List      START taking these medications    Details   cefdinir (OMNICEF) 300 mg capsule Take 1 Cap by mouth two (2) times a day. Qty: 2 Cap, Refills: 0     CONTINUE these medications which have NOT CHANGED     potassium chloride (K-DUR, KLOR-CON) 20 mEq tablet Take 1 Tab by mouth daily. Qty: 1 Tab, Refills: 0      atorvastatin (LIPITOR) 80 mg tablet Take 1 Tab by mouth nightly. Qty: 1 Tab, Refills: 0      ezetimibe (ZETIA) 10 mg tablet Take 1 Tab by mouth daily. Qty: 1 Tab, Refills: 0      zolpidem (AMBIEN) 10 mg tablet Take 1 Tab by mouth nightly as needed for Sleep. Max Daily Amount: 10 mg.  Qty: 1 Tab, Refills: 0      clopidogrel (PLAVIX) 75 mg tab Take 75 mg by mouth daily. rosuvastatin (CRESTOR) 20 mg tablet Take 20 mg by mouth nightly. carvedilol (COREG) 25 mg tablet Take 1 tablet by mouth two (2) times daily (with meals). Qty: 60 tablet, Refills: 6      aspirin 81 mg chewable tablet Take 1 tablet by mouth daily. losartan (COZAAR) 50 mg tablet Take 1 tablet by mouth daily. Qty: 30 tablet, Refills: 6      guaiFENesin ER (MUCINEX) 600 mg ER tablet Take 600 mg by mouth two (2) times a day. montelukast (SINGULAIR) 10 mg tablet Take 10 mg by mouth daily. nitroglycerin (NITROSTAT) 0.4 mg SL tablet by SubLINGual route every five (5) minutes as needed for Chest Pain. albuterol (VENTOLIN HFA) 90 mcg/actuation inhaler Take  by inhalation as needed for Wheezing. ALPRAZolam (XANAX) 0.25 mg tablet Take 0.25 mg by mouth two (2) times daily as needed for Anxiety. furosemide (LASIX) 40 mg tablet Take 40 mg by mouth two (2) times a day. 20 mg in am, 40 mg hs  Indications: takes 1/2 pill on saturday and sunday, then 40 mg rest of the week. glimepiride (AMARYL) 2 mg tablet Take 2 mg by mouth every morning.         tiotropium (SPIRIVA WITH HANDIHALER) 18 mcg inhalation capsule Take 1 Cap by inhalation daily. levothyroxine (SYNTHROID) 75 mcg tablet Take 75 mcg by mouth Daily (before breakfast). STOP taking these medications       amoxicillin-clavulanate (AUGMENTIN) 875-125 mg per tablet Comments:   Reason for Stopping: now on omnicef        azithromycin (ZITHROMAX Z-CARLOS MANUEL) 250 mg tablet Comments:   Reason for Stopping: now on omnicef              Condition on Discharge:  stable      Followup/Outpt Studies:  --Follow up appointment with PCP as needed  --Total discharge greater than 30 minutes in duration. More than 50% of the time documented was spent in face-to-face contact with the patient and in the care of the patient on the floor/unit where the patient is located. Dread Morris NP     I have spoken with and examined the patient. I agree with the above assessment and plan as documented. Mary Fraga has improved and is doing well with PT on room air. He will need follow-up with primary care within the next month.     Gen: pleasant, NAD on RA  Lungs: CTAB  Heart:  RRR with no Murmur/Rubs/Gallops  Ext: no edema      Yovani Rodriguez MD

## 2017-07-06 NOTE — PROGRESS NOTES
Patient resting in bed with eyes closed, O2 via n/c in place, RR even and unlabored, will continue to monitor.

## 2017-07-06 NOTE — PROGRESS NOTES
Oxygen Qualifier       Room air: SpO2 with O2 and liter flow   Resting SpO2  95%  NA   Ambulating SpO2  91% NA         Completed by:    Philip Braxton, RT    Philip Braxton, RT

## 2017-07-06 NOTE — PROGRESS NOTES
Patient resting in bed with eyes closed, O2 via n/c in place, RR even and unlabored, will continue to monitor, call light within reach.

## 2017-07-06 NOTE — PROGRESS NOTES
Patient is alert and oriented X3, sitting up in bed in room, O2 via n/c in place, no c/o SOB @ this time, IV intact, no c/o pain, will continue to monitor.

## 2017-07-06 NOTE — DISCHARGE INSTRUCTIONS
DISCHARGE SUMMARY from Nurse    The following personal items are in your possession at time of discharge:    Dental Appliances: None  Visual Aid: Glasses     Home Medications: None  Jewelry: None  Clothing: None  Other Valuables: None  Personal Items Sent to Safe: none          PATIENT INSTRUCTIONS:    After general anesthesia or intravenous sedation, for 24 hours or while taking prescription Narcotics:  · Limit your activities  · Do not drive and operate hazardous machinery  · Do not make important personal or business decisions  · Do  not drink alcoholic beverages  · If you have not urinated within 8 hours after discharge, please contact your surgeon on call. Report the following to your surgeon:  · Excessive pain, swelling, redness or odor of or around the surgical area  · Temperature over 100.5  · Nausea and vomiting lasting longer than 4 hours or if unable to take medications  · Any signs of decreased circulation or nerve impairment to extremity: change in color, persistent  numbness, tingling, coldness or increase pain  · Any questions        What to do at Home:  Recommended activity: Activity as tolerated. If you experience any of the following symptoms temp > 101.54, worsening cough or wheezing, shortness of breath or fatigue not relieved with rest, please follow up with MD.      *  Please give a list of your current medications to your Primary Care Provider. *  Please update this list whenever your medications are discontinued, doses are      changed, or new medications (including over-the-counter products) are added. *  Please carry medication information at all times in case of emergency situations. These are general instructions for a healthy lifestyle:    No smoking/ No tobacco products/ Avoid exposure to second hand smoke    Surgeon General's Warning:  Quitting smoking now greatly reduces serious risk to your health.     Obesity, smoking, and sedentary lifestyle greatly increases your risk for illness    A healthy diet, regular physical exercise & weight monitoring are important for maintaining a healthy lifestyle    You may be retaining fluid if you have a history of heart failure or if you experience any of the following symptoms:  Weight gain of 3 pounds or more overnight or 5 pounds in a week, increased swelling in our hands or feet or shortness of breath while lying flat in bed. Please call your doctor as soon as you notice any of these symptoms; do not wait until your next office visit. Recognize signs and symptoms of STROKE:    F-face looks uneven    A-arms unable to move or move unevenly    S-speech slurred or non-existent    T-time-call 911 as soon as signs and symptoms begin-DO NOT go       Back to bed or wait to see if you get better-TIME IS BRAIN. Warning Signs of HEART ATTACK     Call 911 if you have these symptoms:   Chest discomfort. Most heart attacks involve discomfort in the center of the chest that lasts more than a few minutes, or that goes away and comes back. It can feel like uncomfortable pressure, squeezing, fullness, or pain.  Discomfort in other areas of the upper body. Symptoms can include pain or discomfort in one or both arms, the back, neck, jaw, or stomach.  Shortness of breath with or without chest discomfort.  Other signs may include breaking out in a cold sweat, nausea, or lightheadedness. Don't wait more than five minutes to call 911 - MINUTES MATTER! Fast action can save your life. Calling 911 is almost always the fastest way to get lifesaving treatment. Emergency Medical Services staff can begin treatment when they arrive -- up to an hour sooner than if someone gets to the hospital by car. The discharge information has been reviewed with the patient. The patient verbalized understanding. Discharge medications reviewed with the patient and appropriate educational materials and side effects teaching were provided. Chronic Obstructive Pulmonary Disease (COPD): Care Instructions  Your Care Instructions    Chronic obstructive pulmonary disease (COPD) is a general term for a group of lung diseases, including emphysema and chronic bronchitis. People with COPD have decreased airflow in and out of the lungs, which makes it hard to breathe. The airways also can get clogged with thick mucus. Cigarette smoking is a major cause of COPD. Although there is no cure for COPD, you can slow its progress. Following your treatment plan and taking care of yourself can help you feel better and live longer. Follow-up care is a key part of your treatment and safety. Be sure to make and go to all appointments, and call your doctor if you are having problems. It's also a good idea to know your test results and keep a list of the medicines you take. How can you care for yourself at home? Staying healthy  · Do not smoke. This is the most important step you can take to prevent more damage to your lungs. If you need help quitting, talk to your doctor about stop-smoking programs and medicines. These can increase your chances of quitting for good. · Avoid colds and flu. Get a pneumococcal vaccine shot. If you have had one before, ask your doctor whether you need a second dose. Get the flu vaccine every fall. If you must be around people with colds or the flu, wash your hands often. · Avoid secondhand smoke, air pollution, and high altitudes. Also avoid cold, dry air and hot, humid air. Stay at home with your windows closed when air pollution is bad. Medicines and oxygen therapy  · Take your medicines exactly as prescribed. Call your doctor if you think you are having a problem with your medicine. · You may be taking medicines such as:  ¨ Bronchodilators. These help open your airways and make breathing easier. Bronchodilators are either short-acting (work for 6 to 9 hours) or long-acting (work for 24 hours).  You inhale most bronchodilators, so they start to act quickly. Always carry your quick-relief inhaler with you in case you need it while you are away from home. ¨ Corticosteroids (prednisone, budesonide). These reduce airway inflammation. They come in pill or inhaled form. You must take these medicines every day for them to work well. · A spacer may help you get more inhaled medicine to your lungs. Ask your doctor or pharmacist if a spacer is right for you. If it is, ask how to use it properly. · Do not take any vitamins, over-the-counter medicine, or herbal products without talking to your doctor first.  · If your doctor prescribed antibiotics, take them as directed. Do not stop taking them just because you feel better. You need to take the full course of antibiotics. · Oxygen therapy boosts the amount of oxygen in your blood and helps you breathe easier. Use the flow rate your doctor has recommended, and do not change it without talking to your doctor first.  Activity  · Get regular exercise. Walking is an easy way to get exercise. Start out slowly, and walk a little more each day. · Pay attention to your breathing. You are exercising too hard if you cannot talk while you are exercising. · Take short rest breaks when doing household chores and other activities. · Learn breathing methods--such as breathing through pursed lips--to help you become less short of breath. · If your doctor has not set you up with a pulmonary rehabilitation program, talk to him or her about whether rehab is right for you. Rehab includes exercise programs, education about your disease and how to manage it, help with diet and other changes, and emotional support. Diet  · Eat regular, healthy meals. Use bronchodilators about 1 hour before you eat to make it easier to eat. Eat several small meals instead of three large ones. Drink beverages at the end of the meal. Avoid foods that are hard to chew.   · Eat foods that contain protein so that you do not lose muscle mass.  · Talk with your doctor if you gain too much weight or if you lose weight without trying. Mental health  · Talk to your family, friends, or a therapist about your feelings. It is normal to feel frightened, angry, hopeless, helpless, and even guilty. Talking openly about bad feelings can help you cope. If these feelings last, talk to your doctor. When should you call for help? Call 911 anytime you think you may need emergency care. For example, call if:  · You have severe trouble breathing. Call your doctor now or seek immediate medical care if:  · You have new or worse trouble breathing. · You cough up blood. · You have a fever. Watch closely for changes in your health, and be sure to contact your doctor if:  · You cough more deeply or more often, especially if you notice more mucus or a change in the color of your mucus. · You have new or worse swelling in your legs or belly. · You are not getting better as expected. Where can you learn more? Go to http://liz-jovanna.info/. Zohra Eduardo in the search box to learn more about \"Chronic Obstructive Pulmonary Disease (COPD): Care Instructions. \"  Current as of: March 25, 2017  Content Version: 11.3  © 8066-3036 [employer]. Care instructions adapted under license by [employer] (which disclaims liability or warranty for this information). If you have questions about a medical condition or this instruction, always ask your healthcare professional. [place] [address] any warranty or liability for your use of this information. Pneumonia: Care Instructions  Your Care Instructions    Pneumonia is an infection of the lungs. Most cases are caused by infections from bacteria or viruses. Pneumonia may be mild or very severe. If it is caused by bacteria, you will be treated with antibiotics.  It may take a few weeks to a few months to recover fully from pneumonia, depending on how sick you were and whether your overall health is good. Follow-up care is a key part of your treatment and safety. Be sure to make and go to all appointments, and call your doctor if you are having problems. Its also a good idea to know your test results and keep a list of the medicines you take. How can you care for yourself at home? · Take your antibiotics exactly as directed. Do not stop taking the medicine just because you are feeling better. You need to take the full course of antibiotics. · Take your medicines exactly as prescribed. Call your doctor if you think you are having a problem with your medicine. · Get plenty of rest and sleep. You may feel weak and tired for a while, but your energy level will improve with time. · To prevent dehydration, drink plenty of fluids, enough so that your urine is light yellow or clear like water. Choose water and other caffeine-free clear liquids until you feel better. If you have kidney, heart, or liver disease and have to limit fluids, talk with your doctor before you increase the amount of fluids you drink. · Take care of your cough so you can rest. A cough that brings up mucus from your lungs is common with pneumonia. It is one way your body gets rid of the infection. But if coughing keeps you from resting or causes severe fatigue and chest-wall pain, talk to your doctor. He or she may suggest that you take a medicine to reduce the cough. · Use a vaporizer or humidifier to add moisture to your bedroom. Follow the directions for cleaning the machine. · Do not smoke or allow others to smoke around you. Smoke will make your cough last longer. If you need help quitting, talk to your doctor about stop-smoking programs and medicines. These can increase your chances of quitting for good. · Take an over-the-counter pain medicine, such as acetaminophen (Tylenol), ibuprofen (Advil, Motrin), or naproxen (Aleve). Read and follow all instructions on the label.   · Do not take two or more pain medicines at the same time unless the doctor told you to. Many pain medicines have acetaminophen, which is Tylenol. Too much acetaminophen (Tylenol) can be harmful. · If you were given a spirometer to measure how well your lungs are working, use it as instructed. This can help your doctor tell how your recovery is going. · To prevent pneumonia in the future, talk to your doctor about getting a flu vaccine (once a year) and a pneumococcal vaccine (one time only for most people). When should you call for help? Call 911 anytime you think you may need emergency care. For example, call if:  · You have severe trouble breathing. Call your doctor now or seek immediate medical care if:  · You cough up dark brown or bloody mucus (sputum). · You have new or worse trouble breathing. · You are dizzy or lightheaded, or you feel like you may faint. Watch closely for changes in your health, and be sure to contact your doctor if:  · You have a new or higher fever. · You are coughing more deeply or more often. · You are not getting better after 2 days (48 hours). · You do not get better as expected. Where can you learn more? Go to http://liz-jovanna.info/. Enter [id number] in the search box to learn more about \"Pneumonia: Care Instructions. \"  Current as of: March 25, 2017  Content Version: 11.3  © 3162-4786 [employer]. Care instructions adapted under license by [employer] (which disclaims liability or warranty for this information). If you have questions about a medical condition or this instruction, always ask your healthcare professional. [address] any warranty or liability for your use of this information.

## 2017-07-06 NOTE — PROGRESS NOTES
Discharge instructions, follow up information, medication list, prescription(antibiotic - all other medications were prior to admission and patient has meds at home), and medication side effects sheet provided and explained to the pt. IV removed by primary RN. Opportunity for questions provided. Patient Patient states ride will be here in about an hour. Instructed to call once ready to leave.

## 2017-07-06 NOTE — PROGRESS NOTES
Pt in bed A & O x4. Pt has no c/o of pain, discomfort, or SOB at this time. Pt receiving O2 via NC @ 2L/min. Pt respirations clear and unlabored. Pt abdomen soft, nontender. Pt states LBM was yesterday 7/5/17. Pt R IV intact. Call light within reach, safety measures in place, will continue to monitor.

## 2017-08-18 NOTE — PROGRESS NOTES
Pt called and was notified that the CXR shows that his pneumonia has resolved. He is to f/u as discussed previously.

## 2018-01-01 ENCOUNTER — HOME CARE VISIT (OUTPATIENT)
Dept: SCHEDULING | Facility: HOME HEALTH | Age: 78
End: 2018-01-01
Payer: MEDICARE

## 2018-01-01 ENCOUNTER — HOME CARE VISIT (OUTPATIENT)
Dept: HOME HEALTH SERVICES | Facility: HOME HEALTH | Age: 78
End: 2018-01-01
Payer: MEDICARE

## 2018-01-01 ENCOUNTER — TELEPHONE (OUTPATIENT)
Dept: HOME HEALTH SERVICES | Facility: HOME HEALTH | Age: 78
End: 2018-01-01

## 2018-01-01 ENCOUNTER — HOME HEALTH ADMISSION (OUTPATIENT)
Dept: HOME HEALTH SERVICES | Facility: HOME HEALTH | Age: 78
End: 2018-01-01
Payer: MEDICARE

## 2018-01-01 ENCOUNTER — APPOINTMENT (OUTPATIENT)
Dept: GENERAL RADIOLOGY | Age: 78
DRG: 226 | End: 2018-01-01
Attending: EMERGENCY MEDICINE
Payer: MEDICARE

## 2018-01-01 ENCOUNTER — APPOINTMENT (OUTPATIENT)
Dept: ULTRASOUND IMAGING | Age: 78
DRG: 226 | End: 2018-01-01
Attending: INTERNAL MEDICINE
Payer: MEDICARE

## 2018-01-01 ENCOUNTER — HOSPITAL ENCOUNTER (INPATIENT)
Age: 78
LOS: 7 days | Discharge: HOME HEALTH | DRG: 226 | End: 2018-03-08
Attending: EMERGENCY MEDICINE | Admitting: INTERNAL MEDICINE
Payer: MEDICARE

## 2018-01-01 ENCOUNTER — PATIENT OUTREACH (OUTPATIENT)
Dept: CASE MANAGEMENT | Age: 78
End: 2018-01-01

## 2018-01-01 ENCOUNTER — ANESTHESIA EVENT (OUTPATIENT)
Dept: SURGERY | Age: 78
DRG: 226 | End: 2018-01-01
Payer: MEDICARE

## 2018-01-01 ENCOUNTER — ANESTHESIA (OUTPATIENT)
Dept: SURGERY | Age: 78
DRG: 226 | End: 2018-01-01
Payer: MEDICARE

## 2018-01-01 ENCOUNTER — APPOINTMENT (OUTPATIENT)
Dept: GENERAL RADIOLOGY | Age: 78
DRG: 226 | End: 2018-01-01
Attending: INTERNAL MEDICINE
Payer: MEDICARE

## 2018-01-01 VITALS
DIASTOLIC BLOOD PRESSURE: 61 MMHG | RESPIRATION RATE: 19 BRPM | OXYGEN SATURATION: 95 % | TEMPERATURE: 98 F | HEART RATE: 75 BPM | WEIGHT: 186.3 LBS | HEIGHT: 75 IN | BODY MASS INDEX: 23.16 KG/M2 | SYSTOLIC BLOOD PRESSURE: 96 MMHG

## 2018-01-01 VITALS — HEART RATE: 62 BPM | RESPIRATION RATE: 16 BRPM | DIASTOLIC BLOOD PRESSURE: 64 MMHG | SYSTOLIC BLOOD PRESSURE: 96 MMHG

## 2018-01-01 VITALS
RESPIRATION RATE: 16 BRPM | HEART RATE: 58 BPM | OXYGEN SATURATION: 94 % | SYSTOLIC BLOOD PRESSURE: 110 MMHG | DIASTOLIC BLOOD PRESSURE: 70 MMHG | TEMPERATURE: 96.2 F

## 2018-01-01 VITALS
DIASTOLIC BLOOD PRESSURE: 80 MMHG | HEART RATE: 75 BPM | SYSTOLIC BLOOD PRESSURE: 100 MMHG | RESPIRATION RATE: 20 BRPM | TEMPERATURE: 98.1 F

## 2018-01-01 VITALS
SYSTOLIC BLOOD PRESSURE: 102 MMHG | HEART RATE: 62 BPM | DIASTOLIC BLOOD PRESSURE: 75 MMHG | TEMPERATURE: 97.3 F | RESPIRATION RATE: 18 BRPM

## 2018-01-01 VITALS
TEMPERATURE: 97.8 F | DIASTOLIC BLOOD PRESSURE: 61 MMHG | OXYGEN SATURATION: 92 % | RESPIRATION RATE: 18 BRPM | HEART RATE: 78 BPM | SYSTOLIC BLOOD PRESSURE: 100 MMHG

## 2018-01-01 DIAGNOSIS — J44.1 COPD WITH ACUTE EXACERBATION (HCC): Primary | ICD-10-CM

## 2018-01-01 DIAGNOSIS — I50.43 ACUTE ON CHRONIC COMBINED SYSTOLIC AND DIASTOLIC CHF (CONGESTIVE HEART FAILURE) (HCC): ICD-10-CM

## 2018-01-01 DIAGNOSIS — N18.30 STAGE 3 CHRONIC KIDNEY DISEASE (HCC): Chronic | ICD-10-CM

## 2018-01-01 DIAGNOSIS — I50.42 CHRONIC COMBINED SYSTOLIC AND DIASTOLIC CONGESTIVE HEART FAILURE (HCC): Chronic | ICD-10-CM

## 2018-01-01 LAB
ALBUMIN SERPL-MCNC: 3.2 G/DL (ref 3.2–4.6)
ALBUMIN SERPL-MCNC: 3.7 G/DL (ref 3.2–4.6)
ALBUMIN SERPL-MCNC: 3.9 G/DL (ref 3.2–4.6)
ALBUMIN/GLOB SERPL: 1.1 {RATIO} (ref 1.2–3.5)
ALP SERPL-CCNC: 115 U/L (ref 50–136)
ALP SERPL-CCNC: 135 U/L (ref 50–136)
ALP SERPL-CCNC: 145 U/L (ref 50–136)
ALT SERPL-CCNC: 100 U/L (ref 12–65)
ALT SERPL-CCNC: 110 U/L (ref 12–65)
ALT SERPL-CCNC: 87 U/L (ref 12–65)
ANION GAP SERPL CALC-SCNC: 10 MMOL/L (ref 7–16)
ANION GAP SERPL CALC-SCNC: 11 MMOL/L (ref 7–16)
ANION GAP SERPL CALC-SCNC: 12 MMOL/L (ref 7–16)
ANION GAP SERPL CALC-SCNC: 7 MMOL/L (ref 7–16)
ANION GAP SERPL CALC-SCNC: 8 MMOL/L (ref 7–16)
AST SERPL-CCNC: 40 U/L (ref 15–37)
AST SERPL-CCNC: 65 U/L (ref 15–37)
AST SERPL-CCNC: 90 U/L (ref 15–37)
ATRIAL RATE: 112 BPM
ATRIAL RATE: 119 BPM
ATRIAL RATE: 248 BPM
ATRIAL RATE: 82 BPM
BASOPHILS # BLD: 0 K/UL (ref 0–0.2)
BASOPHILS NFR BLD: 1 % (ref 0–2)
BILIRUB DIRECT SERPL-MCNC: 0.5 MG/DL
BILIRUB DIRECT SERPL-MCNC: 0.6 MG/DL
BILIRUB SERPL-MCNC: 1.1 MG/DL (ref 0.2–1.1)
BILIRUB SERPL-MCNC: 1.4 MG/DL (ref 0.2–1.1)
BILIRUB SERPL-MCNC: 1.8 MG/DL (ref 0.2–1.1)
BNP SERPL-MCNC: 1215 PG/ML
BNP SERPL-MCNC: 2443 PG/ML
BNP SERPL-MCNC: 849 PG/ML
BNP SERPL-MCNC: 907 PG/ML
BUN SERPL-MCNC: 17 MG/DL (ref 8–23)
BUN SERPL-MCNC: 18 MG/DL (ref 8–23)
BUN SERPL-MCNC: 20 MG/DL (ref 8–23)
BUN SERPL-MCNC: 23 MG/DL (ref 8–23)
BUN SERPL-MCNC: 26 MG/DL (ref 8–23)
BUN SERPL-MCNC: 42 MG/DL (ref 8–23)
BUN SERPL-MCNC: 45 MG/DL (ref 8–23)
BUN SERPL-MCNC: 49 MG/DL (ref 8–23)
CALCIUM SERPL-MCNC: 8.4 MG/DL (ref 8.3–10.4)
CALCIUM SERPL-MCNC: 8.4 MG/DL (ref 8.3–10.4)
CALCIUM SERPL-MCNC: 8.7 MG/DL (ref 8.3–10.4)
CALCIUM SERPL-MCNC: 9 MG/DL (ref 8.3–10.4)
CALCIUM SERPL-MCNC: 9.4 MG/DL (ref 8.3–10.4)
CALCIUM SERPL-MCNC: 9.4 MG/DL (ref 8.3–10.4)
CALCULATED P AXIS, ECG09: 37 DEGREES
CALCULATED P AXIS, ECG09: 66 DEGREES
CALCULATED P AXIS, ECG09: 93 DEGREES
CALCULATED R AXIS, ECG10: -21 DEGREES
CALCULATED R AXIS, ECG10: -63 DEGREES
CALCULATED R AXIS, ECG10: -65 DEGREES
CALCULATED R AXIS, ECG10: -68 DEGREES
CALCULATED T AXIS, ECG11: -133 DEGREES
CALCULATED T AXIS, ECG11: -136 DEGREES
CALCULATED T AXIS, ECG11: 100 DEGREES
CALCULATED T AXIS, ECG11: 160 DEGREES
CHLORIDE SERPL-SCNC: 104 MMOL/L (ref 98–107)
CHLORIDE SERPL-SCNC: 104 MMOL/L (ref 98–107)
CHLORIDE SERPL-SCNC: 106 MMOL/L (ref 98–107)
CHLORIDE SERPL-SCNC: 108 MMOL/L (ref 98–107)
CHLORIDE SERPL-SCNC: 91 MMOL/L (ref 98–107)
CHLORIDE SERPL-SCNC: 91 MMOL/L (ref 98–107)
CHLORIDE SERPL-SCNC: 95 MMOL/L (ref 98–107)
CHLORIDE SERPL-SCNC: 99 MMOL/L (ref 98–107)
CO2 SERPL-SCNC: 25 MMOL/L (ref 21–32)
CO2 SERPL-SCNC: 27 MMOL/L (ref 21–32)
CO2 SERPL-SCNC: 27 MMOL/L (ref 21–32)
CO2 SERPL-SCNC: 28 MMOL/L (ref 21–32)
CO2 SERPL-SCNC: 28 MMOL/L (ref 21–32)
CO2 SERPL-SCNC: 29 MMOL/L (ref 21–32)
CO2 SERPL-SCNC: 31 MMOL/L (ref 21–32)
CO2 SERPL-SCNC: 32 MMOL/L (ref 21–32)
CREAT SERPL-MCNC: 1.43 MG/DL (ref 0.8–1.5)
CREAT SERPL-MCNC: 1.52 MG/DL (ref 0.8–1.5)
CREAT SERPL-MCNC: 1.58 MG/DL (ref 0.8–1.5)
CREAT SERPL-MCNC: 1.59 MG/DL (ref 0.8–1.5)
CREAT SERPL-MCNC: 1.63 MG/DL (ref 0.8–1.5)
CREAT SERPL-MCNC: 1.81 MG/DL (ref 0.8–1.5)
CREAT SERPL-MCNC: 1.84 MG/DL (ref 0.8–1.5)
CREAT SERPL-MCNC: 2.44 MG/DL (ref 0.8–1.5)
DIAGNOSIS, 93000: NORMAL
DIFFERENTIAL METHOD BLD: ABNORMAL
EOSINOPHIL # BLD: 0 K/UL (ref 0–0.8)
EOSINOPHIL NFR BLD: 1 % (ref 0.5–7.8)
ERYTHROCYTE [DISTWIDTH] IN BLOOD BY AUTOMATED COUNT: 15.2 % (ref 11.9–14.6)
ERYTHROCYTE [DISTWIDTH] IN BLOOD BY AUTOMATED COUNT: 15.4 % (ref 11.9–14.6)
EST. AVERAGE GLUCOSE BLD GHB EST-MCNC: 186 MG/DL
GLOBULIN SER CALC-MCNC: 3 G/DL (ref 2.3–3.5)
GLOBULIN SER CALC-MCNC: 3.4 G/DL (ref 2.3–3.5)
GLOBULIN SER CALC-MCNC: 3.7 G/DL (ref 2.3–3.5)
GLUCOSE BLD STRIP.AUTO-MCNC: 121 MG/DL (ref 65–100)
GLUCOSE BLD STRIP.AUTO-MCNC: 128 MG/DL (ref 65–100)
GLUCOSE BLD STRIP.AUTO-MCNC: 130 MG/DL (ref 65–100)
GLUCOSE BLD STRIP.AUTO-MCNC: 137 MG/DL (ref 65–100)
GLUCOSE BLD STRIP.AUTO-MCNC: 137 MG/DL (ref 65–100)
GLUCOSE BLD STRIP.AUTO-MCNC: 140 MG/DL (ref 65–100)
GLUCOSE BLD STRIP.AUTO-MCNC: 144 MG/DL (ref 65–100)
GLUCOSE BLD STRIP.AUTO-MCNC: 153 MG/DL (ref 65–100)
GLUCOSE BLD STRIP.AUTO-MCNC: 182 MG/DL (ref 65–100)
GLUCOSE BLD STRIP.AUTO-MCNC: 190 MG/DL (ref 65–100)
GLUCOSE BLD STRIP.AUTO-MCNC: 191 MG/DL (ref 65–100)
GLUCOSE BLD STRIP.AUTO-MCNC: 199 MG/DL (ref 65–100)
GLUCOSE BLD STRIP.AUTO-MCNC: 212 MG/DL (ref 65–100)
GLUCOSE BLD STRIP.AUTO-MCNC: 213 MG/DL (ref 65–100)
GLUCOSE BLD STRIP.AUTO-MCNC: 217 MG/DL (ref 65–100)
GLUCOSE BLD STRIP.AUTO-MCNC: 218 MG/DL (ref 65–100)
GLUCOSE BLD STRIP.AUTO-MCNC: 230 MG/DL (ref 65–100)
GLUCOSE BLD STRIP.AUTO-MCNC: 236 MG/DL (ref 65–100)
GLUCOSE BLD STRIP.AUTO-MCNC: 251 MG/DL (ref 65–100)
GLUCOSE BLD STRIP.AUTO-MCNC: 256 MG/DL (ref 65–100)
GLUCOSE BLD STRIP.AUTO-MCNC: 263 MG/DL (ref 65–100)
GLUCOSE BLD STRIP.AUTO-MCNC: 271 MG/DL (ref 65–100)
GLUCOSE BLD STRIP.AUTO-MCNC: 273 MG/DL (ref 65–100)
GLUCOSE BLD STRIP.AUTO-MCNC: 283 MG/DL (ref 65–100)
GLUCOSE BLD STRIP.AUTO-MCNC: 286 MG/DL (ref 65–100)
GLUCOSE BLD STRIP.AUTO-MCNC: 287 MG/DL (ref 65–100)
GLUCOSE BLD STRIP.AUTO-MCNC: 323 MG/DL (ref 65–100)
GLUCOSE BLD STRIP.AUTO-MCNC: 371 MG/DL (ref 65–100)
GLUCOSE SERPL-MCNC: 126 MG/DL (ref 65–100)
GLUCOSE SERPL-MCNC: 138 MG/DL (ref 65–100)
GLUCOSE SERPL-MCNC: 141 MG/DL (ref 65–100)
GLUCOSE SERPL-MCNC: 173 MG/DL (ref 65–100)
GLUCOSE SERPL-MCNC: 179 MG/DL (ref 65–100)
GLUCOSE SERPL-MCNC: 214 MG/DL (ref 65–100)
GLUCOSE SERPL-MCNC: 300 MG/DL (ref 65–100)
GLUCOSE SERPL-MCNC: 341 MG/DL (ref 65–100)
HBA1C MFR BLD: 8.1 % (ref 4.8–6)
HCT VFR BLD AUTO: 33.7 % (ref 41.1–50.3)
HCT VFR BLD AUTO: 34.5 % (ref 41.1–50.3)
HCT VFR BLD AUTO: 36.5 % (ref 41.1–50.3)
HCT VFR BLD AUTO: 38.2 % (ref 41.1–50.3)
HGB BLD-MCNC: 11 G/DL (ref 13.6–17.2)
HGB BLD-MCNC: 11.3 G/DL (ref 13.6–17.2)
HGB BLD-MCNC: 12.1 G/DL (ref 13.6–17.2)
HGB BLD-MCNC: 12.7 G/DL (ref 13.6–17.2)
IMM GRANULOCYTES # BLD: 0 K/UL (ref 0–0.5)
IMM GRANULOCYTES NFR BLD AUTO: 0 % (ref 0–5)
LEVETIRACETAM SERPL-MCNC: 27.2 UG/ML (ref 10–40)
LYMPHOCYTES # BLD: 2 K/UL (ref 0.5–4.6)
LYMPHOCYTES NFR BLD: 35 % (ref 13–44)
MAGNESIUM SERPL-MCNC: 1.7 MG/DL (ref 1.8–2.4)
MAGNESIUM SERPL-MCNC: 1.9 MG/DL (ref 1.8–2.4)
MAGNESIUM SERPL-MCNC: 2.2 MG/DL (ref 1.8–2.4)
MAGNESIUM SERPL-MCNC: 2.2 MG/DL (ref 1.8–2.4)
MAGNESIUM SERPL-MCNC: 2.3 MG/DL (ref 1.8–2.4)
MAGNESIUM SERPL-MCNC: 2.5 MG/DL (ref 1.8–2.4)
MAGNESIUM SERPL-MCNC: 2.6 MG/DL (ref 1.8–2.4)
MCH RBC QN AUTO: 24.4 PG (ref 26.1–32.9)
MCH RBC QN AUTO: 24.5 PG (ref 26.1–32.9)
MCH RBC QN AUTO: 24.6 PG (ref 26.1–32.9)
MCH RBC QN AUTO: 25 PG (ref 26.1–32.9)
MCHC RBC AUTO-ENTMCNC: 32.6 G/DL (ref 31.4–35)
MCHC RBC AUTO-ENTMCNC: 32.8 G/DL (ref 31.4–35)
MCHC RBC AUTO-ENTMCNC: 33.2 G/DL (ref 31.4–35)
MCHC RBC AUTO-ENTMCNC: 33.2 G/DL (ref 31.4–35)
MCV RBC AUTO: 73.5 FL (ref 79.6–97.8)
MCV RBC AUTO: 75.1 FL (ref 79.6–97.8)
MCV RBC AUTO: 75.2 FL (ref 79.6–97.8)
MCV RBC AUTO: 75.4 FL (ref 79.6–97.8)
MM INDURATION POC: 0 MM (ref 0–5)
MONOCYTES # BLD: 0.8 K/UL (ref 0.1–1.3)
MONOCYTES NFR BLD: 14 % (ref 4–12)
NEUTS SEG # BLD: 2.7 K/UL (ref 1.7–8.2)
NEUTS SEG NFR BLD: 49 % (ref 43–78)
P-R INTERVAL, ECG05: 136 MS
P-R INTERVAL, ECG05: 246 MS
P-R INTERVAL, ECG05: 268 MS
PLATELET # BLD AUTO: 118 K/UL (ref 150–450)
PLATELET # BLD AUTO: 133 K/UL (ref 150–450)
PLATELET # BLD AUTO: 142 K/UL (ref 150–450)
PLATELET # BLD AUTO: 173 K/UL (ref 150–450)
PMV BLD AUTO: ABNORMAL FL (ref 10.8–14.1)
POTASSIUM SERPL-SCNC: 3 MMOL/L (ref 3.5–5.1)
POTASSIUM SERPL-SCNC: 3.6 MMOL/L (ref 3.5–5.1)
POTASSIUM SERPL-SCNC: 3.7 MMOL/L (ref 3.5–5.1)
POTASSIUM SERPL-SCNC: 4.1 MMOL/L (ref 3.5–5.1)
POTASSIUM SERPL-SCNC: 4.1 MMOL/L (ref 3.5–5.1)
POTASSIUM SERPL-SCNC: 4.3 MMOL/L (ref 3.5–5.1)
PPD POC: NEGATIVE NEGATIVE
PROT SERPL-MCNC: 6.2 G/DL (ref 6.3–8.2)
PROT SERPL-MCNC: 7.1 G/DL (ref 6.3–8.2)
PROT SERPL-MCNC: 7.6 G/DL (ref 6.3–8.2)
Q-T INTERVAL, ECG07: 288 MS
Q-T INTERVAL, ECG07: 352 MS
Q-T INTERVAL, ECG07: 362 MS
Q-T INTERVAL, ECG07: 406 MS
QRS DURATION, ECG06: 112 MS
QRS DURATION, ECG06: 114 MS
QTC CALCULATION (BEZET), ECG08: 393 MS
QTC CALCULATION (BEZET), ECG08: 474 MS
QTC CALCULATION (BEZET), ECG08: 505 MS
QTC CALCULATION (BEZET), ECG08: 509 MS
RBC # BLD AUTO: 4.49 M/UL (ref 4.23–5.67)
RBC # BLD AUTO: 4.59 M/UL (ref 4.23–5.67)
RBC # BLD AUTO: 4.84 M/UL (ref 4.23–5.67)
RBC # BLD AUTO: 5.2 M/UL (ref 4.23–5.67)
SODIUM SERPL-SCNC: 132 MMOL/L (ref 136–145)
SODIUM SERPL-SCNC: 134 MMOL/L (ref 136–145)
SODIUM SERPL-SCNC: 134 MMOL/L (ref 136–145)
SODIUM SERPL-SCNC: 139 MMOL/L (ref 136–145)
SODIUM SERPL-SCNC: 139 MMOL/L (ref 136–145)
SODIUM SERPL-SCNC: 141 MMOL/L (ref 136–145)
SODIUM SERPL-SCNC: 142 MMOL/L (ref 136–145)
SODIUM SERPL-SCNC: 142 MMOL/L (ref 136–145)
TROPONIN I SERPL-MCNC: <0.02 NG/ML (ref 0.02–0.05)
VENTRICULAR RATE, ECG03: 112 BPM
VENTRICULAR RATE, ECG03: 119 BPM
VENTRICULAR RATE, ECG03: 124 BPM
VENTRICULAR RATE, ECG03: 82 BPM
WBC # BLD AUTO: 5 K/UL (ref 4.3–11.1)
WBC # BLD AUTO: 5.6 K/UL (ref 4.3–11.1)
WBC # BLD AUTO: 5.6 K/UL (ref 4.3–11.1)
WBC # BLD AUTO: 8.6 K/UL (ref 4.3–11.1)

## 2018-01-01 PROCEDURE — 80177 DRUG SCRN QUAN LEVETIRACETAM: CPT | Performed by: NURSE PRACTITIONER

## 2018-01-01 PROCEDURE — 77010033678 HC OXYGEN DAILY

## 2018-01-01 PROCEDURE — C1898 LEAD, PMKR, OTHER THAN TRANS: HCPCS

## 2018-01-01 PROCEDURE — 74011636637 HC RX REV CODE- 636/637: Performed by: NURSE PRACTITIONER

## 2018-01-01 PROCEDURE — 65660000000 HC RM CCU STEPDOWN

## 2018-01-01 PROCEDURE — 74011636637 HC RX REV CODE- 636/637: Performed by: HOSPITALIST

## 2018-01-01 PROCEDURE — 80048 BASIC METABOLIC PNL TOTAL CA: CPT | Performed by: PHYSICIAN ASSISTANT

## 2018-01-01 PROCEDURE — 71046 X-RAY EXAM CHEST 2 VIEWS: CPT

## 2018-01-01 PROCEDURE — 74011000250 HC RX REV CODE- 250: Performed by: INTERNAL MEDICINE

## 2018-01-01 PROCEDURE — 96375 TX/PRO/DX INJ NEW DRUG ADDON: CPT | Performed by: EMERGENCY MEDICINE

## 2018-01-01 PROCEDURE — 83880 ASSAY OF NATRIURETIC PEPTIDE: CPT | Performed by: NURSE PRACTITIONER

## 2018-01-01 PROCEDURE — 97165 OT EVAL LOW COMPLEX 30 MIN: CPT

## 2018-01-01 PROCEDURE — 74011250637 HC RX REV CODE- 250/637: Performed by: HOSPITALIST

## 2018-01-01 PROCEDURE — 80053 COMPREHEN METABOLIC PANEL: CPT | Performed by: EMERGENCY MEDICINE

## 2018-01-01 PROCEDURE — 36415 COLL VENOUS BLD VENIPUNCTURE: CPT | Performed by: NURSE PRACTITIONER

## 2018-01-01 PROCEDURE — 80076 HEPATIC FUNCTION PANEL: CPT | Performed by: NURSE PRACTITIONER

## 2018-01-01 PROCEDURE — 3331090002 HH PPS REVENUE DEBIT

## 2018-01-01 PROCEDURE — C1777 LEAD, AICD, ENDO SINGLE COIL: HCPCS

## 2018-01-01 PROCEDURE — 74011250636 HC RX REV CODE- 250/636: Performed by: INTERNAL MEDICINE

## 2018-01-01 PROCEDURE — 74011250636 HC RX REV CODE- 250/636: Performed by: EMERGENCY MEDICINE

## 2018-01-01 PROCEDURE — 74011636637 HC RX REV CODE- 636/637: Performed by: INTERNAL MEDICINE

## 2018-01-01 PROCEDURE — 02H63KZ INSERTION OF DEFIBRILLATOR LEAD INTO RIGHT ATRIUM, PERCUTANEOUS APPROACH: ICD-10-PCS | Performed by: INTERNAL MEDICINE

## 2018-01-01 PROCEDURE — 96365 THER/PROPH/DIAG IV INF INIT: CPT | Performed by: EMERGENCY MEDICINE

## 2018-01-01 PROCEDURE — 85027 COMPLETE CBC AUTOMATED: CPT | Performed by: INTERNAL MEDICINE

## 2018-01-01 PROCEDURE — G0151 HHCP-SERV OF PT,EA 15 MIN: HCPCS

## 2018-01-01 PROCEDURE — 93005 ELECTROCARDIOGRAM TRACING: CPT | Performed by: INTERNAL MEDICINE

## 2018-01-01 PROCEDURE — C8929 TTE W OR WO FOL WCON,DOPPLER: HCPCS

## 2018-01-01 PROCEDURE — 74011250637 HC RX REV CODE- 250/637: Performed by: INTERNAL MEDICINE

## 2018-01-01 PROCEDURE — 3331090001 HH PPS REVENUE CREDIT

## 2018-01-01 PROCEDURE — 76060000034 HC ANESTHESIA 1.5 TO 2 HR: Performed by: INTERNAL MEDICINE

## 2018-01-01 PROCEDURE — 83735 ASSAY OF MAGNESIUM: CPT | Performed by: PHYSICIAN ASSISTANT

## 2018-01-01 PROCEDURE — 0JH609Z INSERTION OF CARDIAC RESYNCHRONIZATION DEFIBRILLATOR PULSE GENERATOR INTO CHEST SUBCUTANEOUS TISSUE AND FASCIA, OPEN APPROACH: ICD-10-PCS | Performed by: INTERNAL MEDICINE

## 2018-01-01 PROCEDURE — 80048 BASIC METABOLIC PNL TOTAL CA: CPT | Performed by: INTERNAL MEDICINE

## 2018-01-01 PROCEDURE — 33249 INSJ/RPLCMT DEFIB W/LEAD(S): CPT

## 2018-01-01 PROCEDURE — G0299 HHS/HOSPICE OF RN EA 15 MIN: HCPCS

## 2018-01-01 PROCEDURE — 82962 GLUCOSE BLOOD TEST: CPT

## 2018-01-01 PROCEDURE — 94760 N-INVAS EAR/PLS OXIMETRY 1: CPT

## 2018-01-01 PROCEDURE — C1751 CATH, INF, PER/CENT/MIDLINE: HCPCS

## 2018-01-01 PROCEDURE — 36415 COLL VENOUS BLD VENIPUNCTURE: CPT | Performed by: INTERNAL MEDICINE

## 2018-01-01 PROCEDURE — 74011000258 HC RX REV CODE- 258: Performed by: NURSE PRACTITIONER

## 2018-01-01 PROCEDURE — 74011250637 HC RX REV CODE- 250/637: Performed by: NURSE PRACTITIONER

## 2018-01-01 PROCEDURE — 85027 COMPLETE CBC AUTOMATED: CPT | Performed by: NURSE PRACTITIONER

## 2018-01-01 PROCEDURE — 85027 COMPLETE CBC AUTOMATED: CPT | Performed by: PHYSICIAN ASSISTANT

## 2018-01-01 PROCEDURE — 93005 ELECTROCARDIOGRAM TRACING: CPT | Performed by: NURSE PRACTITIONER

## 2018-01-01 PROCEDURE — 94640 AIRWAY INHALATION TREATMENT: CPT

## 2018-01-01 PROCEDURE — 83735 ASSAY OF MAGNESIUM: CPT | Performed by: NURSE PRACTITIONER

## 2018-01-01 PROCEDURE — 74011000250 HC RX REV CODE- 250: Performed by: NURSE PRACTITIONER

## 2018-01-01 PROCEDURE — C1887 CATHETER, GUIDING: HCPCS

## 2018-01-01 PROCEDURE — 76937 US GUIDE VASCULAR ACCESS: CPT

## 2018-01-01 PROCEDURE — 76450000000

## 2018-01-01 PROCEDURE — G0157 HHC PT ASSISTANT EA 15: HCPCS

## 2018-01-01 PROCEDURE — 74011636320 HC RX REV CODE- 636/320: Performed by: INTERNAL MEDICINE

## 2018-01-01 PROCEDURE — 36415 COLL VENOUS BLD VENIPUNCTURE: CPT | Performed by: PHYSICIAN ASSISTANT

## 2018-01-01 PROCEDURE — C1892 INTRO/SHEATH,FIXED,PEEL-AWAY: HCPCS

## 2018-01-01 PROCEDURE — 83036 HEMOGLOBIN GLYCOSYLATED A1C: CPT | Performed by: INTERNAL MEDICINE

## 2018-01-01 PROCEDURE — 74011000250 HC RX REV CODE- 250: Performed by: EMERGENCY MEDICINE

## 2018-01-01 PROCEDURE — 76700 US EXAM ABDOM COMPLETE: CPT

## 2018-01-01 PROCEDURE — C1882 AICD, OTHER THAN SING/DUAL: HCPCS

## 2018-01-01 PROCEDURE — A4565 SLINGS: HCPCS

## 2018-01-01 PROCEDURE — 97162 PT EVAL MOD COMPLEX 30 MIN: CPT

## 2018-01-01 PROCEDURE — 77030013687 HC GD NDL BARD -B

## 2018-01-01 PROCEDURE — 80048 BASIC METABOLIC PNL TOTAL CA: CPT | Performed by: NURSE PRACTITIONER

## 2018-01-01 PROCEDURE — 97530 THERAPEUTIC ACTIVITIES: CPT

## 2018-01-01 PROCEDURE — C1900 LEAD, CORONARY VENOUS: HCPCS

## 2018-01-01 PROCEDURE — C1769 GUIDE WIRE: HCPCS

## 2018-01-01 PROCEDURE — 83735 ASSAY OF MAGNESIUM: CPT | Performed by: INTERNAL MEDICINE

## 2018-01-01 PROCEDURE — 74011000250 HC RX REV CODE- 250: Performed by: HOSPITALIST

## 2018-01-01 PROCEDURE — 83880 ASSAY OF NATRIURETIC PEPTIDE: CPT | Performed by: EMERGENCY MEDICINE

## 2018-01-01 PROCEDURE — 74011000272 HC RX REV CODE- 272: Performed by: INTERNAL MEDICINE

## 2018-01-01 PROCEDURE — 77030012935 HC DRSG AQUACEL BMS -B

## 2018-01-01 PROCEDURE — 74011250636 HC RX REV CODE- 250/636

## 2018-01-01 PROCEDURE — 74011250637 HC RX REV CODE- 250/637: Performed by: PHYSICIAN ASSISTANT

## 2018-01-01 PROCEDURE — 84484 ASSAY OF TROPONIN QUANT: CPT | Performed by: EMERGENCY MEDICINE

## 2018-01-01 PROCEDURE — 96372 THER/PROPH/DIAG INJ SC/IM: CPT | Performed by: EMERGENCY MEDICINE

## 2018-01-01 PROCEDURE — 80076 HEPATIC FUNCTION PANEL: CPT | Performed by: INTERNAL MEDICINE

## 2018-01-01 PROCEDURE — G0155 HHCP-SVS OF CSW,EA 15 MIN: HCPCS

## 2018-01-01 PROCEDURE — 93005 ELECTROCARDIOGRAM TRACING: CPT | Performed by: EMERGENCY MEDICINE

## 2018-01-01 PROCEDURE — 74011000250 HC RX REV CODE- 250

## 2018-01-01 PROCEDURE — 99284 EMERGENCY DEPT VISIT MOD MDM: CPT | Performed by: EMERGENCY MEDICINE

## 2018-01-01 PROCEDURE — 74011000302 HC RX REV CODE- 302: Performed by: INTERNAL MEDICINE

## 2018-01-01 PROCEDURE — 86580 TB INTRADERMAL TEST: CPT | Performed by: INTERNAL MEDICINE

## 2018-01-01 PROCEDURE — 71045 X-RAY EXAM CHEST 1 VIEW: CPT

## 2018-01-01 PROCEDURE — 02HL3KZ INSERTION OF DEFIBRILLATOR LEAD INTO LEFT VENTRICLE, PERCUTANEOUS APPROACH: ICD-10-PCS | Performed by: INTERNAL MEDICINE

## 2018-01-01 PROCEDURE — 94762 N-INVAS EAR/PLS OXIMTRY CONT: CPT

## 2018-01-01 PROCEDURE — 02HK3KZ INSERTION OF DEFIBRILLATOR LEAD INTO RIGHT VENTRICLE, PERCUTANEOUS APPROACH: ICD-10-PCS | Performed by: INTERNAL MEDICINE

## 2018-01-01 PROCEDURE — 85025 COMPLETE CBC W/AUTO DIFF WBC: CPT | Performed by: EMERGENCY MEDICINE

## 2018-01-01 PROCEDURE — 400013 HH SOC

## 2018-01-01 PROCEDURE — 77030019605

## 2018-01-01 PROCEDURE — 33225 L VENTRIC PACING LEAD ADD-ON: CPT

## 2018-01-01 PROCEDURE — G0152 HHCP-SERV OF OT,EA 15 MIN: HCPCS

## 2018-01-01 RX ORDER — DILTIAZEM HYDROCHLORIDE 120 MG/1
120 CAPSULE, COATED, EXTENDED RELEASE ORAL DAILY
Status: DISCONTINUED | OUTPATIENT
Start: 2018-01-01 | End: 2018-01-01

## 2018-01-01 RX ORDER — METOPROLOL SUCCINATE 100 MG/1
100 TABLET, EXTENDED RELEASE ORAL 2 TIMES DAILY
Status: DISCONTINUED | OUTPATIENT
Start: 2018-01-01 | End: 2018-01-01 | Stop reason: HOSPADM

## 2018-01-01 RX ORDER — ALBUTEROL SULFATE 0.83 MG/ML
2.5 SOLUTION RESPIRATORY (INHALATION)
Status: DISCONTINUED | OUTPATIENT
Start: 2018-01-01 | End: 2018-01-01

## 2018-01-01 RX ORDER — BUDESONIDE 0.5 MG/2ML
500 INHALANT ORAL
Status: DISCONTINUED | OUTPATIENT
Start: 2018-01-01 | End: 2018-01-01 | Stop reason: HOSPADM

## 2018-01-01 RX ORDER — ALBUTEROL SULFATE 0.83 MG/ML
2.5 SOLUTION RESPIRATORY (INHALATION)
Status: DISCONTINUED | OUTPATIENT
Start: 2018-01-01 | End: 2018-01-01 | Stop reason: HOSPADM

## 2018-01-01 RX ORDER — HEPARIN SODIUM 5000 [USP'U]/ML
5000 INJECTION, SOLUTION INTRAVENOUS; SUBCUTANEOUS EVERY 8 HOURS
Status: DISCONTINUED | OUTPATIENT
Start: 2018-01-01 | End: 2018-01-01

## 2018-01-01 RX ORDER — AZITHROMYCIN 250 MG/1
250 TABLET, FILM COATED ORAL DAILY
Status: DISCONTINUED | OUTPATIENT
Start: 2018-01-01 | End: 2018-01-01 | Stop reason: HOSPADM

## 2018-01-01 RX ORDER — POTASSIUM CHLORIDE 20 MEQ/1
40 TABLET, EXTENDED RELEASE ORAL
Status: COMPLETED | OUTPATIENT
Start: 2018-01-01 | End: 2018-01-01

## 2018-01-01 RX ORDER — CARVEDILOL 25 MG/1
25 TABLET ORAL 2 TIMES DAILY WITH MEALS
Status: DISCONTINUED | OUTPATIENT
Start: 2018-01-01 | End: 2018-01-01

## 2018-01-01 RX ORDER — METOPROLOL SUCCINATE 50 MG/1
50 TABLET, EXTENDED RELEASE ORAL 2 TIMES DAILY
Status: DISCONTINUED | OUTPATIENT
Start: 2018-01-01 | End: 2018-01-01

## 2018-01-01 RX ORDER — PREDNISONE 20 MG/1
20 TABLET ORAL DAILY
Status: DISCONTINUED | OUTPATIENT
Start: 2018-01-01 | End: 2018-01-01 | Stop reason: HOSPADM

## 2018-01-01 RX ORDER — POTASSIUM CHLORIDE 20 MEQ/1
40 TABLET, EXTENDED RELEASE ORAL 2 TIMES DAILY
Status: DISCONTINUED | OUTPATIENT
Start: 2018-01-01 | End: 2018-01-01

## 2018-01-01 RX ORDER — SODIUM CHLORIDE 0.9 % (FLUSH) 0.9 %
5-10 SYRINGE (ML) INJECTION AS NEEDED
Status: DISCONTINUED | OUTPATIENT
Start: 2018-01-01 | End: 2018-01-01 | Stop reason: HOSPADM

## 2018-01-01 RX ORDER — LEVETIRACETAM 750 MG/1
750 TABLET ORAL 2 TIMES DAILY
COMMUNITY

## 2018-01-01 RX ORDER — DILTIAZEM HYDROCHLORIDE 5 MG/ML
10 INJECTION INTRAVENOUS ONCE
Status: COMPLETED | OUTPATIENT
Start: 2018-01-01 | End: 2018-01-01

## 2018-01-01 RX ORDER — IPRATROPIUM BROMIDE AND ALBUTEROL SULFATE 2.5; .5 MG/3ML; MG/3ML
3 SOLUTION RESPIRATORY (INHALATION)
Status: DISCONTINUED | OUTPATIENT
Start: 2018-01-01 | End: 2018-01-01 | Stop reason: HOSPADM

## 2018-01-01 RX ORDER — FUROSEMIDE 10 MG/ML
40 INJECTION INTRAMUSCULAR; INTRAVENOUS 2 TIMES DAILY
Status: DISCONTINUED | OUTPATIENT
Start: 2018-01-01 | End: 2018-01-01

## 2018-01-01 RX ORDER — SODIUM CHLORIDE 0.9 % (FLUSH) 0.9 %
5-10 SYRINGE (ML) INJECTION EVERY 8 HOURS
Status: DISCONTINUED | OUTPATIENT
Start: 2018-01-01 | End: 2018-01-01 | Stop reason: HOSPADM

## 2018-01-01 RX ORDER — INSULIN LISPRO 100 [IU]/ML
INJECTION, SOLUTION INTRAVENOUS; SUBCUTANEOUS
Status: DISCONTINUED | OUTPATIENT
Start: 2018-01-01 | End: 2018-01-01

## 2018-01-01 RX ORDER — LEVOTHYROXINE SODIUM 75 UG/1
75 TABLET ORAL
COMMUNITY

## 2018-01-01 RX ORDER — INSULIN LISPRO 100 [IU]/ML
INJECTION, SOLUTION INTRAVENOUS; SUBCUTANEOUS
Status: DISCONTINUED | OUTPATIENT
Start: 2018-01-01 | End: 2018-01-01 | Stop reason: HOSPADM

## 2018-01-01 RX ORDER — FUROSEMIDE 40 MG/1
40 TABLET ORAL DAILY
Status: DISCONTINUED | OUTPATIENT
Start: 2018-01-01 | End: 2018-01-01 | Stop reason: HOSPADM

## 2018-01-01 RX ORDER — BISACODYL 5 MG
5 TABLET, DELAYED RELEASE (ENTERIC COATED) ORAL DAILY PRN
Status: DISCONTINUED | OUTPATIENT
Start: 2018-01-01 | End: 2018-01-01 | Stop reason: HOSPADM

## 2018-01-01 RX ORDER — AMIODARONE HYDROCHLORIDE 200 MG/1
200 TABLET ORAL
Status: ON HOLD | COMMUNITY
End: 2018-01-01

## 2018-01-01 RX ORDER — EPHEDRINE SULFATE 50 MG/ML
INJECTION, SOLUTION INTRAVENOUS AS NEEDED
Status: DISCONTINUED | OUTPATIENT
Start: 2018-01-01 | End: 2018-01-01 | Stop reason: HOSPADM

## 2018-01-01 RX ORDER — POLYETHYLENE GLYCOL 3350 17 G/17G
17 POWDER, FOR SOLUTION ORAL
Status: DISCONTINUED | OUTPATIENT
Start: 2018-01-01 | End: 2018-01-01 | Stop reason: HOSPADM

## 2018-01-01 RX ORDER — MAGNESIUM SULFATE HEPTAHYDRATE 40 MG/ML
2 INJECTION, SOLUTION INTRAVENOUS ONCE
Status: COMPLETED | OUTPATIENT
Start: 2018-01-01 | End: 2018-01-01

## 2018-01-01 RX ORDER — FLUTICASONE PROPIONATE AND SALMETEROL 250; 50 UG/1; UG/1
1 POWDER RESPIRATORY (INHALATION) 2 TIMES DAILY
Qty: 1 INHALER | Refills: 0 | Status: SHIPPED | OUTPATIENT
Start: 2018-01-01

## 2018-01-01 RX ORDER — FUROSEMIDE 40 MG/1
40 TABLET ORAL DAILY
Qty: 30 TAB | Refills: 0 | Status: SHIPPED | OUTPATIENT
Start: 2018-01-01

## 2018-01-01 RX ORDER — FUROSEMIDE 10 MG/ML
60 INJECTION INTRAMUSCULAR; INTRAVENOUS
Status: COMPLETED | OUTPATIENT
Start: 2018-01-01 | End: 2018-01-01

## 2018-01-01 RX ORDER — ZOLPIDEM TARTRATE 5 MG/1
5 TABLET ORAL
Status: DISCONTINUED | OUTPATIENT
Start: 2018-01-01 | End: 2018-01-01

## 2018-01-01 RX ORDER — HYDROCODONE BITARTRATE AND ACETAMINOPHEN 5; 325 MG/1; MG/1
1 TABLET ORAL
Status: DISCONTINUED | OUTPATIENT
Start: 2018-01-01 | End: 2018-01-01

## 2018-01-01 RX ORDER — LISINOPRIL 2.5 MG/1
2.5 TABLET ORAL DAILY
Qty: 30 TAB | Refills: 0 | Status: SHIPPED | OUTPATIENT
Start: 2018-01-01 | End: 2018-01-01 | Stop reason: ALTCHOICE

## 2018-01-01 RX ORDER — LORAZEPAM 1 MG/1
1 TABLET ORAL
Status: DISCONTINUED | OUTPATIENT
Start: 2018-01-01 | End: 2018-01-01

## 2018-01-01 RX ORDER — BUPIVACAINE HYDROCHLORIDE AND EPINEPHRINE 5; 5 MG/ML; UG/ML
60 INJECTION, SOLUTION EPIDURAL; INTRACAUDAL; PERINEURAL ONCE
Status: COMPLETED | OUTPATIENT
Start: 2018-01-01 | End: 2018-01-01

## 2018-01-01 RX ORDER — AZITHROMYCIN 250 MG/1
250 TABLET, FILM COATED ORAL DAILY
Qty: 3 TAB | Refills: 0 | Status: SHIPPED | OUTPATIENT
Start: 2018-01-01 | End: 2018-01-01

## 2018-01-01 RX ORDER — DEXTROSE 40 %
15 GEL (GRAM) ORAL AS NEEDED
Status: DISCONTINUED | OUTPATIENT
Start: 2018-01-01 | End: 2018-01-01 | Stop reason: HOSPADM

## 2018-01-01 RX ORDER — POTASSIUM CHLORIDE 750 MG/1
20 TABLET, FILM COATED, EXTENDED RELEASE ORAL 2 TIMES DAILY
COMMUNITY
End: 2018-01-01 | Stop reason: ALTCHOICE

## 2018-01-01 RX ORDER — SIMETHICONE 80 MG
80 TABLET,CHEWABLE ORAL
Status: DISCONTINUED | OUTPATIENT
Start: 2018-01-01 | End: 2018-01-01 | Stop reason: HOSPADM

## 2018-01-01 RX ORDER — ALBUTEROL SULFATE 0.83 MG/ML
2.5 SOLUTION RESPIRATORY (INHALATION) EVERY 8 HOURS
Status: DISCONTINUED | OUTPATIENT
Start: 2018-01-01 | End: 2018-01-01

## 2018-01-01 RX ORDER — IPRATROPIUM BROMIDE AND ALBUTEROL SULFATE 2.5; .5 MG/3ML; MG/3ML
3 SOLUTION RESPIRATORY (INHALATION)
Status: DISCONTINUED | OUTPATIENT
Start: 2018-01-01 | End: 2018-01-01

## 2018-01-01 RX ORDER — NALOXONE HYDROCHLORIDE 0.4 MG/ML
0.4 INJECTION, SOLUTION INTRAMUSCULAR; INTRAVENOUS; SUBCUTANEOUS AS NEEDED
Status: DISCONTINUED | OUTPATIENT
Start: 2018-01-01 | End: 2018-01-01 | Stop reason: HOSPADM

## 2018-01-01 RX ORDER — DEXTROSE 50 % IN WATER (D50W) INTRAVENOUS SYRINGE
25-50 AS NEEDED
Status: DISCONTINUED | OUTPATIENT
Start: 2018-01-01 | End: 2018-01-01 | Stop reason: HOSPADM

## 2018-01-01 RX ORDER — HYDRALAZINE HYDROCHLORIDE 20 MG/ML
20 INJECTION INTRAMUSCULAR; INTRAVENOUS
Status: DISCONTINUED | OUTPATIENT
Start: 2018-01-01 | End: 2018-01-01 | Stop reason: HOSPADM

## 2018-01-01 RX ORDER — DILTIAZEM HYDROCHLORIDE 180 MG/1
180 CAPSULE, COATED, EXTENDED RELEASE ORAL DAILY
Status: DISCONTINUED | OUTPATIENT
Start: 2018-01-01 | End: 2018-01-01

## 2018-01-01 RX ORDER — METOPROLOL SUCCINATE 100 MG/1
100 TABLET, EXTENDED RELEASE ORAL 2 TIMES DAILY
Qty: 60 TAB | Refills: 0 | Status: SHIPPED | OUTPATIENT
Start: 2018-01-01

## 2018-01-01 RX ORDER — MORPHINE SULFATE 2 MG/ML
2 INJECTION, SOLUTION INTRAMUSCULAR; INTRAVENOUS
Status: DISCONTINUED | OUTPATIENT
Start: 2018-01-01 | End: 2018-01-01 | Stop reason: HOSPADM

## 2018-01-01 RX ORDER — CEFAZOLIN SODIUM 1 G/3ML
INJECTION, POWDER, FOR SOLUTION INTRAMUSCULAR; INTRAVENOUS AS NEEDED
Status: DISCONTINUED | OUTPATIENT
Start: 2018-01-01 | End: 2018-01-01 | Stop reason: HOSPADM

## 2018-01-01 RX ORDER — MONTELUKAST SODIUM 10 MG/1
10 TABLET ORAL DAILY
Status: DISCONTINUED | OUTPATIENT
Start: 2018-01-01 | End: 2018-01-01 | Stop reason: HOSPADM

## 2018-01-01 RX ORDER — DIPHENHYDRAMINE HYDROCHLORIDE 50 MG/ML
25 INJECTION, SOLUTION INTRAMUSCULAR; INTRAVENOUS
Status: DISCONTINUED | OUTPATIENT
Start: 2018-01-01 | End: 2018-01-01

## 2018-01-01 RX ORDER — METOLAZONE 5 MG/1
5 TABLET ORAL DAILY
Status: DISCONTINUED | OUTPATIENT
Start: 2018-01-01 | End: 2018-01-01

## 2018-01-01 RX ORDER — ALPRAZOLAM 0.5 MG/1
0.25 TABLET ORAL
Status: DISCONTINUED | OUTPATIENT
Start: 2018-01-01 | End: 2018-01-01 | Stop reason: HOSPADM

## 2018-01-01 RX ORDER — AMOXICILLIN 250 MG
2 CAPSULE ORAL
Status: DISCONTINUED | OUTPATIENT
Start: 2018-01-01 | End: 2018-01-01 | Stop reason: HOSPADM

## 2018-01-01 RX ORDER — AMIODARONE HYDROCHLORIDE 200 MG/1
200 TABLET ORAL DAILY
Status: DISCONTINUED | OUTPATIENT
Start: 2018-01-01 | End: 2018-01-01

## 2018-01-01 RX ORDER — ROSUVASTATIN CALCIUM 20 MG/1
20 TABLET, COATED ORAL
Status: DISCONTINUED | OUTPATIENT
Start: 2018-01-01 | End: 2018-01-01 | Stop reason: HOSPADM

## 2018-01-01 RX ORDER — HYDROCODONE BITARTRATE AND ACETAMINOPHEN 5; 325 MG/1; MG/1
1 TABLET ORAL
Status: DISCONTINUED | OUTPATIENT
Start: 2018-01-01 | End: 2018-01-01 | Stop reason: HOSPADM

## 2018-01-01 RX ORDER — GUAIFENESIN/DEXTROMETHORPHAN 100-10MG/5
10 SYRUP ORAL
Status: DISCONTINUED | OUTPATIENT
Start: 2018-01-01 | End: 2018-01-01 | Stop reason: HOSPADM

## 2018-01-01 RX ORDER — POTASSIUM CHLORIDE 20 MEQ/1
40 TABLET, EXTENDED RELEASE ORAL EVERY 12 HOURS
Status: DISCONTINUED | OUTPATIENT
Start: 2018-01-01 | End: 2018-01-01 | Stop reason: SDUPTHER

## 2018-01-01 RX ORDER — IPRATROPIUM BROMIDE AND ALBUTEROL SULFATE 2.5; .5 MG/3ML; MG/3ML
3 SOLUTION RESPIRATORY (INHALATION)
Status: CANCELLED | OUTPATIENT
Start: 2018-01-01

## 2018-01-01 RX ORDER — SODIUM CHLORIDE, SODIUM LACTATE, POTASSIUM CHLORIDE, CALCIUM CHLORIDE 600; 310; 30; 20 MG/100ML; MG/100ML; MG/100ML; MG/100ML
INJECTION, SOLUTION INTRAVENOUS
Status: DISCONTINUED | OUTPATIENT
Start: 2018-01-01 | End: 2018-01-01 | Stop reason: HOSPADM

## 2018-01-01 RX ORDER — CEFAZOLIN SODIUM/WATER 2 G/20 ML
2 SYRINGE (ML) INTRAVENOUS EVERY 8 HOURS
Status: COMPLETED | OUTPATIENT
Start: 2018-01-01 | End: 2018-01-01

## 2018-01-01 RX ORDER — PROPOFOL 10 MG/ML
INJECTION, EMULSION INTRAVENOUS
Status: DISCONTINUED | OUTPATIENT
Start: 2018-01-01 | End: 2018-01-01 | Stop reason: HOSPADM

## 2018-01-01 RX ORDER — ONDANSETRON 2 MG/ML
4 INJECTION INTRAMUSCULAR; INTRAVENOUS
Status: DISCONTINUED | OUTPATIENT
Start: 2018-01-01 | End: 2018-01-01 | Stop reason: HOSPADM

## 2018-01-01 RX ORDER — ACETAMINOPHEN 325 MG/1
650 TABLET ORAL
Status: DISCONTINUED | OUTPATIENT
Start: 2018-01-01 | End: 2018-01-01 | Stop reason: HOSPADM

## 2018-01-01 RX ORDER — FUROSEMIDE 10 MG/ML
80 INJECTION INTRAMUSCULAR; INTRAVENOUS 2 TIMES DAILY
Status: DISCONTINUED | OUTPATIENT
Start: 2018-01-01 | End: 2018-01-01

## 2018-01-01 RX ORDER — POTASSIUM CHLORIDE 20 MEQ/1
40 TABLET, EXTENDED RELEASE ORAL 2 TIMES DAILY
Status: COMPLETED | OUTPATIENT
Start: 2018-01-01 | End: 2018-01-01

## 2018-01-01 RX ORDER — GUAIFENESIN 100 MG/5ML
81 LIQUID (ML) ORAL DAILY
Status: DISCONTINUED | OUTPATIENT
Start: 2018-01-01 | End: 2018-01-01 | Stop reason: HOSPADM

## 2018-01-01 RX ADMIN — HEPARIN SODIUM 5000 UNITS: 5000 INJECTION INTRAVENOUS; SUBCUTANEOUS at 12:55

## 2018-01-01 RX ADMIN — INSULIN LISPRO 4 UNITS: 100 INJECTION, SOLUTION INTRAVENOUS; SUBCUTANEOUS at 21:56

## 2018-01-01 RX ADMIN — FUROSEMIDE 40 MG: 10 INJECTION, SOLUTION INTRAMUSCULAR; INTRAVENOUS at 17:57

## 2018-01-01 RX ADMIN — CARVEDILOL 25 MG: 25 TABLET, FILM COATED ORAL at 17:00

## 2018-01-01 RX ADMIN — PROPOFOL 100 MCG/KG/MIN: 10 INJECTION, EMULSION INTRAVENOUS at 16:19

## 2018-01-01 RX ADMIN — INSULIN LISPRO 4 UNITS: 100 INJECTION, SOLUTION INTRAVENOUS; SUBCUTANEOUS at 09:06

## 2018-01-01 RX ADMIN — ALBUTEROL SULFATE 2.5 MG: 2.5 SOLUTION RESPIRATORY (INHALATION) at 09:07

## 2018-01-01 RX ADMIN — POTASSIUM CHLORIDE 40 MEQ: 20 TABLET, EXTENDED RELEASE ORAL at 09:48

## 2018-01-01 RX ADMIN — BUDESONIDE 500 MCG: 0.5 INHALANT RESPIRATORY (INHALATION) at 08:53

## 2018-01-01 RX ADMIN — INSULIN LISPRO 2 UNITS: 100 INJECTION, SOLUTION INTRAVENOUS; SUBCUTANEOUS at 16:25

## 2018-01-01 RX ADMIN — LEVETIRACETAM 750 MG: 500 TABLET ORAL at 08:53

## 2018-01-01 RX ADMIN — TIOTROPIUM BROMIDE 18 MCG: 18 CAPSULE ORAL; RESPIRATORY (INHALATION) at 08:03

## 2018-01-01 RX ADMIN — POTASSIUM CHLORIDE 40 MEQ: 20 TABLET, EXTENDED RELEASE ORAL at 09:28

## 2018-01-01 RX ADMIN — Medication 5 ML: at 14:00

## 2018-01-01 RX ADMIN — DILTIAZEM HYDROCHLORIDE 120 MG: 120 CAPSULE, COATED, EXTENDED RELEASE ORAL at 05:16

## 2018-01-01 RX ADMIN — POTASSIUM CHLORIDE 40 MEQ: 20 TABLET, EXTENDED RELEASE ORAL at 18:16

## 2018-01-01 RX ADMIN — LEVETIRACETAM 750 MG: 500 TABLET ORAL at 09:28

## 2018-01-01 RX ADMIN — POTASSIUM CHLORIDE 40 MEQ: 20 TABLET, EXTENDED RELEASE ORAL at 22:55

## 2018-01-01 RX ADMIN — PREDNISONE 20 MG: 20 TABLET ORAL at 08:53

## 2018-01-01 RX ADMIN — IPRATROPIUM BROMIDE AND ALBUTEROL SULFATE 3 ML: .5; 3 SOLUTION RESPIRATORY (INHALATION) at 01:49

## 2018-01-01 RX ADMIN — GUAIFENESIN AND DEXTROMETHORPHAN 10 ML: 100; 10 SYRUP ORAL at 16:40

## 2018-01-01 RX ADMIN — BUDESONIDE 500 MCG: 0.5 INHALANT RESPIRATORY (INHALATION) at 07:59

## 2018-01-01 RX ADMIN — INSULIN LISPRO 6 UNITS: 100 INJECTION, SOLUTION INTRAVENOUS; SUBCUTANEOUS at 12:25

## 2018-01-01 RX ADMIN — LEVETIRACETAM 750 MG: 500 TABLET ORAL at 17:22

## 2018-01-01 RX ADMIN — FUROSEMIDE 40 MG: 10 INJECTION, SOLUTION INTRAMUSCULAR; INTRAVENOUS at 09:41

## 2018-01-01 RX ADMIN — CARVEDILOL 25 MG: 25 TABLET, FILM COATED ORAL at 17:34

## 2018-01-01 RX ADMIN — MONTELUKAST SODIUM 10 MG: 10 TABLET, FILM COATED ORAL at 08:45

## 2018-01-01 RX ADMIN — ASPIRIN 81 MG 81 MG: 81 TABLET ORAL at 08:45

## 2018-01-01 RX ADMIN — DILTIAZEM HYDROCHLORIDE 10 MG: 5 INJECTION INTRAVENOUS at 14:40

## 2018-01-01 RX ADMIN — ASPIRIN 81 MG 81 MG: 81 TABLET ORAL at 09:48

## 2018-01-01 RX ADMIN — INSULIN LISPRO 4 UNITS: 100 INJECTION, SOLUTION INTRAVENOUS; SUBCUTANEOUS at 09:49

## 2018-01-01 RX ADMIN — BUDESONIDE 500 MCG: 0.5 INHALANT RESPIRATORY (INHALATION) at 20:07

## 2018-01-01 RX ADMIN — MAGNESIUM SULFATE HEPTAHYDRATE 2 G: 40 INJECTION, SOLUTION INTRAVENOUS at 10:28

## 2018-01-01 RX ADMIN — Medication 10 ML: at 13:37

## 2018-01-01 RX ADMIN — TIOTROPIUM BROMIDE 18 MCG: 18 CAPSULE ORAL; RESPIRATORY (INHALATION) at 08:01

## 2018-01-01 RX ADMIN — INSULIN LISPRO 8 UNITS: 100 INJECTION, SOLUTION INTRAVENOUS; SUBCUTANEOUS at 21:19

## 2018-01-01 RX ADMIN — FUROSEMIDE 40 MG: 10 INJECTION, SOLUTION INTRAMUSCULAR; INTRAVENOUS at 17:49

## 2018-01-01 RX ADMIN — ROSUVASTATIN CALCIUM 20 MG: 20 TABLET, FILM COATED ORAL at 23:24

## 2018-01-01 RX ADMIN — Medication 5 ML: at 23:00

## 2018-01-01 RX ADMIN — INSULIN LISPRO 6 UNITS: 100 INJECTION, SOLUTION INTRAVENOUS; SUBCUTANEOUS at 22:34

## 2018-01-01 RX ADMIN — IPRATROPIUM BROMIDE AND ALBUTEROL SULFATE 3 ML: .5; 3 SOLUTION RESPIRATORY (INHALATION) at 20:06

## 2018-01-01 RX ADMIN — INSULIN LISPRO 2 UNITS: 100 INJECTION, SOLUTION INTRAVENOUS; SUBCUTANEOUS at 22:38

## 2018-01-01 RX ADMIN — ASPIRIN 81 MG 81 MG: 81 TABLET ORAL at 09:28

## 2018-01-01 RX ADMIN — BUDESONIDE 500 MCG: 0.5 INHALANT RESPIRATORY (INHALATION) at 21:36

## 2018-01-01 RX ADMIN — Medication 10 ML: at 14:00

## 2018-01-01 RX ADMIN — Medication 2 G: at 08:34

## 2018-01-01 RX ADMIN — CARVEDILOL 25 MG: 25 TABLET, FILM COATED ORAL at 08:45

## 2018-01-01 RX ADMIN — INSULIN LISPRO 6 UNITS: 100 INJECTION, SOLUTION INTRAVENOUS; SUBCUTANEOUS at 23:23

## 2018-01-01 RX ADMIN — IPRATROPIUM BROMIDE AND ALBUTEROL SULFATE 3 ML: .5; 3 SOLUTION RESPIRATORY (INHALATION) at 02:07

## 2018-01-01 RX ADMIN — AZITHROMYCIN 250 MG: 250 TABLET, FILM COATED ORAL at 09:08

## 2018-01-01 RX ADMIN — DILTIAZEM HYDROCHLORIDE 120 MG: 120 CAPSULE, COATED, EXTENDED RELEASE ORAL at 09:08

## 2018-01-01 RX ADMIN — BUDESONIDE 500 MCG: 0.5 INHALANT RESPIRATORY (INHALATION) at 08:18

## 2018-01-01 RX ADMIN — TIOTROPIUM BROMIDE 18 MCG: 18 CAPSULE ORAL; RESPIRATORY (INHALATION) at 15:55

## 2018-01-01 RX ADMIN — IPRATROPIUM BROMIDE AND ALBUTEROL SULFATE 3 ML: .5; 3 SOLUTION RESPIRATORY (INHALATION) at 13:49

## 2018-01-01 RX ADMIN — DILTIAZEM HYDROCHLORIDE 120 MG: 120 CAPSULE, COATED, EXTENDED RELEASE ORAL at 08:35

## 2018-01-01 RX ADMIN — FUROSEMIDE 60 MG: 10 INJECTION, SOLUTION INTRAMUSCULAR; INTRAVENOUS at 11:40

## 2018-01-01 RX ADMIN — INSULIN LISPRO 6 UNITS: 100 INJECTION, SOLUTION INTRAVENOUS; SUBCUTANEOUS at 12:15

## 2018-01-01 RX ADMIN — LEVETIRACETAM 750 MG: 500 TABLET ORAL at 17:56

## 2018-01-01 RX ADMIN — SODIUM CHLORIDE, SODIUM LACTATE, POTASSIUM CHLORIDE, CALCIUM CHLORIDE: 600; 310; 30; 20 INJECTION, SOLUTION INTRAVENOUS at 16:17

## 2018-01-01 RX ADMIN — BUPIVACAINE HYDROCHLORIDE AND EPINEPHRINE 300 MG: 5; 5 INJECTION, SOLUTION EPIDURAL; INTRACAUDAL; PERINEURAL at 17:58

## 2018-01-01 RX ADMIN — IPRATROPIUM BROMIDE AND ALBUTEROL SULFATE 3 ML: .5; 3 SOLUTION RESPIRATORY (INHALATION) at 12:17

## 2018-01-01 RX ADMIN — Medication 5 ML: at 22:42

## 2018-01-01 RX ADMIN — FUROSEMIDE 80 MG: 10 INJECTION, SOLUTION INTRAMUSCULAR; INTRAVENOUS at 08:46

## 2018-01-01 RX ADMIN — INSULIN LISPRO 4 UNITS: 100 INJECTION, SOLUTION INTRAVENOUS; SUBCUTANEOUS at 07:28

## 2018-01-01 RX ADMIN — Medication 5 ML: at 21:57

## 2018-01-01 RX ADMIN — MONTELUKAST SODIUM 10 MG: 10 TABLET, FILM COATED ORAL at 08:37

## 2018-01-01 RX ADMIN — BUDESONIDE 500 MCG: 0.5 INHALANT RESPIRATORY (INHALATION) at 12:17

## 2018-01-01 RX ADMIN — POTASSIUM CHLORIDE 40 MEQ: 20 TABLET, EXTENDED RELEASE ORAL at 17:22

## 2018-01-01 RX ADMIN — ROSUVASTATIN CALCIUM 20 MG: 20 TABLET, FILM COATED ORAL at 21:55

## 2018-01-01 RX ADMIN — ROSUVASTATIN CALCIUM 20 MG: 20 TABLET, FILM COATED ORAL at 22:39

## 2018-01-01 RX ADMIN — METOPROLOL SUCCINATE 50 MG: 50 TABLET, EXTENDED RELEASE ORAL at 08:54

## 2018-01-01 RX ADMIN — MONTELUKAST SODIUM 10 MG: 10 TABLET, FILM COATED ORAL at 09:07

## 2018-01-01 RX ADMIN — METOPROLOL SUCCINATE 50 MG: 50 TABLET, EXTENDED RELEASE ORAL at 17:39

## 2018-01-01 RX ADMIN — IPRATROPIUM BROMIDE AND ALBUTEROL SULFATE 3 ML: .5; 3 SOLUTION RESPIRATORY (INHALATION) at 20:54

## 2018-01-01 RX ADMIN — ROSUVASTATIN CALCIUM 20 MG: 20 TABLET, FILM COATED ORAL at 22:35

## 2018-01-01 RX ADMIN — INSULIN LISPRO 4 UNITS: 100 INJECTION, SOLUTION INTRAVENOUS; SUBCUTANEOUS at 12:47

## 2018-01-01 RX ADMIN — Medication 5 ML: at 23:24

## 2018-01-01 RX ADMIN — IPRATROPIUM BROMIDE AND ALBUTEROL SULFATE 3 ML: .5; 3 SOLUTION RESPIRATORY (INHALATION) at 08:53

## 2018-01-01 RX ADMIN — ALBUTEROL SULFATE 2.5 MG: 2.5 SOLUTION RESPIRATORY (INHALATION) at 02:46

## 2018-01-01 RX ADMIN — IPRATROPIUM BROMIDE AND ALBUTEROL SULFATE 3 ML: .5; 3 SOLUTION RESPIRATORY (INHALATION) at 08:00

## 2018-01-01 RX ADMIN — HYDROCODONE BITARTRATE AND ACETAMINOPHEN 1 TABLET: 5; 325 TABLET ORAL at 02:34

## 2018-01-01 RX ADMIN — IOPAMIDOL 15 ML: 755 INJECTION, SOLUTION INTRAVENOUS at 15:00

## 2018-01-01 RX ADMIN — CARVEDILOL 25 MG: 25 TABLET, FILM COATED ORAL at 17:57

## 2018-01-01 RX ADMIN — METOLAZONE 5 MG: 5 TABLET ORAL at 09:28

## 2018-01-01 RX ADMIN — ASPIRIN 81 MG 81 MG: 81 TABLET ORAL at 09:41

## 2018-01-01 RX ADMIN — INSULIN LISPRO 6 UNITS: 100 INJECTION, SOLUTION INTRAVENOUS; SUBCUTANEOUS at 18:00

## 2018-01-01 RX ADMIN — CARVEDILOL 25 MG: 25 TABLET, FILM COATED ORAL at 09:40

## 2018-01-01 RX ADMIN — ASPIRIN 81 MG 81 MG: 81 TABLET ORAL at 09:08

## 2018-01-01 RX ADMIN — ASPIRIN 81 MG 81 MG: 81 TABLET ORAL at 08:35

## 2018-01-01 RX ADMIN — PREDNISONE 20 MG: 20 TABLET ORAL at 11:37

## 2018-01-01 RX ADMIN — IPRATROPIUM BROMIDE AND ALBUTEROL SULFATE 3 ML: .5; 3 SOLUTION RESPIRATORY (INHALATION) at 07:59

## 2018-01-01 RX ADMIN — MONTELUKAST SODIUM 10 MG: 10 TABLET, FILM COATED ORAL at 09:28

## 2018-01-01 RX ADMIN — INSULIN LISPRO 6 UNITS: 100 INJECTION, SOLUTION INTRAVENOUS; SUBCUTANEOUS at 08:33

## 2018-01-01 RX ADMIN — FUROSEMIDE 80 MG: 10 INJECTION, SOLUTION INTRAMUSCULAR; INTRAVENOUS at 09:26

## 2018-01-01 RX ADMIN — ROSUVASTATIN CALCIUM 20 MG: 20 TABLET, FILM COATED ORAL at 21:53

## 2018-01-01 RX ADMIN — ALBUTEROL SULFATE 2.5 MG: 2.5 SOLUTION RESPIRATORY (INHALATION) at 14:08

## 2018-01-01 RX ADMIN — METOPROLOL SUCCINATE 50 MG: 50 TABLET, EXTENDED RELEASE ORAL at 21:55

## 2018-01-01 RX ADMIN — MONTELUKAST SODIUM 10 MG: 10 TABLET, FILM COATED ORAL at 09:41

## 2018-01-01 RX ADMIN — LEVETIRACETAM 750 MG: 500 TABLET ORAL at 09:48

## 2018-01-01 RX ADMIN — INSULIN LISPRO 10 UNITS: 100 INJECTION, SOLUTION INTRAVENOUS; SUBCUTANEOUS at 16:32

## 2018-01-01 RX ADMIN — CARVEDILOL 25 MG: 25 TABLET, FILM COATED ORAL at 18:16

## 2018-01-01 RX ADMIN — HYDROCODONE BITARTRATE AND ACETAMINOPHEN 1 TABLET: 5; 325 TABLET ORAL at 08:37

## 2018-01-01 RX ADMIN — Medication 5 ML: at 06:17

## 2018-01-01 RX ADMIN — LEVETIRACETAM 750 MG: 500 TABLET ORAL at 09:08

## 2018-01-01 RX ADMIN — EPHEDRINE SULFATE 10 MG: 50 INJECTION, SOLUTION INTRAVENOUS at 16:54

## 2018-01-01 RX ADMIN — METOPROLOL SUCCINATE 50 MG: 50 TABLET, EXTENDED RELEASE ORAL at 09:07

## 2018-01-01 RX ADMIN — Medication 5 ML: at 05:17

## 2018-01-01 RX ADMIN — DILTIAZEM HYDROCHLORIDE 180 MG: 180 CAPSULE, COATED, EXTENDED RELEASE ORAL at 08:44

## 2018-01-01 RX ADMIN — CEFAZOLIN SODIUM 2 G: 1 INJECTION, POWDER, FOR SOLUTION INTRAMUSCULAR; INTRAVENOUS at 16:20

## 2018-01-01 RX ADMIN — MONTELUKAST SODIUM 10 MG: 10 TABLET, FILM COATED ORAL at 09:48

## 2018-01-01 RX ADMIN — TIOTROPIUM BROMIDE 18 MCG: 18 CAPSULE ORAL; RESPIRATORY (INHALATION) at 08:18

## 2018-01-01 RX ADMIN — ROSUVASTATIN CALCIUM 20 MG: 20 TABLET, FILM COATED ORAL at 22:56

## 2018-01-01 RX ADMIN — ALBUTEROL SULFATE 2.5 MG: 2.5 SOLUTION RESPIRATORY (INHALATION) at 15:52

## 2018-01-01 RX ADMIN — GUAIFENESIN AND DEXTROMETHORPHAN 10 ML: 100; 10 SYRUP ORAL at 11:15

## 2018-01-01 RX ADMIN — IPRATROPIUM BROMIDE AND ALBUTEROL SULFATE 3 ML: .5; 3 SOLUTION RESPIRATORY (INHALATION) at 02:01

## 2018-01-01 RX ADMIN — LEVETIRACETAM 750 MG: 500 TABLET ORAL at 08:45

## 2018-01-01 RX ADMIN — POTASSIUM CHLORIDE 40 MEQ: 20 TABLET, EXTENDED RELEASE ORAL at 09:40

## 2018-01-01 RX ADMIN — BUDESONIDE 500 MCG: 0.5 INHALANT RESPIRATORY (INHALATION) at 08:00

## 2018-01-01 RX ADMIN — CARVEDILOL 25 MG: 25 TABLET, FILM COATED ORAL at 09:48

## 2018-01-01 RX ADMIN — ASPIRIN 81 MG 81 MG: 81 TABLET ORAL at 08:54

## 2018-01-01 RX ADMIN — HYDROCODONE BITARTRATE AND ACETAMINOPHEN 1 TABLET: 5; 325 TABLET ORAL at 04:25

## 2018-01-01 RX ADMIN — LEVETIRACETAM 750 MG: 500 TABLET ORAL at 17:38

## 2018-01-01 RX ADMIN — PREDNISONE 20 MG: 20 TABLET ORAL at 08:35

## 2018-01-01 RX ADMIN — LEVETIRACETAM 750 MG: 500 TABLET ORAL at 21:55

## 2018-01-01 RX ADMIN — IPRATROPIUM BROMIDE AND ALBUTEROL SULFATE 3 ML: .5; 3 SOLUTION RESPIRATORY (INHALATION) at 20:07

## 2018-01-01 RX ADMIN — Medication 10 ML: at 06:40

## 2018-01-01 RX ADMIN — DILTIAZEM HYDROCHLORIDE 120 MG: 120 CAPSULE, COATED, EXTENDED RELEASE ORAL at 09:48

## 2018-01-01 RX ADMIN — AZITHROMYCIN 250 MG: 250 TABLET, FILM COATED ORAL at 08:34

## 2018-01-01 RX ADMIN — LEVETIRACETAM 750 MG: 500 TABLET ORAL at 18:16

## 2018-01-01 RX ADMIN — Medication 10 ML: at 18:01

## 2018-01-01 RX ADMIN — GUAIFENESIN AND DEXTROMETHORPHAN 10 ML: 100; 10 SYRUP ORAL at 15:42

## 2018-01-01 RX ADMIN — LEVETIRACETAM 750 MG: 500 TABLET ORAL at 18:51

## 2018-01-01 RX ADMIN — INSULIN LISPRO 6 UNITS: 100 INJECTION, SOLUTION INTRAVENOUS; SUBCUTANEOUS at 11:30

## 2018-01-01 RX ADMIN — NEOMYCIN AND POLYMYXIN B SULFATES: 40; 200000 IRRIGANT IRRIGATION at 17:57

## 2018-01-01 RX ADMIN — BUDESONIDE 500 MCG: 0.5 INHALANT RESPIRATORY (INHALATION) at 20:06

## 2018-01-01 RX ADMIN — Medication 5 ML: at 22:20

## 2018-01-01 RX ADMIN — FUROSEMIDE 40 MG: 40 TABLET ORAL at 09:14

## 2018-01-01 RX ADMIN — LEVETIRACETAM 750 MG: 500 TABLET ORAL at 09:41

## 2018-01-01 RX ADMIN — TUBERCULIN PURIFIED PROTEIN DERIVATIVE 5 UNITS: 5 INJECTION INTRADERMAL at 14:20

## 2018-01-01 RX ADMIN — IPRATROPIUM BROMIDE AND ALBUTEROL SULFATE 3 ML: .5; 3 SOLUTION RESPIRATORY (INHALATION) at 21:36

## 2018-01-01 RX ADMIN — PREDNISONE 20 MG: 20 TABLET ORAL at 09:08

## 2018-01-01 RX ADMIN — DILTIAZEM HYDROCHLORIDE 120 MG: 120 CAPSULE, COATED, EXTENDED RELEASE ORAL at 09:41

## 2018-01-01 RX ADMIN — Medication 2 G: at 00:23

## 2018-01-01 RX ADMIN — AZITHROMYCIN 250 MG: 250 TABLET, FILM COATED ORAL at 08:53

## 2018-01-01 RX ADMIN — METOLAZONE 5 MG: 5 TABLET ORAL at 08:45

## 2018-01-01 RX ADMIN — Medication 5 ML: at 05:07

## 2018-01-01 RX ADMIN — DILTIAZEM HYDROCHLORIDE 120 MG: 120 CAPSULE, COATED, EXTENDED RELEASE ORAL at 08:54

## 2018-01-01 RX ADMIN — SODIUM CHLORIDE 10 MG/HR: 900 INJECTION, SOLUTION INTRAVENOUS at 14:40

## 2018-01-01 RX ADMIN — GUAIFENESIN AND DEXTROMETHORPHAN 10 ML: 100; 10 SYRUP ORAL at 17:34

## 2018-01-01 RX ADMIN — ZOLPIDEM TARTRATE 5 MG: 5 TABLET ORAL at 22:39

## 2018-01-01 RX ADMIN — ALBUTEROL SULFATE 2.5 MG: 2.5 SOLUTION RESPIRATORY (INHALATION) at 11:43

## 2018-01-01 RX ADMIN — IPRATROPIUM BROMIDE AND ALBUTEROL SULFATE 3 ML: .5; 3 SOLUTION RESPIRATORY (INHALATION) at 14:39

## 2018-01-01 RX ADMIN — Medication 5 ML: at 16:29

## 2018-01-01 RX ADMIN — LEVETIRACETAM 750 MG: 500 TABLET ORAL at 17:34

## 2018-01-01 RX ADMIN — BUDESONIDE 500 MCG: 0.5 INHALANT RESPIRATORY (INHALATION) at 20:54

## 2018-01-01 RX ADMIN — ROSUVASTATIN CALCIUM 20 MG: 20 TABLET, FILM COATED ORAL at 21:18

## 2018-01-01 RX ADMIN — IPRATROPIUM BROMIDE AND ALBUTEROL SULFATE 3 ML: .5; 3 SOLUTION RESPIRATORY (INHALATION) at 08:18

## 2018-01-01 RX ADMIN — ALBUTEROL SULFATE 2.5 MG: 2.5 SOLUTION RESPIRATORY (INHALATION) at 08:03

## 2018-01-01 RX ADMIN — CARVEDILOL 25 MG: 25 TABLET, FILM COATED ORAL at 09:28

## 2018-01-01 RX ADMIN — INSULIN LISPRO 6 UNITS: 100 INJECTION, SOLUTION INTRAVENOUS; SUBCUTANEOUS at 21:51

## 2018-01-01 RX ADMIN — FUROSEMIDE 80 MG: 10 INJECTION, SOLUTION INTRAMUSCULAR; INTRAVENOUS at 09:49

## 2018-01-01 RX ADMIN — FUROSEMIDE 80 MG: 10 INJECTION, SOLUTION INTRAMUSCULAR; INTRAVENOUS at 17:33

## 2018-01-01 RX ADMIN — TIOTROPIUM BROMIDE 18 MCG: 18 CAPSULE ORAL; RESPIRATORY (INHALATION) at 08:58

## 2018-01-01 RX ADMIN — BUDESONIDE 500 MCG: 0.5 INHALANT RESPIRATORY (INHALATION) at 09:06

## 2018-01-01 RX ADMIN — ALBUTEROL SULFATE 2.5 MG: 2.5 SOLUTION RESPIRATORY (INHALATION) at 23:05

## 2018-01-01 RX ADMIN — Medication 5 ML: at 21:52

## 2018-01-01 RX ADMIN — PERFLUTREN 1 ML: 6.52 INJECTION, SUSPENSION INTRAVENOUS at 10:00

## 2018-01-01 RX ADMIN — LEVETIRACETAM 750 MG: 500 TABLET ORAL at 08:34

## 2018-01-01 RX ADMIN — METOPROLOL SUCCINATE 100 MG: 100 TABLET, EXTENDED RELEASE ORAL at 09:15

## 2018-01-01 RX ADMIN — MONTELUKAST SODIUM 10 MG: 10 TABLET, FILM COATED ORAL at 08:53

## 2018-01-01 RX ADMIN — FUROSEMIDE 80 MG: 10 INJECTION, SOLUTION INTRAMUSCULAR; INTRAVENOUS at 18:16

## 2018-01-01 RX ADMIN — Medication 5 ML: at 06:08

## 2018-01-01 RX ADMIN — Medication 5 ML: at 21:20

## 2018-01-01 RX ADMIN — AZITHROMYCIN 250 MG: 250 TABLET, FILM COATED ORAL at 11:37

## 2018-03-01 PROBLEM — J44.1 COPD WITH ACUTE EXACERBATION (HCC): Status: ACTIVE | Noted: 2018-01-01

## 2018-03-01 PROBLEM — I48.91 AFIB (HCC): Status: ACTIVE | Noted: 2018-01-01

## 2018-03-01 PROBLEM — N18.30 TYPE 2 DIABETES MELLITUS WITH STAGE 3 CHRONIC KIDNEY DISEASE, WITHOUT LONG-TERM CURRENT USE OF INSULIN (HCC): Status: ACTIVE | Noted: 2017-01-01

## 2018-03-01 PROBLEM — E11.22 TYPE 2 DIABETES MELLITUS WITH STAGE 3 CHRONIC KIDNEY DISEASE, WITHOUT LONG-TERM CURRENT USE OF INSULIN (HCC): Status: ACTIVE | Noted: 2017-01-01

## 2018-03-01 PROBLEM — N18.30 STAGE 3 CHRONIC KIDNEY DISEASE (HCC): Chronic | Status: ACTIVE | Noted: 2018-01-01

## 2018-03-01 PROBLEM — I48.92 ATRIAL FLUTTER (HCC): Status: ACTIVE | Noted: 2018-01-01

## 2018-03-01 PROBLEM — E11.22 TYPE 2 DIABETES MELLITUS WITH STAGE 3 CHRONIC KIDNEY DISEASE, WITHOUT LONG-TERM CURRENT USE OF INSULIN (HCC): Chronic | Status: ACTIVE | Noted: 2017-01-01

## 2018-03-01 PROBLEM — D69.6 THROMBOCYTOPENIA (HCC): Chronic | Status: ACTIVE | Noted: 2018-01-01

## 2018-03-01 PROBLEM — N18.30 TYPE 2 DIABETES MELLITUS WITH STAGE 3 CHRONIC KIDNEY DISEASE, WITHOUT LONG-TERM CURRENT USE OF INSULIN (HCC): Chronic | Status: ACTIVE | Noted: 2017-01-01

## 2018-03-01 PROBLEM — J96.01 ACUTE RESPIRATORY FAILURE WITH HYPOXIA (HCC): Status: ACTIVE | Noted: 2018-01-01

## 2018-03-01 PROBLEM — E11.9 TYPE 2 DIABETES MELLITUS (HCC): Status: ACTIVE | Noted: 2017-01-01

## 2018-03-01 PROBLEM — D69.6 THROMBOCYTOPENIA (HCC): Status: ACTIVE | Noted: 2018-01-01

## 2018-03-01 PROBLEM — R79.89 LFT ELEVATION: Status: ACTIVE | Noted: 2018-01-01

## 2018-03-01 PROBLEM — I50.43 ACUTE ON CHRONIC COMBINED SYSTOLIC AND DIASTOLIC CHF (CONGESTIVE HEART FAILURE) (HCC): Status: ACTIVE | Noted: 2018-01-01

## 2018-03-01 PROBLEM — I62.03 CHRONIC SUBDURAL HEMATOMA (HCC): Status: ACTIVE | Noted: 2018-01-01

## 2018-03-01 NOTE — CONSULTS
[facility] Cardiology Consult    Attending Cardiologist:Dr. Benson Griffin    Primary Cardiologist:Dr. Shahram Griffin [facility]    Primary Care Physician:Dr. Amie Castleman     Subjective:     Meeta Arredondo is a 69 y.o. male with known hx of ICM, Prior CABG on two separate occassions,  chronic SHF, chronic subdural hematoma--with required neurosurgery, with frequent falls in past. He has documented Hx of EF 30-35 % by echo in Sept 2016. He presented to ER today with complaints of worsening SOB, lower ext edema for last week with white frothy cough. His BNP was elevated at 2400. We are asked to see pt for HF management. He states that he has been compliant with taking his medications. Denies ETOH, substance use or tobacco abuse. ECG today appears to be atrial flutter with  bpm. He was unaware of his fast HR. He states today with exertion he felt near syncopal. Reviewing last ECG in system notes NSR in Sept 2017.      Past Medical History:   Diagnosis Date    Asthma     CAD (coronary artery disease)     Chronic subdural hematoma ([address] 3/1/2018    COPD     Diabetes ([facility] [address]     Endocrine disease     Gastrointestinal disorder     Heart failure ([address] [address]     Hypertension     Other ill-defined conditions(799.89)     elevated cholersterol    PUD (peptic ulcer disease)       Past Surgical History:   Procedure Laterality Date    CARDIAC SURG PROCEDURE UNLIST      bipass surgery      Current Facility-Administered Medications   Medication Dose Route Frequency    albuterol (PROVENTIL VENTOLIN) nebulizer solution 2.5 mg  2.5 mg Nebulization Q8H    ALPRAZolam (XANAX) tablet 0.25 mg  0.25 mg Oral BID PRN    [START ON 3/2/2018] amiodarone (CORDARONE) tablet 200 mg  200 mg Oral DAILY    [START ON 3/2/2018] aspirin chewable tablet 81 mg  81 mg Oral DAILY    carvedilol (COREG) tablet 25 mg  25 mg Oral BID WITH MEALS    levETIRAcetam (KEPPRA) tablet 750 mg  750 mg Oral BID    [START ON 3/2/2018] montelukast (SINGULAIR) tablet 10 mg  10 mg Oral DAILY    rosuvastatin (CRESTOR) tablet 20 mg  20 mg Oral QHS    [START ON 3/2/2018] tiotropium (SPIRIVA) inhalation capsule 18 mcg  1 Cap Inhalation DAILY    bisacodyl (DULCOLAX) tablet 5 mg  5 mg Oral DAILY PRN    guaiFENesin-dextromethorphan (ROBITUSSIN DM) 100-10 mg/5 mL syrup 10 mL  10 mL Oral Q4H PRN    hydrALAZINE (APRESOLINE) 20 mg/mL injection 20 mg  20 mg IntraVENous Q6H PRN    polyethylene glycol (MIRALAX) packet 17 g  17 g Oral DAILY PRN    simethicone (MYLICON) tablet 80 mg  80 mg Oral QID PRN    furosemide (LASIX) injection 40 mg  40 mg IntraVENous BID    sodium chloride (NS) flush 5-10 mL  5-10 mL IntraVENous Q8H    sodium chloride (NS) flush 5-10 mL  5-10 mL IntraVENous PRN    acetaminophen (TYLENOL) tablet 650 mg  650 mg Oral Q4H PRN    HYDROcodone-acetaminophen (NORCO) 5-325 mg per tablet 1 Tab  1 Tab Oral Q4H PRN    naloxone (NARCAN) injection 0.4 mg  0.4 mg IntraVENous PRN    diphenhydrAMINE (BENADRYL) injection 25 mg  25 mg IntraVENous Q4H PRN    ondansetron (ZOFRAN) injection 4 mg  4 mg IntraVENous Q4H PRN    senna-docusate (PERICOLACE) 8.6-50 mg per tablet 2 Tab  2 Tab Oral DAILY PRN    LORazepam (ATIVAN) tablet 1 mg  1 mg Oral Q4H PRN    zolpidem (AMBIEN) tablet 5 mg  5 mg Oral QHS PRN    insulin lispro (HUMALOG) injection   SubCUTAneous AC&HS    dextrose 40% (GLUTOSE) oral gel 1 Tube  15 g Oral PRN    glucagon (GLUCAGEN) injection 1 mg  1 mg IntraMUSCular PRN    dextrose (D50W) injection syrg 12.5-25 g  25-50 mL IntraVENous PRN    heparin (porcine) injection 5,000 Units  5,000 Units SubCUTAneous Q8H    albuterol (PROVENTIL VENTOLIN) nebulizer solution 2.5 mg  2.5 mg Nebulization Q4H PRN     Current Outpatient Prescriptions   Medication Sig    amiodarone (CORDARONE) 200 mg tablet Take 200 mg by mouth.  levETIRAcetam (KEPPRA) 750 mg tablet Take 750 mg by mouth two (2) times a day.     tiotropium (SPIRIVA WITH HANDIHALER) 18 mcg inhalation capsule Take 1 Cap by inhalation daily.  rosuvastatin (CRESTOR) 20 mg tablet Take 20 mg by mouth nightly.  carvedilol (COREG) 25 mg tablet Take 1 tablet by mouth two (2) times daily (with meals).  aspirin 81 mg chewable tablet Take 1 tablet by mouth daily.  losartan (COZAAR) 50 mg tablet Take 1 tablet by mouth daily.  montelukast (SINGULAIR) 10 mg tablet Take 10 mg by mouth daily.  nitroglycerin (NITROSTAT) 0.4 mg SL tablet by SubLINGual route every five (5) minutes as needed for Chest Pain.  albuterol (VENTOLIN HFA) 90 mcg/actuation inhaler Take  by inhalation as needed for Wheezing.  ALPRAZolam (XANAX) 0.25 mg tablet Take 0.25 mg by mouth two (2) times daily as needed for Anxiety.  furosemide (LASIX) 40 mg tablet Take 40 mg by mouth two (2) times a day. 20 mg in am, 40 mg hs  Indications: takes 1/2 pill on saturday and sunday, then 40 mg rest of the week.  glimepiride (AMARYL) 2 mg tablet Take 2 mg by mouth every morning. Allergies   Allergen Reactions    Mylanta [Aluminum-Magnesium Hydroxide] Rash      Social History   Substance Use Topics    Smoking status: Former Smoker     Packs/day: 1.50     Years: 40.00     Quit date: 1/1/1998    Smokeless tobacco: Never Used      Comment: quit 1998    Alcohol use No      No family history on file. Review of Systems  Gen: Denies fever, chills, malaise or fatigue. Appetite good. HEENT: Denies frequent headaches, dizzyness, visual disturbances, Neck pain or swallowing difficulty  Lungs: as above   Cardiovascular: as above, no cp  GI: hx of GIB, denies recent   : Denies dysuria, no complaints of frequency, nocturia  Heme: No prior bleeding disorders, no prior Cancer  Neuro: as above, denies recent dizziness   Endocrine: no diabetes, thyroid disorders  Psychiatric: Denies anxiety, or other psychiatric illnesses.      Objective:     Visit Vitals    /84 (BP 1 Location: Right arm, BP Patient Position: At rest)    Pulse (!) 124    Temp 97.6 °F (36.4 °C)    Resp 20    Ht 6' 3\" (1.905 m)    Wt 95.3 kg (210 lb)    SpO2 94%    BMI 26.25 kg/m2     General:Alert, cooperative, no distress, appears stated age  Head: Normocephalic, without obvious abnormality, atraumatic. Eyes: Conjunctivae/corneas clear. PERRL, EOMs intact  Nose:Nares normal. Septum midline. Mucosa normal. No drainage or sinus tenderness. Throat: Lips, mucosa, and tongue normal. Teeth and gums normal.   Neck: Supple, symmetrical, trachea midline,  no carotid bruit and no JVD. Lungs:Clear to auscultation bilaterally. Chest wall: No tenderness or deformity. Heart: tachycardic, regular    Abdomen:Soft, non-tender. Bowel sounds normal. No masses, No organomegaly. Extremities: 2+ pitting   Pulses: 2+ and symmetric all extremities. Skin: Skin color, texture, turgor normal. No rashes or lesions  Lymph nodes: Cervical, supraclavicular, and axillary nodes normal  Neurologic:No focal deficits identified                 ECG: atrial flutter, RVR     Data Review:     Recent Results (from the past 24 hour(s))   TROPONIN I    Collection Time: 03/01/18  9:49 AM   Result Value Ref Range    Troponin-I, Qt. <0.02 (L) 0.02 - 0.05 NG/ML   CBC WITH AUTOMATED DIFF    Collection Time: 03/01/18  9:49 AM   Result Value Ref Range    WBC 5.6 4.3 - 11.1 K/uL    RBC 4.84 4.23 - 5.67 M/uL    HGB 12.1 (L) 13.6 - 17.2 g/dL    HCT 36.5 (L) 41.1 - 50.3 %    MCV 75.4 (L) 79.6 - 97.8 FL    MCH 25.0 (L) 26.1 - 32.9 PG    MCHC 33.2 31.4 - 35.0 g/dL    RDW 15.2 (H) 11.9 - 14.6 %    PLATELET 985 (L) 452 - 450 K/uL    MPV Cannot be calculated 10.8 - 14.1 FL    DF AUTOMATED      NEUTROPHILS 49 43 - 78 %    LYMPHOCYTES 35 13 - 44 %    MONOCYTES 14 (H) 4.0 - 12.0 %    EOSINOPHILS 1 0.5 - 7.8 %    BASOPHILS 1 0.0 - 2.0 %    IMMATURE GRANULOCYTES 0 0.0 - 5.0 %    ABS. NEUTROPHILS 2.7 1.7 - 8.2 K/UL    ABS. LYMPHOCYTES 2.0 0.5 - 4.6 K/UL    ABS. MONOCYTES 0.8 0.1 - 1.3 K/UL    ABS. EOSINOPHILS 0.0 0.0 - 0.8 K/UL    ABS.  BASOPHILS 0.0 0.0 - 0.2 K/UL    ABS. IMM. GRANS. 0.0 0.0 - 0.5 K/UL   METABOLIC PANEL, COMPREHENSIVE    Collection Time: 03/01/18  9:49 AM   Result Value Ref Range    Sodium 142 136 - 145 mmol/L    Potassium 3.7 3.5 - 5.1 mmol/L    Chloride 108 (H) 98 - 107 mmol/L    CO2 27 21 - 32 mmol/L    Anion gap 7 7 - 16 mmol/L    Glucose 173 (H) 65 - 100 mg/dL    BUN 17 8 - 23 MG/DL    Creatinine 1.63 (H) 0.8 - 1.5 MG/DL    GFR est AA 53 (L) >60 ml/min/1.73m2    GFR est non-AA 44 (L) >60 ml/min/1.73m2    Calcium 8.7 8.3 - 10.4 MG/DL    Bilirubin, total 1.4 (H) 0.2 - 1.1 MG/DL    ALT (SGPT) 110 (H) 12 - 65 U/L    AST (SGOT) 90 (H) 15 - 37 U/L    Alk. phosphatase 135 50 - 136 U/L    Protein, total 7.1 6.3 - 8.2 g/dL    Albumin 3.7 3.2 - 4.6 g/dL    Globulin 3.4 2.3 - 3.5 g/dL    A-G Ratio 1.1 (L) 1.2 - 3.5     BNP    Collection Time: 03/01/18  9:49 AM   Result Value Ref Range    BNP 2443 pg/mL   EKG, 12 LEAD, INITIAL    Collection Time: 03/01/18 11:04 AM   Result Value Ref Range    Ventricular Rate 124 BPM    Atrial Rate 248 BPM    QRS Duration 112 ms    Q-T Interval 352 ms    QTC Calculation (Bezet) 505 ms    Calculated R Axis -21 degrees    Calculated T Axis 160 degrees    Diagnosis       Atrial flutter with 2:1 A-V conduction  Inferior infarct , age undetermined  Cannot rule out Anterior infarct , age undetermined  Abnormal ECG  When compared with ECG of 01-JUL-2017 12:29,  Significant changes have occurred           Assessment / Plan     Principal Problem:    Acute on chronic combined systolic and diastolic CHF (congestive heart failure) ([address] (3/1/2018)--agree with IV diuresis. Aflutter contributing to scenario. EF in past 30 %. Echo.  Rate control with IV cardizem    Active Problems:    HTN (hypertension) (12/14/2014)--stable at present       Type 2 diabetes mellitus with stage 3 chronic kidney disease, without long-term current use of insulin ([facility] (7/1/2017)      COPD (chronic obstructive pulmonary disease) ([facility] (7/1/2017) CAD (coronary artery disease) (12/14/2014)--denies CP       Combined systolic and diastolic congestive heart failure ([facility] (7/1/2017)      Ischemic cardiomyopathy (7/1/2017)      COPD with acute exacerbation ([facility] (3/1/2018)      Stage 3 chronic kidney disease (3/1/2018)      Acute respiratory failure with hypoxia (HCC) (3/1/2018)      LFT elevation (3/1/2018)      Thrombocytopenia (HCC) (3/1/2018)      Chronic subdural hematoma (HCC) (3/1/2018)      Atrial flutter (HCC) (3/1/2018)--rate control only, not candidate for anticoagulation given chronic SDH, on amiodarone at home. Consider stopping given he cannot take OA.                Emile Velazquez, NP

## 2018-03-01 NOTE — IP AVS SNAPSHOT
[address] 
[phone] Patient: Carly Enriquez MRN: [id number] FVD:[id number]/[birth year]/0759 A check adrienne indicates which time of day the medication should be taken. My Medications START taking these medications Instructions Each Dose to Equal  
 Morning Noon Evening Bedtime  
 azithromycin 250 mg tablet Commonly known as:  Manuel Malhotra Your next dose is:  3-9-2018 Take 1 Tab by mouth daily for 3 days. 250 mg  
    
  
   
   
   
  
 fluticasone-salmeterol 250-50 mcg/dose diskus inhaler Commonly known as:  ADVAIR Take 1 Puff by inhalation two (2) times a day. 1 Puff  
    
   
   
   
  
 lisinopril 2.5 mg tablet Commonly known as:  Quarles Manish Your next dose is:  3-9-2018 Take 1 Tab by mouth daily. 2.5 mg  
    
  
   
   
   
  
 metoprolol succinate 100 mg tablet Commonly known as:  TOPROL-XL Your next dose is:  3-8-2018 Take 1 Tab by mouth two (2) times a day. 100 mg CHANGE how you take these medications Instructions Each Dose to Equal  
 Morning Noon Evening Bedtime  
 furosemide 40 mg tablet Commonly known as:  LASIX What changed:   
- when to take this 
- additional instructions Your next dose is:  3-9-02719 Take 1 Tab by mouth daily. Indications: takes 1/2 pill on saturday and sunday, then 40 mg rest of the week. 40 mg CONTINUE taking these medications Instructions Each Dose to Equal  
 Morning Noon Evening Bedtime  
 aspirin 81 mg chewable tablet Your last dose was:  3-8-2018 Take 1 tablet by mouth daily. 81 mg  
    
  
   
   
   
  
 glimepiride 2 mg tablet Commonly known as:  AMARYL Your next dose is:  3-9-2018 Take 2 mg by mouth every morning. 2 mg KLOR-CON 10 10 mEq tablet Generic drug:  potassium chloride SR Your next dose is:  3-8-2018 Take 20 mEq by mouth two (2) times a day. 20 mEq  
    
   
   
  
   
  
 levETIRAcetam 750 mg tablet Commonly known as:  KEPPRA Your next dose is:  3-8-2018 Take 750 mg by mouth two (2) times a day. 750 mg  
    
   
   
  
   
  
 levothyroxine 75 mcg tablet Commonly known as:  SYNTHROID Your next dose is:  3-9-2018 Take 75 mcg by mouth Daily (before breakfast). 75 mcg  
    
  
   
   
   
  
 nitroglycerin 0.4 mg SL tablet Commonly known as:  NITROSTAT  
   
 by SubLINGual route every five (5) minutes as needed for Chest Pain. rosuvastatin 20 mg tablet Commonly known as:  CRESTOR Your next dose is:  3-8-2018 Take 20 mg by mouth nightly. 20 mg  
    
   
   
   
  
  
 SINGULAIR 10 mg tablet Generic drug:  montelukast  
Your next dose is:  3-9-2018 Take 10 mg by mouth daily. 10 mg  
    
  
   
   
   
  
 tiotropium 18 mcg inhalation capsule Commonly known as:  [address] Your next dose is:  3-9-2018 Take 1 Cap by inhalation daily. 1 Cap VENTOLIN HFA 90 mcg/actuation inhaler Generic drug:  albuterol Take  by inhalation as needed for Wheezing. XANAX 0.25 mg tablet Generic drug:  ALPRAZolam  
   
 Take 0.25 mg by mouth two (2) times daily as needed for Anxiety. 0.25 mg  
    
   
   
   
  
  
STOP taking these medications   
 amiodarone 200 mg tablet Commonly known as:  CORDARONE  
   
  
 carvedilol 25 mg tablet Commonly known as:  COREG  
   
  
 losartan 50 mg tablet Commonly known as:  COZAAR Where to Get Your Medications Information on where to get these meds will be given to you by the nurse or doctor. ! Ask your nurse or doctor about these medications  
  azithromycin 250 mg tablet fluticasone-salmeterol 250-50 mcg/dose diskus inhaler  
 furosemide 40 mg tablet  
 lisinopril 2.5 mg tablet  
 metoprolol succinate 100 mg tablet

## 2018-03-01 NOTE — ED PROVIDER NOTES
Patient is a 69 y.o. male presenting with shortness of breath. The history is provided by the patient. Shortness of Breath   This is a new problem. The average episode lasts 2 weeks. The problem occurs continuously. The current episode started more than 1 week ago. The problem has been gradually worsening. Associated symptoms include cough, sputum production (white), wheezing, orthopnea and leg swelling (since yesterday). Pertinent negatives include no fever, no headaches, no coryza, no rhinorrhea, no sore throat, no swollen glands, no ear pain, no neck pain, no hemoptysis, no PND, no chest pain, no syncope, no vomiting, no abdominal pain, no rash, no leg pain and no claudication. It is unknown what precipitated the problem. Treatments tried: patient got a DuoNeb therapy as well as oxygen via EMS moderate improvement. The treatment provided moderate relief. He has had prior hospitalizations. He has had prior ED visits. He has had no prior ICU admissions. Associated medical issues include asthma, COPD, CAD and heart failure. Associated medical issues do not include pneumonia, chronic lung disease, PE, past MI, DVT or recent surgery. Past Medical History:   Diagnosis Date    Asthma     CAD (coronary artery disease)     COPD     Diabetes ([facility] [address]     Endocrine disease     Gastrointestinal disorder     Heart failure ([facility] [address]     Hypertension     Other ill-defined conditions(799.89)     elevated cholersterol    PUD (peptic ulcer disease)        Past Surgical History:   Procedure Laterality Date    CARDIAC SURG PROCEDURE UNLIST      bipass surgery         No family history on file. Social History     Social History    Marital status:      Spouse name: N/A    Number of children: N/A    Years of education: N/A     Occupational History    Not on file.      Social History Main Topics    Smoking status: Former Smoker     Packs/day: 1.50     Years: 40.00     Quit date: 1/1/1998    Smokeless tobacco: Never Used      Comment: quit 1998    Alcohol use No    Drug use: No    Sexual activity: Not on file     Other Topics Concern    Not on file     Social History Narrative    Patient lives alone    Previously employed in cleaning         ALLERGIES: Mylanta [aluminum-magnesium hydroxide]    Review of Systems   Constitutional: Negative for chills and fever. HENT: Negative for ear pain, rhinorrhea and sore throat. Respiratory: Positive for cough, sputum production (white), shortness of breath and wheezing. Negative for hemoptysis. Cardiovascular: Positive for orthopnea and leg swelling (since yesterday). Negative for chest pain, claudication, syncope and PND. Gastrointestinal: Negative for abdominal pain and vomiting. Musculoskeletal: Negative for neck pain. Skin: Negative for rash. Neurological: Negative for headaches. All other systems reviewed and are negative. Vitals:    03/01/18 0940 03/01/18 1110   BP: 132/88    Pulse: (!) 123 (!) 123   Resp: 20    Temp: 97.6 °F (36.4 °C)    SpO2: 96% 95%   Weight: 95.3 kg (210 lb)    Height: 6' 3\" (1.905 m)             Physical Exam   Constitutional: He is oriented to person, place, and time. He appears well-developed and well-nourished. No distress. HENT:   Head: Normocephalic and atraumatic. Right Ear: Tympanic membrane and external ear normal.   Left Ear: Tympanic membrane and external ear normal.   Mouth/Throat: Oropharynx is clear and moist.   Eyes: Conjunctivae and EOM are normal. Pupils are equal, round, and reactive to light. Neck: Normal range of motion. Neck supple. No tracheal deviation present. Cardiovascular: Normal rate, regular rhythm, normal heart sounds and intact distal pulses. Exam reveals no gallop and no friction rub. No murmur heard. Pulmonary/Chest: Effort normal. No respiratory distress. He has wheezes (mild and diffuse). He has rales (mild bases). He exhibits no tenderness. Abdominal: Soft.  Bowel sounds are normal. He exhibits no distension and no mass. There is no hepatosplenomegaly. There is no tenderness. There is no rebound and no guarding. Musculoskeletal: Normal range of motion. He exhibits edema (to 3+ bilateral lower extremity swelling slightly more on the left than the right which patient states is chronic for him). Lymphadenopathy:     He has no cervical adenopathy. Neurological: He is alert and oriented to person, place, and time. No cranial nerve deficit. Skin: Skin is warm and dry. No rash noted. He is not diaphoretic. No erythema. Psychiatric: He has a normal mood and affect. Nursing note and vitals reviewed. MDM  Number of Diagnoses or Management Options  Acute on chronic combined systolic and diastolic CHF (congestive heart failure) ([facility] [address]): new and requires workup  COPD with acute exacerbation [facility]): new and requires workup  Stage 3 chronic kidney disease: established and worsening     Amount and/or Complexity of Data Reviewed  Clinical lab tests: ordered and reviewed  Tests in the radiology section of CPT®: ordered and reviewed  Decide to obtain previous medical records or to obtain history from someone other than the patient: yes  Review and summarize past medical records: yes (           [facility], [address] - CATH LAB REPORT     NAME:  Umesh Gregory                            MR:  [id number]  LOC:  [id number]            SEX:  Alison Labor:  [de-identified]  :  1940            AGE:  69              PT:  V  ADMIT:  2014          DSCH:                 ANNE:           PRIMARY CARDIOLOGIST: Pankaj Gamino.  Adilene Kilgore MD     BRIEF HISTORY: The patient is a 61-year-old gentleman recently admitted  with unstable angina, complicated past medical history of coronary artery  disease, coronary artery bypass graft on two separate occasions, last in  2003, with vein graft to the LAD, vein graft to the obtuse marginal, vein  graft to the ramus intermedius. He is admitted with unstable angina for  cardiac catheterization.     PROCEDURES  1. Site-Rite right femoral artery access. 2. Left heart catheterization. 3. Saphenous vein graft injections x3.  4. Aortogram.  5. Left ventricular pressure measurements. 6. Percutaneous coronary intervention and stenting of the left main and  left circumflex.     PROCEDURE: After informed consent, he was prepped and draped in the usual  sterile fashion. The right groin was anesthetized with lidocaine. Utilizing the Site-Rite ultrasound machine, the common femoral artery was  identified and images put in permanent record. Following this, the common  femoral artery was accessed under ultrasound guidance and a 6-Central African  sheath was advanced without complications. A 6-[place] African JL4 and JR4 were  utilized for left and right coronary injections. Emerson An was utilized for  saphenous vein graft injections of the LAD as well as saphenous vein  graft injections of the ramus intermedius. An AL1 documented several  occluded vein graft origins. The patient has had 2 prior bypass  surgeries. Following this, a thoracic aortogram was utilized to confirm  no additional grafts seen. Following this, PCI and stenting of the left  main into the left circumflex was performed.     CONTRAST: Optiray.     FINDINGS  1. Aortogram: Aortogram demonstrates normal-sized aorta. There were 2  visualized vein grafts patent. No other vein grafts were identified. 2. Left ventricle: Left ventriculogram not obtained. Left ventricular  end-diastolic pressure measures 14 mmHg. There was no aortic valve  gradient. 3. Left main: The left main is moderate in size. He has a 95% distal left  main stenosis as it extends into the left circumflex. The LAD and ramus  intermedius are occluded off the left main.   4. Left anterior descending coronary: Occluded. 5. Ramus intermedius coronary artery: Occluded. 6. Left circumflex coronary artery: 99% ostial left circumflex into the  distal left main. It gives rise to 2 obtuse marginals which are  relatively small but free of significant disease. 7. Right coronary: This is a relatively large vessel. It gives rise to  posterior descending and posterolateral branches. There is no significant  disease within the right coronary artery. 8. Saphenous vein graft to the left anterior descending: This is a large  graft. Good proximal takeoff. Good distal anastomosis. Fills the entire  LAD system. There is no other significant disease identified within the  left anterior descending vessel. 9. Saphenous vein graft to the ramus intermedius: This is a large graft,  good proximal takeoff, good distal anastomosis and fills a large  bifurcating ramus system.     PERCUTANEOUS CORONARY INTERVENTION     LESION: Left main     Prestenosis 99%. Poststenosis 0%.     DETAILS: The patient was anticoagulated with Angiomax. A 6-Jamaican XB 3.5  guide was utilized. A Whisper Extra-Support wire was advanced into the  second obtuse marginal. A 2.5 x 12 noncompliant TREK balloon was utilized  for predilatation followed by stenting with a 3.0 x 15 Xience Alpine  drug-eluting stent postdilated to high pressure with a 3.5 x 8 mm  noncompliant balloon. This resulted in excellent angiographic result. The  wire was removed and orthogonal views were obtained.     CONCLUSIONS  1. Left ventriculogram not obtained. 2. Two of three vein grafts patent with patent vein graft to the left  anterior descending and patent vein graft to the ramus intermedius. 3. Occluded vein graft to the obtuse marginal in a patient with severe  distal left main and ostial left circumflex disease status post  reconstruction of the distal left main into the left circumflex with  Xience Alpine drug-eluting stent.   4. No significant right coronary artery stenosis.     The patient's ejection fraction appears reduced and will need  echocardiogram during hospitalization. Result Date - 2014   Narrative   Tanner Nagy [address], [address] [facility]  [phone]    Transthoracic Echocardiogram  2D, M-mode, Doppler, and Color Doppler    Patient: David Luu  MR #: [id number]  : [id number]  Age: 69 years  Gender: Male  Study date: 16-Dec-2014  Account #: [de-identified]  Height: 75 in  Weight: 209.7 lb  BSA: 2.24 m squared  Status:Routine  Location: [facility]  BP: 153/ 67    Allergies: ALUMINUM-MAGNESIUM HYDROXIDE    Sonographer: ARCENIO Noonan  Group:  [facility]  Referring Physician: Giovanna Zhang. Edenilson Patton MD  Reading Physician: Giovanna Zhang. Edenilson Patton MD    INDICATIONS: CAD    PROCEDURE: This was a routine study. A transthoracic echocardiogram was  performed. The study included complete 2D imaging, M-mode, complete spectral  Doppler, and color Doppler. Image quality was adequate. LEFT VENTRICLE: Size was normal. Systolic function was mildly reduced. Ejection  fraction was estimated in the range of 30 % to 35 %. There was mild diffuse  hypokinesis. There was hypokinesis of the entire anterior wall(s). Inferior  wall hypokinesis. Wall thickness was normal. Doppler parameters were   consistent  with mild diastolic dysfunction (grade 1). RIGHT VENTRICLE: The size was normal. Systolic function was normal. The  tricuspid jet envelope definition was inadequate for estimation of RV   systolic  pressure. LEFT ATRIUM: The atrium was mildly to moderately dilated. RIGHT ATRIUM: Size was normal.    SYSTEMIC VEINS: IVC: The inferior vena cava was normal in size and course. AORTIC VALVE: The valve was structurally normal, tri-commissural. There was   no  evidence for stenosis. There was no insufficiency. MITRAL VALVE: Valve structure was normal. There was no evidence for stenosis.   There was mild regurgitation. TRICUSPID VALVE: The valve structure was normal. There was no evidence for  stenosis. There was no regurgitation. PULMONIC VALVE: The valve structure was normal. There was no evidence for  stenosis. There was no insufficiency. PERICARDIUM: There was no pericardial effusion. AORTA: The root exhibited normal size. SUMMARY:    -  Left ventricle: Systolic function was mildly reduced. Ejection fraction   was  estimated in the range of 30 % to 35 %. There was mild diffuse hypokinesis. There was hypokinesis of the entire anterior wall(s). Inferior wall  hypokinesis. Doppler parameters were consistent with mild diastolic   dysfunction  (grade 1). -  Left atrium: The atrium was mildly to moderately dilated. -  Mitral valve: There was mild regurgitation.      )  Independent visualization of images, tracings, or specimens: yes    Risk of Complications, Morbidity, and/or Mortality  Presenting problems: high  Diagnostic procedures: moderate  Management options: high    Patient Progress  Patient progress: stable        ED Course       Procedures      The patient was observed in the ED. Results Reviewed:      Recent Results (from the past 24 hour(s))   TROPONIN I    Collection Time: 03/01/18  9:49 AM   Result Value Ref Range    Troponin-I, Qt. <0.02 (L) 0.02 - 0.05 NG/ML   CBC WITH AUTOMATED DIFF    Collection Time: 03/01/18  9:49 AM   Result Value Ref Range    WBC 5.6 4.3 - 11.1 K/uL    RBC 4.84 4.23 - 5.67 M/uL    HGB 12.1 (L) 13.6 - 17.2 g/dL    HCT 36.5 (L) 41.1 - 50.3 %    MCV 75.4 (L) 79.6 - 97.8 FL    MCH 25.0 (L) 26.1 - 32.9 PG    MCHC 33.2 31.4 - 35.0 g/dL    RDW 15.2 (H) 11.9 - 14.6 %    PLATELET 555 (L) 694 - 450 K/uL    MPV Cannot be calculated 10.8 - 14.1 FL    DF AUTOMATED      NEUTROPHILS 49 43 - 78 %    LYMPHOCYTES 35 13 - 44 %    MONOCYTES 14 (H) 4.0 - 12.0 %    EOSINOPHILS 1 0.5 - 7.8 %    BASOPHILS 1 0.0 - 2.0 %    IMMATURE GRANULOCYTES 0 0.0 - 5.0 %    ABS.  NEUTROPHILS 2. 7 1.7 - 8.2 K/UL    ABS. LYMPHOCYTES 2.0 0.5 - 4.6 K/UL    ABS. MONOCYTES 0.8 0.1 - 1.3 K/UL    ABS. EOSINOPHILS 0.0 0.0 - 0.8 K/UL    ABS. BASOPHILS 0.0 0.0 - 0.2 K/UL    ABS. IMM. GRANS. 0.0 0.0 - 0.5 K/UL   METABOLIC PANEL, COMPREHENSIVE    Collection Time: 03/01/18  9:49 AM   Result Value Ref Range    Sodium 142 136 - 145 mmol/L    Potassium 3.7 3.5 - 5.1 mmol/L    Chloride 108 (H) 98 - 107 mmol/L    CO2 27 21 - 32 mmol/L    Anion gap 7 7 - 16 mmol/L    Glucose 173 (H) 65 - 100 mg/dL    BUN 17 8 - 23 MG/DL    Creatinine 1.63 (H) 0.8 - 1.5 MG/DL    GFR est AA 53 (L) >60 ml/min/1.73m2    GFR est non-AA 44 (L) >60 ml/min/1.73m2    Calcium 8.7 8.3 - 10.4 MG/DL    Bilirubin, total 1.4 (H) 0.2 - 1.1 MG/DL    ALT (SGPT) 110 (H) 12 - 65 U/L    AST (SGOT) 90 (H) 15 - 37 U/L    Alk.  phosphatase 135 50 - 136 U/L    Protein, total 7.1 6.3 - 8.2 g/dL    Albumin 3.7 3.2 - 4.6 g/dL    Globulin 3.4 2.3 - 3.5 g/dL    A-G Ratio 1.1 (L) 1.2 - 3.5     BNP    Collection Time: 03/01/18  9:49 AM   Result Value Ref Range    BNP 2443 pg/mL   EKG, 12 LEAD, INITIAL    Collection Time: 03/01/18 11:04 AM   Result Value Ref Range    Ventricular Rate 124 BPM    Atrial Rate 248 BPM    QRS Duration 112 ms    Q-T Interval 352 ms    QTC Calculation (Bezet) 505 ms    Calculated R Axis -21 degrees    Calculated T Axis 160 degrees    Diagnosis       Atrial flutter with 2:1 A-V conduction  Poor R wave progression  Nonspecific ST abnormality  When compared with ECG of 01-JUL-2017 12:29,  Significant changes have occurred  Confirmed by Sofia Traore MD (), HIRAL BURR ([id number]) on 3/1/2018 2:32:21 PM

## 2018-03-01 NOTE — ED TRIAGE NOTES
Pt arrived via ems. Ems gave combivent and pt has decreased lung sounds on the right side. Pt has had a productive cough for the past couple of weeks. Pt denies any pain at time of triage. Pt states having the productive cough for the past couple of weeks. Pt reports having thick white sputum. Pt has swelling in his ankles that started this morning.

## 2018-03-01 NOTE — IP AVS SNAPSHOT
[address] 
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) Your diagnoses also included:  Combined Systolic And Diastolic Congestive Heart Failure (Hcc), Copd (Chronic Obstructive Pulmonary Disease) (Hcc), Type 2 Diabetes Mellitus With Stage 3 Chronic Kidney Disease, Without Long-Term Current Use Of Insulin (Hcc), Ischemic Cardiomyopathy, Htn (Hypertension), Cad (Coronary Artery Disease), Copd With Acute Exacerbation (Hcc), Stage 3 Chronic Kidney Disease, Acute Respiratory Failure With Hypoxia (Hcc), Lft Elevation, Thrombocytopenia (Hcc), Atrial Flutter With Rapid Ventricular Response (Hcc), Cardiomyopathy (Hcc) Follow-up Information Follow up With Details Comments Contact Info [facility] Cardiology Go to see cardiologist on WED. , 14 MARCH, 2018 at 2:15PM. Please keep that important appointment. Phone: [phone] Darrius Ceron MD Schedule an appointment as soon as possible for a visit in 2 days  [address] [place] [address] [address] [place] [id number] 
[phone] Discharge Orders None A check adrienne indicates which time of day the medication should be taken. My Medications START taking these medications Instructions Each Dose to Equal  
 Morning Noon Evening Bedtime  
 azithromycin 250 mg tablet Commonly known as:  Zeina Oviedo Your next dose is:  3-9-2018 Take 1 Tab by mouth daily for 3 days. 250 mg  
    
  
   
   
   
  
 fluticasone-salmeterol 250-50 mcg/dose diskus inhaler Commonly known as:  ADVAIR  
   
 Take 1 Puff by inhalation two (2) times a day. 1 Puff  
    
   
   
   
  
 lisinopril 2.5 mg tablet Commonly known as:  Analilia Paia Your next dose is:  3-9-2018 Take 1 Tab by mouth daily. 2.5 mg  
    
  
   
   
   
  
 metoprolol succinate 100 mg tablet Commonly known as:  TOPROL-XL Your next dose is:  3-8-2018 Take 1 Tab by mouth two (2) times a day. 100 mg CHANGE how you take these medications Instructions Each Dose to Equal  
 Morning Noon Evening Bedtime  
 furosemide 40 mg tablet Commonly known as:  LASIX What changed:   
- when to take this 
- additional instructions Your next dose is:  3-9-30500 Take 1 Tab by mouth daily. Indications: takes 1/2 pill on saturday and sunday, then 40 mg rest of the week. 40 mg CONTINUE taking these medications Instructions Each Dose to Equal  
 Morning Noon Evening Bedtime  
 aspirin 81 mg chewable tablet Your last dose was:  3-8-2018 Take 1 tablet by mouth daily. 81 mg  
    
  
   
   
   
  
 glimepiride 2 mg tablet Commonly known as:  AMARYL Your next dose is:  3-9-2018 Take 2 mg by mouth every morning. 2 mg KLOR-CON 10 10 mEq tablet Generic drug:  potassium chloride SR Your next dose is:  3-8-2018 Take 20 mEq by mouth two (2) times a day. 20 mEq  
    
   
   
  
   
  
 levETIRAcetam 750 mg tablet Commonly known as:  KEPPRA Your next dose is:  3-8-2018 Take 750 mg by mouth two (2) times a day. 750 mg  
    
   
   
  
   
  
 levothyroxine 75 mcg tablet Commonly known as:  SYNTHROID Your next dose is:  3-9-2018 Take 75 mcg by mouth Daily (before breakfast). 75 mcg  
    
  
   
   
   
  
 nitroglycerin 0.4 mg SL tablet Commonly known as:  NITROSTAT  
   
 by SubLINGual route every five (5) minutes as needed for Chest Pain. rosuvastatin 20 mg tablet Commonly known as:  CRESTOR Your next dose is:  3-8-2018 Take 20 mg by mouth nightly. 20 mg  
    
   
   
   
  
  
 SINGULAIR 10 mg tablet Generic drug:  montelukast  
Your next dose is:  3-9-2018 Take 10 mg by mouth daily. 10 mg  
    
  
   
   
   
  
 tiotropium 18 mcg inhalation capsule Commonly known as:  [address] Your next dose is:  3-9-2018 Take 1 Cap by inhalation daily. 1 Cap VENTOLIN HFA 90 mcg/actuation inhaler Generic drug:  albuterol Take  by inhalation as needed for Wheezing. XANAX 0.25 mg tablet Generic drug:  ALPRAZolam  
   
 Take 0.25 mg by mouth two (2) times daily as needed for Anxiety. 0.25 mg  
    
   
   
   
  
  
STOP taking these medications   
 amiodarone 200 mg tablet Commonly known as:  CORDARONE  
   
  
 carvedilol 25 mg tablet Commonly known as:  COREG  
   
  
 losartan 50 mg tablet Commonly known as:  COZAAR Where to Get Your Medications Information on where to get these meds will be given to you by the nurse or doctor. ! Ask your nurse or doctor about these medications  
  azithromycin 250 mg tablet  
 fluticasone-salmeterol 250-50 mcg/dose diskus inhaler  
 furosemide 40 mg tablet  
 lisinopril 2.5 mg tablet  
 metoprolol succinate 100 mg tablet Discharge Instructions CARDIAC DEVICE INSTRUCTION SHEET 1. You should have received a 1-2 weeks follow up appointment upon discharge from the hospital.  If you did not receive this appointment prior to leaving the hospital, please contact us at [phone]. 2.  Keep your incision dry for 14 days. DO NOT put ointments, creams or lotions on the incision for 2 weeks. 3.  Your dressing will be removed at your follow up appointment. If you have sutures/staples, the nurse will remove them at the follow up appointment. 4.  Call us IMMEDIATELY if you develop fever, pain, redness, or drainage at the incision site. 5.  You may use your pacemaker/ICD arm, but keep your arm lower than your shoulder for the first 8 weeks (or until cleared by a physician) to prevent the device lead(s) from moving. 6.  Do not lift more than 10 pounds for 4 weeks and 20 pounds for 8 weeks. 7.  Within 6 weeks you should receive your cardiac device ID card. Carry your card with you at all times. Always show it to any doctor or dentist you see. 8.    You will need to have your device evaluated every 3 months via office, remote, or telephone. 9.  Microwaves WILL NOT harm your device. Warnings do not apply to you. 10.  At airports, always show your cardiac device ID card. You may walk through metal detectors but do not allow the hand held wand near your device. 11.  DO NOT have an MRI without contacting your cardiologists office first.  
 
12.  Please refer to the education booklet given to you at the hospital for the activities and equipment you should avoid. Some may reprogram or interfere with your cardiac device. Heart Failure: Care Instructions Your Care Instructions Heart failure occurs when your heart does not pump as much blood as the body needs. Failure does not mean that the heart has stopped pumping but rather that it is not pumping as well as it should. Over time, this causes fluid buildup in your lungs and other parts of your body. Fluid buildup can cause shortness of breath, fatigue, swollen ankles, and other problems. By taking medicines regularly, reducing sodium (salt) in your diet, checking your weight every day, and making lifestyle changes, you can feel better and live longer. Follow-up care is a key part of your treatment and safety. Be sure to make and go to all appointments, and call your doctor if you are having problems.  It's also a good idea to know your test results and keep a list of the medicines you take. How can you care for yourself at home? Medicines ? · Be safe with medicines. Take your medicines exactly as prescribed. Call your doctor if you think you are having a problem with your medicine. ? · Do not take any vitamins, over-the-counter medicine, or herbal products without talking to your doctor first. Mark Speck not take ibuprofen (Advil or Motrin) and naproxen (Aleve) without talking to your doctor first. They could make your heart failure worse. ? · You may be taking some of the following medicine. ¨ Beta-blockers can slow heart rate, decrease blood pressure, and improve your condition. Taking a beta-blocker may lower your chance of needing to be hospitalized. ¨ Angiotensin-converting enzyme inhibitors (ACEIs) reduce the heart's workload, lower blood pressure, and reduce swelling. Taking an ACEI may lower your chance of needing to be hospitalized again. ¨ Angiotensin II receptor blockers (ARBs) work like ACEIs. Your doctor may prescribe them instead of ACEIs. ¨ Diuretics, also called water pills, reduce swelling. ¨ Potassium supplements replace this important mineral, which is sometimes lost with diuretics. ¨ Aspirin and other blood thinners prevent blood clots, which can cause a stroke or heart attack. ? You will get more details on the specific medicines your doctor prescribes. Diet ? · Your doctor may suggest that you limit sodium to 2,000 milligrams (mg) a day or less. That is less than 1 teaspoon of salt a day, including all the salt you eat in cooking or in packaged foods. People get most of their sodium from processed foods. Fast food and restaurant meals also tend to be very high in sodium. ? · Ask your doctor how much liquid you can drink each day. You may have to limit liquids. ?Weight ? · Weigh yourself without clothing at the same time each day. Record your weight.  Call your doctor if you have a sudden weight gain, such as more than 2 to 3 pounds in a day or 5 pounds in a week. (Your doctor may suggest a different range of weight gain.) A sudden weight gain may mean that your heart failure is getting worse. ? Activity level ? · Start light exercise (if your doctor says it is okay). Even if you can only do a small amount, exercise will help you get stronger, have more energy, and manage your weight and your stress. Walking is an easy way to get exercise. Start out by walking a little more than you did before. Bit by bit, increase the amount you walk. ? · When you exercise, watch for signs that your heart is working too hard. You are pushing yourself too hard if you cannot talk while you are exercising. If you become short of breath or dizzy or have chest pain, stop, sit down, and rest.  
? · If you feel \"wiped out\" the day after you exercise, walk slower or for a shorter distance until you can work up to a better pace. ? · Get enough rest at night. Sleeping with 1 or 2 pillows under your upper body and head may help you breathe easier. ? Lifestyle changes ? · Do not smoke. Smoking can make a heart condition worse. If you need help quitting, talk to your doctor about stop-smoking programs and medicines. These can increase your chances of quitting for good. Quitting smoking may be the most important step you can take to protect your heart. ? · Limit alcohol to 2 drinks a day for men and 1 drink a day for women. Too much alcohol can cause health problems. ? · Avoid getting sick from colds and the flu. Get a pneumococcal vaccine shot. If you have had one before, ask your doctor whether you need another dose. Get a flu shot each year. If you must be around people with colds or the flu, wash your hands often. When should you call for help? Call 911 if you have symptoms of sudden heart failure such as: 
? · You have severe trouble breathing. ? · You cough up pink, foamy mucus. ? · You have a new irregular or rapid heartbeat. ?Call your doctor now or seek immediate medical care if: 
? · You have new or increased shortness of breath. ? · You are dizzy or lightheaded, or you feel like you may faint. ? · You have sudden weight gain, such as more than 2 to 3 pounds in a day or 5 pounds in a week. (Your doctor may suggest a different range of weight gain.) ? · You have increased swelling in your legs, ankles, or feet. ? · You are suddenly so tired or weak that you cannot do your usual activities. ? Watch closely for changes in your health, and be sure to contact your doctor if you develop new symptoms. Where can you learn more? Go to http://liz-jovanna.info/. Enter R617 in the search box to learn more about \"Heart Failure: Care Instructions. \" Current as of: September 21, 2016 Content Version: 11.4 © 1853-1202 [employer]. Care instructions adapted under license by [employer] (which disclaims liability or warranty for this information). If you have questions about a medical condition or this instruction, always ask your healthcare professional. [place] [address] any warranty or liability for your use of this information. Pyrolia Announcement We are excited to announce that we are making your provider's discharge notes available to you in Pyrolia. You will see these notes when they are completed and signed by the physician that discharged you from your recent hospital stay. If you have any questions or concerns about any information you see in Pyrolia, please call the Health Information Department where you were seen or reach out to your Primary Care Provider for more information about your plan of care. Introducing [facility]! [facility] introduces Pyrolia patient portal. Now you can access parts of your medical record, email your doctor's office, and request medication refills online.    
 
1. In your internet browser, go to [email or web address]://[email or web address]. [facility]/Shopping Buddyhart 2. Click on the First Time User? Click Here link in the Sign In box. You will see the New Member Sign Up page. 3. Enter your [employer] Access Code exactly as it appears below. You will not need to use this code after youve completed the sign-up process. If you do not sign up before the expiration date, you must request a new code. · [employer] Access Code: ALICE ALANIZ [facility] Expires: 5/30/2018  9:37 AM 
 
4. Enter the last four digits of your Social Security Number (xxxx) and Date of Birth (mm/dd/yyyy) as indicated and click Submit. You will be taken to the next sign-up page. 5. Create a [employer]ist ID. This will be your [employer] login ID and cannot be changed, so think of one that is secure and easy to remember. 6. Create a [employer] password. You can change your password at any time. 7. Enter your Password Reset Question and Answer. This can be used at a later time if you forget your password. 8. Enter your e-mail address. You will receive e-mail notification when new information is available in [address] 9. Click Sign Up. You can now view and download portions of your medical record. 10. Click the Download Summary menu link to download a portable copy of your medical information. If you have questions, please visit the Frequently Asked Questions section of the [employer] website. Remember, [employer] is NOT to be used for urgent needs. For medical emergencies, dial 911. Now available from your iPhone and Android! Providers Seen During Your Hospitalization Provider Specialty Primary office phone Rose Jefferson MD Emergency Medicine [phone] Gwendolyn Mendoza, [facility] [phone] Immunizations Administered for This Admission Name Date  
 TB Skin Test (PPD) Intradermal 3/3/2018 Your Primary Care Physician (PCP) Primary Care Physician Office Phone Office Fax Apolonia Guthrie [phone] You are allergic to the following Allergen Reactions Mylanta (Aluminum-Magnesium Hydroxide) Rash Recent Documentation Height Weight BMI Smoking Status 1.905 m 84.5 kg 23.29 kg/m2 Former Smoker Emergency Contacts Name Discharge Info Relation Home Work Mobile Nancy Carter  Child [2] [phone] Patient Belongings The following personal items are in your possession at time of discharge: 
  Dental Appliances: None  Visual Aid: Glasses, With patient      Home Medications: None   Jewelry: Watch, With patient  Clothing: Footwear, Pants, Shirt, Undergarments, With patient    Other Valuables: Cell Phone, With patient Please provide this summary of care documentation to your next provider. Signatures-by signing, you are acknowledging that this After Visit Summary has been reviewed with you and you have received a copy. Patient Signature:  ____________________________________________________________ Date:  ____________________________________________________________  
  
[facility] November Provider Signature:  ____________________________________________________________ Date:  ____________________________________________________________

## 2018-03-01 NOTE — PROGRESS NOTES
TRANSFER - IN REPORT:    Verbal report received from Bala Real RN on [address] [facility] being received from ED for routine progression of care      Report consisted of patients Situation, Background, Assessment and Recommendations(SBAR). Information from the following report(s) SBAR, Kardex and ED Summary was reviewed with the receiving nurse. Opportunity for questions and clarification was provided. Assessment completed upon patients arrival to unit and care assumed. Telemetry monitor applied. VS taken. Cardizem gtt verified at 10 mg/hr. New IV placed due to left arm IV infiltration PTA. Pt denies any pain or SOB. Pt BP recycling Q30 minutes. Bed low and locked. Bed alarm on. Side rails x 2. Call light within reach. Pt verbalizes understanding to call for assistance.

## 2018-03-01 NOTE — PROGRESS NOTES
Skin assessment completed with secondary RN. Sacrum and heels intact with no breakdown noted. Left arm red infiltrated IV taken out and new IV placed.

## 2018-03-01 NOTE — H&P
Hospitalist H&P Note     Admit Date:  3/1/2018 11:00 AM   Name:  Carly Enriquez   Age:  69 y.o.  :  1940   MRN:  [id number]   PCP:  Ting Ricketts MD  Treatment Team: Attending Provider: Martina Calles MD; Consulting Provider: Pasquale Santamaria MD    HPI:   Pt is a 68 y/o M with DM, cCHF, COPD, who presented to ER with SOB, cough semi-productive of whitish sputum, and worsening leg swelling. Says symptoms started roughly 3 days ago and progressively worsened. SOB worse with exertion, laying down. Better with rest.  Tachycardic in ER. BNP 2400, baseline possibly around 900 but limited data in our chart. Denies CP, palpitations, HA, fevers, chills, n/v, diaphoresis, urinary or bowel changes. 10 systems reviewed and negative except as noted in HPI. Past Medical History:   Diagnosis Date    Asthma     CAD (coronary artery disease)     Chronic subdural hematoma ([facility] 3/1/2018    COPD     Diabetes ([facility]     Endocrine disease     Gastrointestinal disorder     Heart failure ([facility]     Hypertension     Other ill-defined conditions(799.89)     elevated cholersterol    PUD (peptic ulcer disease)       Past Surgical History:   Procedure Laterality Date    CARDIAC SURG PROCEDURE UNLIST      bipass surgery      Allergies   Allergen Reactions    Mylanta [Aluminum-Magnesium Hydroxide] Rash      Social History   Substance Use Topics    Smoking status: Former Smoker     Packs/day: 1.50     Years: 40.00     Quit date: 1998    Smokeless tobacco: Never Used      Comment: quit     Alcohol use No      No family history on file. There is no immunization history for the selected administration types on file for this patient. PTA Medications:  Prior to Admission Medications   Prescriptions Last Dose Informant Patient Reported? Taking? ALPRAZolam (XANAX) 0.25 mg tablet   Yes Yes   Sig: Take 0.25 mg by mouth two (2) times daily as needed for Anxiety.    albuterol (VENTOLIN HFA) 90 mcg/actuation inhaler   Yes Yes   Sig: Take  by inhalation as needed for Wheezing. amiodarone (CORDARONE) 200 mg tablet   Yes Yes   Sig: Take 200 mg by mouth. aspirin 81 mg chewable tablet   Yes Yes   Sig: Take 1 tablet by mouth daily. carvedilol (COREG) 25 mg tablet   No Yes   Sig: Take 1 tablet by mouth two (2) times daily (with meals). furosemide (LASIX) 40 mg tablet   Yes Yes   Sig: Take 40 mg by mouth two (2) times a day. 20 mg in am, 40 mg hs  Indications: takes 1/2 pill on saturday and sunday, then 40 mg rest of the week. glimepiride (AMARYL) 2 mg tablet   Yes Yes   Sig: Take 2 mg by mouth every morning. levETIRAcetam (KEPPRA) 750 mg tablet   Yes Yes   Sig: Take 750 mg by mouth two (2) times a day. losartan (COZAAR) 50 mg tablet   No Yes   Sig: Take 1 tablet by mouth daily. montelukast (SINGULAIR) 10 mg tablet   Yes Yes   Sig: Take 10 mg by mouth daily. nitroglycerin (NITROSTAT) 0.4 mg SL tablet   Yes Yes   Sig: by SubLINGual route every five (5) minutes as needed for Chest Pain. rosuvastatin (CRESTOR) 20 mg tablet   Yes Yes   Sig: Take 20 mg by mouth nightly. tiotropium (SPIRIVA WITH HANDIHALER) 18 mcg inhalation capsule   No Yes   Sig: Take 1 Cap by inhalation daily.       Facility-Administered Medications: None       Objective:     Patient Vitals for the past 24 hrs:   Temp Pulse Resp BP SpO2   03/01/18 1803 98.7 °F (37.1 °C) 88 18 124/78 98 %   03/01/18 1730 - 88 - 124/82 -   03/01/18 1700 - 82 - 131/77 -   03/01/18 1630 - 86 - 148/80 -   03/01/18 1600 - 86 - 134/82 -   03/01/18 1530 - 94 - 145/80 -   03/01/18 1500 - (!) 108 - 121/85 -   03/01/18 1430 - (!) 124 - 128/82 -   03/01/18 1338 - (!) 124 20 129/84 94 %   03/01/18 1247 - (!) 124 - - 95 %   03/01/18 1144 - - - - 95 %   03/01/18 1110 - (!) 123 - - 95 %   03/01/18 0940 97.6 °F (36.4 °C) (!) 123 20 132/88 96 %     Oxygen Therapy  O2 Sat (%): 98 % (03/01/18 1803)  Pulse via Oximetry: 127 beats per minute (03/01/18 1247)  O2 Device: Nasal cannula (03/01/18 1837)  O2 Flow Rate (L/min): 2 l/min (03/01/18 1837)    Intake/Output Summary (Last 24 hours) at 03/01/18 1935  Last data filed at 03/01/18 1800   Gross per 24 hour   Intake                0 ml   Output             1400 ml   Net            -1400 ml       Physical Exam:  General:    Well nourished. Alert. On oxygen 2L  Eyes:   Normal sclera. Extraocular movements intact. ENT:  Normocephalic, atraumatic. Moist mucous membranes  CV:   Irregular, tachy. No m/r/g. Lungs:  Diminished BS bilaterally. Exp wheezing faint. Scattered rales  Abdomen: Soft, nontender, nondistended. Bowel sounds normal.   Extremities: Warm and dry. No cyanosis. 3+ pitting edema BLE  Neurologic: CN II-XII grossly intact. Sensation intact. Skin:     No rashes or jaundice. Normal coloration  Psych:  Normal mood and affect. I reviewed the labs, imaging, EKGs, telemetry, and other studies done this admission. Data Review:   Recent Results (from the past 24 hour(s))   TROPONIN I    Collection Time: 03/01/18  9:49 AM   Result Value Ref Range    Troponin-I, Qt. <0.02 (L) 0.02 - 0.05 NG/ML   CBC WITH AUTOMATED DIFF    Collection Time: 03/01/18  9:49 AM   Result Value Ref Range    WBC 5.6 4.3 - 11.1 K/uL    RBC 4.84 4.23 - 5.67 M/uL    HGB 12.1 (L) 13.6 - 17.2 g/dL    HCT 36.5 (L) 41.1 - 50.3 %    MCV 75.4 (L) 79.6 - 97.8 FL    MCH 25.0 (L) 26.1 - 32.9 PG    MCHC 33.2 31.4 - 35.0 g/dL    RDW 15.2 (H) 11.9 - 14.6 %    PLATELET 605 (L) 634 - 450 K/uL    MPV Cannot be calculated 10.8 - 14.1 FL    DF AUTOMATED      NEUTROPHILS 49 43 - 78 %    LYMPHOCYTES 35 13 - 44 %    MONOCYTES 14 (H) 4.0 - 12.0 %    EOSINOPHILS 1 0.5 - 7.8 %    BASOPHILS 1 0.0 - 2.0 %    IMMATURE GRANULOCYTES 0 0.0 - 5.0 %    ABS. NEUTROPHILS 2.7 1.7 - 8.2 K/UL    ABS. LYMPHOCYTES 2.0 0.5 - 4.6 K/UL    ABS. MONOCYTES 0.8 0.1 - 1.3 K/UL    ABS. EOSINOPHILS 0.0 0.0 - 0.8 K/UL    ABS. BASOPHILS 0.0 0.0 - 0.2 K/UL    ABS. IMM.  GRANS. 0.0 0.0 - 0.5 K/UL   METABOLIC PANEL, COMPREHENSIVE    Collection Time: 03/01/18  9:49 AM   Result Value Ref Range    Sodium 142 136 - 145 mmol/L    Potassium 3.7 3.5 - 5.1 mmol/L    Chloride 108 (H) 98 - 107 mmol/L    CO2 27 21 - 32 mmol/L    Anion gap 7 7 - 16 mmol/L    Glucose 173 (H) 65 - 100 mg/dL    BUN 17 8 - 23 MG/DL    Creatinine 1.63 (H) 0.8 - 1.5 MG/DL    GFR est AA 53 (L) >60 ml/min/1.73m2    GFR est non-AA 44 (L) >60 ml/min/1.73m2    Calcium 8.7 8.3 - 10.4 MG/DL    Bilirubin, total 1.4 (H) 0.2 - 1.1 MG/DL    ALT (SGPT) 110 (H) 12 - 65 U/L    AST (SGOT) 90 (H) 15 - 37 U/L    Alk. phosphatase 135 50 - 136 U/L    Protein, total 7.1 6.3 - 8.2 g/dL    Albumin 3.7 3.2 - 4.6 g/dL    Globulin 3.4 2.3 - 3.5 g/dL    A-G Ratio 1.1 (L) 1.2 - 3.5     BNP    Collection Time: 03/01/18  9:49 AM   Result Value Ref Range    BNP 2443 pg/mL   EKG, 12 LEAD, INITIAL    Collection Time: 03/01/18 11:04 AM   Result Value Ref Range    Ventricular Rate 124 BPM    Atrial Rate 248 BPM    QRS Duration 112 ms    Q-T Interval 352 ms    QTC Calculation (Bezet) 505 ms    Calculated R Axis -21 degrees    Calculated T Axis 160 degrees    Diagnosis       Atrial flutter with 2:1 A-V conduction  Poor R wave progression  Nonspecific ST abnormality  When compared with ECG of 01-JUL-2017 12:29,  Significant changes have occurred  Confirmed by Frances Garcia MD (), HIRAL [facility]UR[facility] ([id number]) on 3/1/2018 2:32:21 PM     GLUCOSE, POC    Collection Time: 03/01/18  5:51 PM   Result Value Ref Range    Glucose (POC) 140 (H) 65 - 100 mg/dL       All Micro Results     None          Other Studies:  Xr Chest Pa Lat    Result Date: 3/1/2018  Chest PA and lateral views INDICATION: Shortness of breath. Productive cough COMPARISON: 08/16/2017 FINDINGS: Postoperative changes from CABG with enlarged cardiac silhouette. Left basal scarring is stable. No acute pulmonary edema or infiltrate. No pleural effusion or pneumothorax. IMPRESSION: Stable chest x-ray with chronic findings. No acute abnormality. Assessment and Plan:     Hospital Problems as of 3/1/2018  Never Reviewed          Codes Class Noted - Resolved POA    COPD with acute exacerbation ([facility] ICD-10-CM: J44.1  ICD-9-CM: 491.21  3/1/2018 - Present Yes        * (Principal)Acute on chronic combined systolic and diastolic CHF (congestive heart failure) ([facility] ICD-10-CM: I50.43  ICD-9-CM: 428.43, 428.0  3/1/2018 - Present Yes        Stage 3 chronic kidney disease (Chronic) ICD-10-CM: N18.3  ICD-9-CM: 585.3  3/1/2018 - Present Yes        Acute respiratory failure with hypoxia (HCC) ICD-10-CM: J96.01  ICD-9-CM: 518.81  3/1/2018 - Present Yes        LFT elevation ICD-10-CM: R79.89  ICD-9-CM: 790.6  3/1/2018 - Present Yes        Thrombocytopenia ([facility] ICD-10-CM: D69.6  ICD-9-CM: 287.5  3/1/2018 - Present Yes        Atrial flutter with rapid ventricular response (HCC) ICD-10-CM: I48.92  ICD-9-CM: 427.32  3/1/2018 - Present Yes        Type 2 diabetes mellitus with stage 3 chronic kidney disease, without long-term current use of insulin (HCC) (Chronic) ICD-10-CM: E11.22, N18.3  ICD-9-CM: 250.40, 585.3  7/1/2017 - Present Yes        COPD (chronic obstructive pulmonary disease) (HCC) (Chronic) ICD-10-CM: J44.9  ICD-9-CM: 496  7/1/2017 - Present Yes        Combined systolic and diastolic congestive heart failure (HCC) (Chronic) ICD-10-CM: I50.40  ICD-9-CM: 428.40  7/1/2017 - Present Yes        Ischemic cardiomyopathy (Chronic) ICD-10-CM: I25.5  ICD-9-CM: 414.8  7/1/2017 - Present Yes        HTN (hypertension) (Chronic) ICD-10-CM: I10  ICD-9-CM: 401.9  12/14/2014 - Present Yes        CAD (coronary artery disease) (Chronic) ICD-10-CM: I25.10  ICD-9-CM: 414.00  12/14/2014 - Present Yes              PLAN:  · Admit to inpatient  · IV lasix, daily weights, mayorga, strict I/O, 2d echo. Got 60mg IV lasix in ER. Monitor BMP  · Cardio c/s per protocol.   Also noted to have aflutter RVR on EKG  · Pt refused AICD in past.  EF 25-30% on echo June 2016 in Care Every[facility]  · Albuterol nebs; wheezing mild, don't think he needs steroids, should improve with CHF treatment. COPD component possible   · ISS for DM. Hold home PO meds  · Check liver US for thrombocytopenia, elevated LFTs/bili. Recheck tomorrow. Hold heparin dvt ppx if plt <50k or 50% drop    Discharge planning:  No anticipate needs.   Home likely  DVT ppx: heparin  Code status:  Full  Estimated LOS:  Greater than 2 midnights  Risk:  high    Signed:  Ael Ibrahim MD

## 2018-03-02 NOTE — PROGRESS NOTES
Care Management Interventions  PCP Verified by CM: Yes  Transition of Care Consult (CM Consult): Discharge Planning  Physical Therapy Consult: Yes  Current Support Network: Lives Alone  Confirm Follow Up Transport: Self  Plan discussed with Pt/Family/Caregiver: Yes  Freedom of Choice Offered: Yes  Discharge Location  Discharge Placement: Home  Met with patient for d/c planning. Patient alert and oriented x 3, states he is independent of ADL's and lives alone. Has a step daughter that can assist him if needed. He states he still drives and has a cane at home. He uses [facility] pharmacy and is able to obtain his medications. Patient plans to return home. PPD placed and PT consult in case rehab needed. Current d/c plan is home would benefit from [id number] and PT.

## 2018-03-02 NOTE — PROGRESS NOTES
Hospitalist Progress Note    Subjective:   Daily Progress Note: 3/2/2018 3:48 PM    Patient presented to ER 3/1/18 with complaints of productive cough of thick white sputum x 2 weeks, orthopnea, , wheezing, progressively worsening shortness of breath, and bilateral lower extremity edema that began am of presentation. Denied recent or current illness or exposure, no fever. Oxygen and duoneb en route per EMS with some relief. Known COPD and asthma, CAD, CHF. Heart rate is 123 on admission to ER. Wheezes and basilar rales noted. +3 pitting edema to bilateral lower extremities, left >right. Negative troponin. H/H: 12.1/36.5, platelets: 741, glucose: 173, creatinine: 1.63 with gfr: 44. BNP: 2400, baseline 900. No chest pain  Found in atrial flutter with RVR. Last EKG 9/2017 in [facility]. Admitted to tele with IV lasix, daily weights, mayorga, strict I+O, nebs, SSI. Refused AICD in past.  2016 Echocardiogram 25-30% EF, holding home meds. IV diltiazem begun. Takes amiodarone at home. He was recently admitted with unstable angina. Seen by Cardiology. A1C: 8.1    Today:  Seen by Diabetes educator A1C was 6.8 in September of 2017, currently 8.1 on glimepiride 2 mg daily x several years. Reports compliance, does not check glucose.      Additional history:  Asthma, COPD, CAD with CABG x 2 episodes (last 2003), cardiac stents x 2 2014, CHF, NIDDM x 10 years with current A1C 8.1, prior 6.8, CAD, hyperlipidemia, PUD, chronic subdural hematoma requiring surgery, frequent falls,   Current Facility-Administered Medications   Medication Dose Route Frequency    dilTIAZem (CARDIZEM) 100 mg in 0.9% sodium chloride (MBP/ADV) 100 mL infusion  0-15 mg/hr IntraVENous TITRATE    dilTIAZem CD (CARDIZEM CD) capsule 120 mg  120 mg Oral DAILY    ALPRAZolam (XANAX) tablet 0.25 mg  0.25 mg Oral BID PRN    aspirin chewable tablet 81 mg  81 mg Oral DAILY    carvedilol (COREG) tablet 25 mg  25 mg Oral BID WITH MEALS    levETIRAcetam (KEPPRA) tablet 750 mg  750 mg Oral BID    montelukast (SINGULAIR) tablet 10 mg  10 mg Oral DAILY    rosuvastatin (CRESTOR) tablet 20 mg  20 mg Oral QHS    tiotropium (SPIRIVA) inhalation capsule 18 mcg  1 Cap Inhalation DAILY    bisacodyl (DULCOLAX) tablet 5 mg  5 mg Oral DAILY PRN    guaiFENesin-dextromethorphan (ROBITUSSIN DM) 100-10 mg/5 mL syrup 10 mL  10 mL Oral Q4H PRN    hydrALAZINE (APRESOLINE) 20 mg/mL injection 20 mg  20 mg IntraVENous Q6H PRN    polyethylene glycol (MIRALAX) packet 17 g  17 g Oral DAILY PRN    simethicone (MYLICON) tablet 80 mg  80 mg Oral QID PRN    furosemide (LASIX) injection 40 mg  40 mg IntraVENous BID    sodium chloride (NS) flush 5-10 mL  5-10 mL IntraVENous Q8H    sodium chloride (NS) flush 5-10 mL  5-10 mL IntraVENous PRN    acetaminophen (TYLENOL) tablet 650 mg  650 mg Oral Q4H PRN    HYDROcodone-acetaminophen (NORCO) 5-325 mg per tablet 1 Tab  1 Tab Oral Q4H PRN    naloxone (NARCAN) injection 0.4 mg  0.4 mg IntraVENous PRN    diphenhydrAMINE (BENADRYL) injection 25 mg  25 mg IntraVENous Q4H PRN    ondansetron (ZOFRAN) injection 4 mg  4 mg IntraVENous Q4H PRN    senna-docusate (PERICOLACE) 8.6-50 mg per tablet 2 Tab  2 Tab Oral DAILY PRN    LORazepam (ATIVAN) tablet 1 mg  1 mg Oral Q4H PRN    zolpidem (AMBIEN) tablet 5 mg  5 mg Oral QHS PRN    insulin lispro (HUMALOG) injection   SubCUTAneous AC&HS    dextrose 40% (GLUTOSE) oral gel 1 Tube  15 g Oral PRN    glucagon (GLUCAGEN) injection 1 mg  1 mg IntraMUSCular PRN    dextrose (D50W) injection syrg 12.5-25 g  25-50 mL IntraVENous PRN    albuterol (PROVENTIL VENTOLIN) nebulizer solution 2.5 mg  2.5 mg Nebulization Q4H PRN    albuterol (PROVENTIL VENTOLIN) nebulizer solution 2.5 mg  2.5 mg Nebulization Q8H RT      Review of Systems  A comprehensive review of systems was negative except for that written in the HPI.     Objective:     Visit Vitals    /56 (BP Patient Position: At rest)    Pulse 81    Temp 98.4 °F (36.9 °C)    Resp 16    Ht 6' 3\" (1.905 m)    Wt 89.4 kg (197 lb 3.2 oz)    SpO2 93%    BMI 24.65 kg/m2    O2 Flow Rate (L/min): 2 l/min O2 Device: Nasal cannula    Temp (24hrs), Av.3 °F (36.8 °C), Min:97.8 °F (36.6 °C), Max:98.7 °F (37.1 °C)    701 - 1900  In: -   Out: 1000 [Urine:1000]  1901 -  07  In: 165 [P.O.:165]  Out: 2550 [Urine:2550]    General appearance: Oriented and alert, cooperative, reports is feeling better. Head: Normocephalic, without obvious abnormality, atraumatic  Eyes: conjunctivae/corneas clear. PERRL  Throat: Lips, mucosa, and tongue normal. Teeth and gums normal  Neck: supple, symmetrical, trachea midline, no adenopathy, no carotid bruit and no JVD  Lungs: clear to auscultation bilaterally with rare scattered wheezes. Heart: Irregular rate and rhythm, S1, S2 normal, no murmur, click, rub or gallop  Abdomen: soft, non-tender. Bowel sounds normal. No masses,  no organomegaly  Extremities: extremities normal, atraumatic, no cyanosis or edema  Pulses: 2+ and symmetric  Skin: Skin color, texture, turgor normal. No rashes or lesions  Neurologic: Grossly normal    Additional comments:  Orders, notes, test results, vitals reviewed.      Data Review  Recent Results (from the past 24 hour(s))   GLUCOSE, POC    Collection Time: 18  5:51 PM   Result Value Ref Range    Glucose (POC) 140 (H) 65 - 100 mg/dL   GLUCOSE, POC    Collection Time: 18  9:10 PM   Result Value Ref Range    Glucose (POC) 230 (H) 65 - 100 mg/dL   MAGNESIUM    Collection Time: 18  3:50 AM   Result Value Ref Range    Magnesium 1.9 1.8 - 2.4 mg/dL   HEMOGLOBIN A1C WITH EAG    Collection Time: 18  3:50 AM   Result Value Ref Range    Hemoglobin A1c 8.1 (H) 4.8 - 6.0 %    Est. average glucose 156 mg/dL   METABOLIC PANEL, BASIC    Collection Time: 18  3:50 AM   Result Value Ref Range    Sodium 142 136 - 145 mmol/L    Potassium 3.0 (L) 3.5 - 5.1 mmol/L Chloride 104 98 - 107 mmol/L    CO2 28 21 - 32 mmol/L    Anion gap 10 7 - 16 mmol/L    Glucose 138 (H) 65 - 100 mg/dL    BUN 18 8 - 23 MG/DL    Creatinine 1.52 (H) 0.8 - 1.5 MG/DL    GFR est AA 58 (L) >60 ml/min/1.73m2    GFR est non-AA 48 (L) >60 ml/min/1.73m2    Calcium 8.4 8.3 - 10.4 MG/DL   HEPATIC FUNCTION PANEL    Collection Time: 03/02/18  3:50 AM   Result Value Ref Range    Protein, total 6.2 (L) 6.3 - 8.2 g/dL    Albumin 3.2 3.2 - 4.6 g/dL    Globulin 3.0 2.3 - 3.5 g/dL    A-G Ratio 1.1 (L) 1.2 - 3.5      Bilirubin, total 1.1 0.2 - 1.1 MG/DL    Bilirubin, direct 0.5 (H) <0.4 MG/DL    Alk.  phosphatase 115 50 - 136 U/L    AST (SGOT) 65 (H) 15 - 37 U/L    ALT (SGPT) 100 (H) 12 - 65 U/L   CBC W/O DIFF    Collection Time: 03/02/18  3:50 AM   Result Value Ref Range    WBC 5.0 4.3 - 11.1 K/uL    RBC 4.59 4.23 - 5.67 M/uL    HGB 11.3 (L) 13.6 - 17.2 g/dL    HCT 34.5 (L) 41.1 - 50.3 %    MCV 75.2 (L) 79.6 - 97.8 FL    MCH 24.6 (L) 26.1 - 32.9 PG    MCHC 32.8 31.4 - 35.0 g/dL    RDW 15.2 (H) 11.9 - 14.6 %    PLATELET 233 (L) 065 - 450 K/uL    MPV Cannot be calculated 10.8 - 14.1 FL   GLUCOSE, POC    Collection Time: 03/02/18  6:15 AM   Result Value Ref Range    Glucose (POC) 121 (H) 65 - 100 mg/dL   EKG, 12 LEAD, SUBSEQUENT    Collection Time: 03/02/18  9:45 AM   Result Value Ref Range    Ventricular Rate 82 BPM    Atrial Rate 82 BPM    P-R Interval 268 ms    QRS Duration 114 ms    Q-T Interval 406 ms    QTC Calculation (Bezet) 474 ms    Calculated P Axis 66 degrees    Calculated R Axis -65 degrees    Calculated T Axis -133 degrees    Diagnosis       Sinus rhythm with 1st degree A-V block with occasional Premature ventricular   complexes  Left anterior fascicular block  ST & T wave abnormality, consider inferior ischemia  Prolonged QT  Abnormal ECG  When compared with ECG of 01-MAR-2018 09:45,  Significant changes have occurred     GLUCOSE, POC    Collection Time: 03/02/18 10:56 AM   Result Value Ref Range    Glucose (POC) 144 (H) 65 - 100 mg/dL      CXR: 3/1/18:  Postoperative changes from CABG with enlarged cardiac silhouette. Left basal scarring is stable. No acute pulmonary edema or infiltrate. No pleural effusion or pneumothorax. IMPRESSION: Stable chest x-ray with chronic findings. No acute abnormality. 3/1/18:  ABDOMINAL ULTRASOUND:  The liver, pancreas, spleen, and both kidneys are of normal size and echogenicity. The abdominal aorta, IVC, portal venous doppler studies are unremarkable. Impression: Bilateral renal cysts    ECHOCARDIOGRAM:  -  Left ventricle: The ventricle was moderately dilated. The ventricle was severely dilated. Systolic function was severely reduced. Ejection fraction  Was estimated in the range of 10 % to 15 %. SVi= 17.8. There was severe diffuse hypokinesis with regional variations. -  Ventricular septum: There was paradoxical motion. -  Right ventricle: The ventricle was dilated. Systolic function was markedly reduced.     Assessment/Plan:   Shortness of breath  Acute respiratory failure with hypoxia   Atrial flutter with RVR, not a candidate for oral AC  Orthopnea  Distal edema  Acute on chronic combined systolic and diastolic CHF last EF: 84%, CURRENT 10-15%  HTN   NIDDM, A1C now 8.1, 6 months ago: 6.8  Chronic subdural hematoma with history of neurosurgery evacuation  Acute exacerbation of COPD  CAD with history of CABG x 2 incidents  Thrombocytopenia of unclear etiology  Frequent falls  Ischemic cardiomyopathy  Acute on chronic stage 3 kidney disease  Hypokalemia  LFT elevation    PLAN:   IV cardizem per Cardiology  2 liter fluid restriction and daily weights  Potassium 40 mEq q 12 hours po x 2  Recheck all labs in am including BNP and mag  Discharge planning per CM, patient lives alone, step daughter nearby  Continue PT, begin [address] discussed with:   Patient, RN    Signed By: Landon Aquino NP     March 2, 2018

## 2018-03-02 NOTE — PROGRESS NOTES
Problem: Falls - Risk of  Goal: *Absence of Falls  Document [facility]am[facility] Fall Risk and appropriate interventions in the flowsheet. Outcome: Progressing Towards Goal  Fall Risk Interventions:  Mobility Interventions: Bed/chair exit alarm, Patient to call before getting OOB, Communicate number of staff needed for ambulation/transfer         Medication Interventions: Evaluate medications/consider consulting pharmacy, Patient to call before getting OOB, Teach patient to arise slowly    Elimination Interventions: Patient to call for help with toileting needs, Toileting schedule/hourly rounds, Toilet paper/wipes in reach, Call light in reach    History of Falls Interventions: Investigate reason for fall, Evaluate medications/consider consulting pharmacy    Patient progressing towards goal with no falls on current admission. Patient without confusion, agitation, or sensory perception deficits. Patient has steady gait on ambulation. Personal belongings are within reach. Bed is in the low and locked position with side rails up x2. Yellow gripper socks to bilateral feet. Call light within reach and patient verbalizes understanding of use.

## 2018-03-02 NOTE — PROGRESS NOTES
[facility] CARDIOLOGY PROGRESS NOTE           3/2/2018 5:45 PM    Admit Date: 3/1/2018         Subjective: 69 yom presented with exacerbation of COPD, sCHF, in aflutter, converted.   Diuresed overnight breathing improved    ROS:  Cardiovascular:  As noted above    Objective:      Vitals:    03/02/18 0940 03/02/18 1409 03/02/18 1556 03/02/18 1731   BP: 129/72 102/56  101/60   Pulse: 81 81  83   Resp: 17 16  16   Temp: 98.4 °F (36.9 °C) 98.4 °F (36.9 °C)  97.8 °F (36.6 °C)   SpO2: 90% 93% 91% 93%   Weight:       Height:           On telemetry:      Physical Exam:  General: Well Developed, Well Nourished, No Acute Distress, Alert & Oriented x 3, Appropriate mood  Neck: supple, no JVD  Heart: S1S2 with RRR without murmurs or gallops  Lungs: Clear throughout auscultation bilaterally without adventitious sounds  Abd: soft, nontender, nondistended, with good bowel sounds  Ext: no edema bilaterally  Skin: warm and dry      Data Review:   Recent Labs      03/02/18   0350  03/01/18   0949   NA  142  142   K  3.0*  3.7   MG  1.9   --    BUN  18  17   CREA  1.52*  1.63*   GLU  138*  173*   WBC  5.0  5.6   HGB  11.3*  12.1*   HCT  34.5*  36.5*   PLT  133*  118*       Recent Labs      03/01/18   0949   TROIQ  <0.02*         Tele: NSR    Assessment/Plan:     Principal Problem:    Acute on chronic combined systolic and diastolic CHF (congestive heart failure) (HCC) (3/1/2018)    Active Problems:    HTN (hypertension) (12/14/2014)      Type 2 diabetes mellitus with stage 3 chronic kidney disease, without long-term current use of insulin (HCC) (7/1/2017)      COPD (chronic obstructive pulmonary disease) ([facility] [address] (7/1/2017)      CAD (coronary artery disease) (12/14/2014)      Combined systolic and diastolic congestive heart failure (HCC) (7/1/2017)      Ischemic cardiomyopathy (7/1/2017)      COPD with acute exacerbation (HCC) (3/1/2018)      Stage 3 chronic kidney disease (3/1/2018)      Acute respiratory failure with hypoxia ([address] (3/1/2018)      LFT elevation (3/1/2018)      Thrombocytopenia ([address] (3/1/2018)      Atrial flutter with rapid ventricular response ([address] (3/1/2018)    1) Aflutter - now in SR, no anticoag 2/2 head bleed. 2) CHF - continue diuresis with IV lasix, hold any ARB/ACE-I  3) Hypokalemia - repletion with po K  4) CAD - statin and aspirin  5) Thrombocytopenia/ LFTs per IM team.    Possible d/c on 3/3 if breathing continues to improve.       Shannan Mac MD  3/2/2018 5:45 PM

## 2018-03-02 NOTE — PROGRESS NOTES
Bedside and Verbal shift change report given to Rick Vallejo RN (oncoming nurse) by self Shaggy ferris). Report included the following information SBAR, Kardex, MAR and Recent Results.

## 2018-03-02 NOTE — PROGRESS NOTES
Bedside and Verbal shift change report given to [address] Blanca Hickman (oncoming nurse) by self and Beverly Bowles (offgoing nurse). Report included the following information SBAR, ED Summary, Intake/Output, MAR, Recent Results and Cardiac Rhythm A-flutter.

## 2018-03-02 NOTE — PROGRESS NOTES
Visit by spiritual volunteer Isael Reagan, staff [address] [facility]  /   [email or web address]

## 2018-03-02 NOTE — DIABETES MGMT
Patient admitted with CHF, seen by diabetes educator. Patient states he doesn't know of any family history of diabetes, but that he has been diabetic for over ten years. Patient states he is compliant with his regimen of glimepiride 2 mg daily which he thinks he has been taking for \"several years\". Patient reports he could not get his One Touch glucometer to work so he has not been checking his blood glucose at home. Patient also recently switched providers. His current PCP is Dr. Preeti Cooper. Educated patient that his HgA1c has increased from 6.8 in September to its current 8.1 (eA). Reviewed current regimen of Humalog SSI with patient. Blood glucose 173 on admission. Blood glucose ranged 140-230 yesterday with patient receiving Humalog 4 units. Blood glucose 121-144 today. Reviewed with patient. Provided patient with new One Touch Verio Flex glucometer, demonstrated how to use glucometer. Patient will need prescription for glucometer supplies at discharge. Encouraged patient to follow up with his PCP at discharge with blood glucose log for further titration of his diabetic regimen. Patient verbalized understanding.

## 2018-03-02 NOTE — PROGRESS NOTES
Bedside and Verbal shift change report given to self and DANIELLE Jerome(oncoming nurse) by Julia Beckwith (offgoing nurse). Report included the following information SBAR, ED Summary, Intake/Output, MAR, Recent Results and Cardiac Rhythm A-flutter.

## 2018-03-03 NOTE — PROGRESS NOTES
Bedside and Verbal shift change report given to self and Ruthie Davis RN (oncoming nurse) by Rafia Irvin and Jeane rincon RN (offgoing nurse).  Report included the following information SBAR, Intake/Output, MAR, Recent Results and Cardiac Rhythm SR.

## 2018-03-03 NOTE — ROUTINE PROCESS
CHF teaching started post introduction to pt.; aware of diagnosis. Planner/scale(home) @ BS and will follow. Smoking/ ETOH/Illicit drug use cessation and maintain a healthy weight covered. Pt. aware that I can not prescribe nor adjust  medications: 15mins  Palliative Care score:39, entered  Start 2L/D Fluid restriction  CHF teaching continues to pt. . Emphasis on taking prescription meds as ordered, to keep F/U appts and to call MD STAT if any of the following occur:   If you gain 2 lbs in one day or 5 lbs in a week, and short of breath.  If you can not lay flat without developing short of breath or rapid breathing at night; or if it wakes you up. Develop a cough or wheezing.  If you notice swollen hands/feet/ankles or stomach with a bloated/ full feeling.  If you become confused or mentally fuzzy or dizzy.  If you notice a rapid or change in your heart rate.  If you become more exhausted all the time and unable to do the same level of activity without stopping to catch your breath. Drink no more than 8 cups a day in 8 oz. cups. Your Heart can not handle any more. Stay away from salt (limit anything with salt or sodium in it). Limit to 250mg per serving.   Pt. verbalizes understanding, will follow to reinforce teaching skills: 20 mins    Reinforce

## 2018-03-03 NOTE — PROGRESS NOTES
Bedside and Verbal shift change report given to Andrae Diaz RN (oncoming nurse) by self and Cristi Galvan RN (offgoing nurse).  Report included the following information SBAR, Intake/Output, MAR, Recent Results and Cardiac Rhythm SR.

## 2018-03-03 NOTE — PROGRESS NOTES
Problem: Mobility Impaired (Adult and Pediatric)  Goal: *Acute Goals and Plan of Care (Insert Text)  LTG:  (1.)Mr. Marivel Reid will move from supine to sit and sit to supine , scoot up and down and roll side to side in bed with MODIFIED INDEPENDENCE within 7 treatment day(s). (2.)Mr. Mraivel Reid will transfer from bed to chair and chair to bed with SUPERVISION using the least restrictive device within 7 treatment day(s). (3.)Mr. Marivel Reid will ambulate with STAND BY ASSIST for >cm=277 feet with the least restrictive device, appropriate gait pattern and good dynamic standing balance within 7 treatment day(s). (4.)Mr. Marivel Reid will perform B LE therapeutic exercises x 20 reps with INDEPENDENCE within 7 days to increase strength for improved safety and independence in transfers and gait.  ________________________________________________________________________________________________      PHYSICAL THERAPY: Initial Assessment 3/3/2018  INPATIENT: Hospital Day: 3  Payor: Leoncio Alarcon / Plan: [facility] HUMANA MEDICARE CHOICE PPO/PFFS / Product Type: [employer] Medicare /      NAME/AGE/GENDER: Soha Sterling is a 69 y.o. male   PRIMARY DIAGNOSIS: COPD with acute exacerbation ([address]  Acute on chronic combined systolic and diastolic CHF (congestive heart failure) ([facility] [address]  COPD with acute exacerbation (HCC)  Acute on chronic combined systolic and diastolic CHF (congestive heart failure) (HCC)  Afib (HCC) Acute on chronic combined systolic and diastolic CHF (congestive heart failure) (HCC) Acute on chronic combined systolic and diastolic CHF (congestive heart failure) ([facility] [address]        ICD-10: Treatment Diagnosis:   · Generalized Muscle Weakness (M62.81)  · Difficulty in walking, Not elsewhere classified (R26.2)  · Repeated Falls (R29.6)  · History of falling (Z91.81)   Precaution/Allergies:  Mylanta [aluminum-magnesium hydroxide]      ASSESSMENT:     Mr. Marivel Reid presents sitting on edge of bed requesting assistance to use urinal. Pt pleasant and cooperative, oriented to person/place. Pt assisted to standing with min assist and with min assist to maintain balance, pt was able to use urinal independently. Pt then ambulated 20' with no assistive device and min assist for balance and safety. Pt fatigued quickly. Pt positioned in chair for comfort with chair alarm ON and call bell in reach. Pt admits to having had several recent falls, one severe. Recommended pt use his straight cane to assist with safety and reduce risk of falls. Might even benefit from rolling walker until stronger. Will con't to follow to maximize function. This section established at most recent assessment   PROBLEM LIST (Impairments causing functional limitations):  1. Decreased Strength  2. Decreased ADL/Functional Activities  3. Decreased Transfer Abilities  4. Decreased Ambulation Ability/Technique  5. Decreased Balance  6. Decreased Activity Tolerance  7. Increased Shortness of Breath  8. Decreased [place] with Home Exercise Program   INTERVENTIONS PLANNED: (Benefits and precautions of physical therapy have been discussed with the patient.)  1. Balance Exercise  2. Bed Mobility  3. Family Education  4. Gait Training  5. Home Exercise Program (HEP)  6. Therapeutic Activites  7. Therapeutic Exercise/Strengthening  8. Transfer Training  9. Group Therapy     TREATMENT PLAN: Frequency/Duration: 3 times a week for duration of hospital stay  Rehabilitation Potential For Stated Goals: Good     RECOMMENDED REHABILITATION/EQUIPMENT: (at time of discharge pending progress): Due to the probability of continued deficits (see above) this patient will likely need continued skilled physical therapy after discharge. Equipment:    will con't to assess              HISTORY:   History of Present Injury/Illness (Reason for Referral):  Per chart: Pt is a 70 y/o M with DM, cCHF, COPD, who presented to ER with SOB, cough semi-productive of whitish sputum, and worsening leg swelling.   Says symptoms started roughly 3 days ago and progressively worsened. SOB worse with exertion, laying down. Better with rest.  Tachycardic in ER. BNP 2400, baseline possibly around 900 but limited data in our chart. Denies CP, palpitations, HA, fevers, chills, n/v, diaphoresis, urinary or bowel changes. Past Medical History/Comorbidities:   Mr. Karoline Randall  has a past medical history of Asthma; CAD (coronary artery disease); Chronic subdural hematoma ([address] (3/1/2018); COPD; Diabetes ([address] Endocrine disease; Gastrointestinal disorder; Heart failure ([address] Hypertension; Other ill-defined conditions(799.89); and PUD (peptic ulcer disease). He also has no past medical history of Arthritis; Autoimmune disease ([address] Cancer ([address] Chronic kidney disease; DEMENTIA; Infectious disease; Liver disease; Psychiatric disorder; Seizures ([facility] [address] Sleep disorder; Stroke [facility]); or Thromboembolus ([facility] [address] Mr. Karoline Randall  has a past surgical history that includes pr cardiac surg procedure unlist.  Social History/Living Environment:   Home Environment: Private residence  # Steps to Enter: 0  Wheelchair Ramp: Yes  One/Two Story Residence: One story  Living Alone: Yes  Support Systems: Friends \ neighbors, Family member(s)  Patient Expects to be Discharged to[de-identified] Private residence  Current DME Used/Available at Home: [employer]ose, straight, Walker, [address] chair  Tub or Shower Type: Shower  Prior Level of Function/Work/Activity:  Pt was independent with all functional mobility and gait without assistive device. Uses a straight cane occasionally. Has had multiple recent falls. Drives. Step-Daughter lives next door and helps pt as needed. Works cleaning buildings occasionally. One level home with ramp entry. Personal Factors:          Sex:  male        Age:  69 y.o.    Number of Personal Factors/Comorbidities that affect the Plan of Care: 1-2: MODERATE COMPLEXITY   EXAMINATION:   Most Recent Physical Functioning:   Gross Assessment:  AROM: Generally decreased, functional  Strength: Generally decreased, functional  Coordination: Generally decreased, functional  Sensation: Intact               Posture:  Posture (WDL): Exceptions to WDL  Posture Assessment: Forward head, Rounded shoulders  Balance:  Sitting: Intact  Standing: Impaired  Standing - Static: Fair  Standing - Dynamic : Fair Bed Mobility:  Rolling:  (bed mobility not tested)  Wheelchair Mobility:     Transfers:  Sit to Stand: Minimum assistance  Stand to Sit: Contact guard assistance  Bed to Chair: Minimum assistance  Gait:     Base of Support: Widened  Speed/Vanessa: Slow  Gait Abnormalities: Decreased step clearance (forward trunk flexion)  Distance (ft): 20 Feet (ft)  Ambulation - Level of Assistance: Minimal assistance      Body Structures Involved:  1. Heart Body Functions Affected:  1. Cardio Activities and Participation Affected:  1. General Tasks and Demands  2. Mobility  3. Domestic Life   Number of elements that affect the Plan of Care: 4+: HIGH COMPLEXITY   CLINICAL PRESENTATION:   Presentation: Evolving clinical presentation with changing clinical characteristics: MODERATE COMPLEXITY   CLINICAL DECISION MAKIN [facility] Short Form  How much difficulty does the patient currently have. .. Unable A Lot A Little None   1. Turning over in bed (including adjusting bedclothes, sheets and blankets)? [] 1   [] 2   [x] 3   [] 4   2. Sitting down on and standing up from a chair with arms ( e.g., wheelchair, bedside commode, etc.)   [] 1   [] 2   [x] 3   [] 4   3. Moving from lying on back to sitting on the side of the bed? [] 1   [] 2   [x] 3   [] 4   How much help from another person does the patient currently need. .. Total A Lot A Little None   4. Moving to and from a bed to a chair (including a wheelchair)? [] 1   [] 2   [x] 3   [] 4   5. Need to walk in hospital room? [] 1   [] 2   [x] 3   [] 4   6. Climbing 3-5 steps with a railing?    [] 1 [x] 2   [] 3   [] 4   © 2007, Trustees of [facility] [employer], under license to [email or web address]. All rights reserved      Score:  Initial: 17 Most Recent: X (Date: -- )    Interpretation of Tool:  Represents activities that are increasingly more difficult (i.e. Bed mobility, Transfers, Gait). Score 24 23 22-20 19-15 14-10 9-7 6     Modifier CH CI CJ CK CL CM CN      ? Mobility - Walking and Moving Around:     - CURRENT STATUS: CK - 40%-59% impaired, limited or restricted    - GOAL STATUS: CK - 40%-59% impaired, limited or restricted    - D/C STATUS:  ---------------To be determined---------------  Payor: HUMANA MEDICARE / Plan: [facility] HUMANA MEDICARE CHOICE PPO/PFFS / Product Type: [employer] Medicare /      Medical Necessity:     · Patient demonstrates good rehab potential due to higher previous functional level. Reason for Services/Other Comments:  · Patient continues to require present interventions due to patient's inability to function at baseline. Use of outcome tool(s) and clinical judgement create a POC that gives a: Questionable prediction of patient's progress: MODERATE COMPLEXITY            TREATMENT:   (In addition to Assessment/Re-Assessment sessions the following treatments were rendered)   Pre-treatment Symptoms/Complaints:  Requesting to use bathroom  Pain: Initial:   Pain Intensity 1: 0  Post Session:  Unchanged 0/10     Assessment/Reassessment only, no treatment provided today    Braces/Orthotics/Lines/Etc:   · IV  · O2 Device: Room air  Treatment/Session Assessment:    · Response to Treatment:  Fatigued quickly, but tolerated with without difficulty. · Interdisciplinary Collaboration:   o Physical Therapist  o Registered Nurse  · After treatment position/precautions:   o Up in chair  o Bed alarm/tab alert on  o Bed/Chair-wheels locked  o Bed in low position  o Call light within reach  o RN notified   · Compliance with Program/Exercises:  Will assess as treatment progresses. · Recommendations/Intent for next treatment session: \"Next visit will focus on advancements to more challenging activities and reduction in assistance provided\".   Total Treatment Duration:  PT Patient Time In/Time Out  Time In: 1137  Time Out: [address], PT

## 2018-03-03 NOTE — PROGRESS NOTES
Hospitalist Progress Note    Subjective:   Daily Progress Note: 3/3/2018 10:09 AM    Patient presented to ER 3/1/18 with complaints of productive cough of thick white sputum x 2 weeks, orthopnea, , wheezing, progressively worsening shortness of breath, and bilateral lower extremity edema that began am of presentation. Denied recent or current illness or exposure, no fever. Oxygen and duoneb en route per EMS with some relief. Known COPD and asthma, CAD, CHF. Heart rate is 123 on admission to ER. Wheezes and basilar rales noted. +3 pitting edema to bilateral lower extremities, left >right. Negative troponin. H/H: 12.1/36.5, platelets: 801, glucose: 173, creatinine: 1.63 with gfr: 44. BNP: 2400, baseline 900. No chest pain  Found in atrial flutter with RVR. Last EKG 9/2017 in NSR. Admitted to tele with IV lasix, daily weights, mayorga, strict I+O, nebs, SSI. Refused AICD in past.  2016 Echocardiogram 25-30% EF, holding home meds. IV diltiazem begun. Takes amiodarone at home. He was recently admitted with unstable angina. Seen by Cardiology. A1C: 8.1     3/2:  Seen by Diabetes educator A1C was 6.8 in September of 2017, currently 8.1 on glimepiride 2 mg daily x several years. Reports compliance, does not check glucose. 3/3:  Not diuresing much. K+ up to 3.7, mag 1.7. States is feeling \"some better\" but still short of breath with minimal exertion. Lasix increased per Cards to 80 mg bid today. IV cardizem transitioned to po.   Can not anticoagulate per Cardiology     Additional history:  Asthma, COPD, CAD with CABG x 2 episodes (last 2003), cardiac stents x 2 2014, CHF, NIDDM x 10 years with current A1C 8.1, prior 6.8, CAD, hyperlipidemia, PUD, chronic subdural hematoma requiring surgery, frequent falls,     Current Facility-Administered Medications   Medication Dose Route Frequency    furosemide (LASIX) injection 80 mg  80 mg IntraVENous BID    magnesium sulfate 2 g/50 ml IVPB (premix or compounded)  2 g IntraVENous ONCE    dilTIAZem CD (CARDIZEM CD) capsule 120 mg  120 mg Oral DAILY    potassium chloride (K-DUR, KLOR-CON) SR tablet 40 mEq  40 mEq Oral BID    ALPRAZolam (XANAX) tablet 0.25 mg  0.25 mg Oral BID PRN    aspirin chewable tablet 81 mg  81 mg Oral DAILY    carvedilol (COREG) tablet 25 mg  25 mg Oral BID WITH MEALS    levETIRAcetam (KEPPRA) tablet 750 mg  750 mg Oral BID    montelukast (SINGULAIR) tablet 10 mg  10 mg Oral DAILY    rosuvastatin (CRESTOR) tablet 20 mg  20 mg Oral QHS    tiotropium (SPIRIVA) inhalation capsule 18 mcg  1 Cap Inhalation DAILY    bisacodyl (DULCOLAX) tablet 5 mg  5 mg Oral DAILY PRN    guaiFENesin-dextromethorphan (ROBITUSSIN DM) 100-10 mg/5 mL syrup 10 mL  10 mL Oral Q4H PRN    hydrALAZINE (APRESOLINE) 20 mg/mL injection 20 mg  20 mg IntraVENous Q6H PRN    polyethylene glycol (MIRALAX) packet 17 g  17 g Oral DAILY PRN    simethicone (MYLICON) tablet 80 mg  80 mg Oral QID PRN    sodium chloride (NS) flush 5-10 mL  5-10 mL IntraVENous Q8H    sodium chloride (NS) flush 5-10 mL  5-10 mL IntraVENous PRN    acetaminophen (TYLENOL) tablet 650 mg  650 mg Oral Q4H PRN    HYDROcodone-acetaminophen (NORCO) 5-325 mg per tablet 1 Tab  1 Tab Oral Q4H PRN    naloxone (NARCAN) injection 0.4 mg  0.4 mg IntraVENous PRN    diphenhydrAMINE (BENADRYL) injection 25 mg  25 mg IntraVENous Q4H PRN    ondansetron (ZOFRAN) injection 4 mg  4 mg IntraVENous Q4H PRN    senna-docusate (PERICOLACE) 8.6-50 mg per tablet 2 Tab  2 Tab Oral DAILY PRN    LORazepam (ATIVAN) tablet 1 mg  1 mg Oral Q4H PRN    zolpidem (AMBIEN) tablet 5 mg  5 mg Oral QHS PRN    insulin lispro (HUMALOG) injection   SubCUTAneous AC&HS    dextrose 40% (GLUTOSE) oral gel 1 Tube  15 g Oral PRN    glucagon (GLUCAGEN) injection 1 mg  1 mg IntraMUSCular PRN    dextrose (D50W) injection syrg 12.5-25 g  25-50 mL IntraVENous PRN    albuterol (PROVENTIL VENTOLIN) nebulizer solution 2.5 mg  2.5 mg Nebulization Q4H PRN    albuterol (PROVENTIL VENTOLIN) nebulizer solution 2.5 mg  2.5 mg Nebulization Q8H RT        Review of Systems  A comprehensive review of systems was negative except for that written in the HPI. Objective:     Visit Vitals    /65 (BP 1 Location: Left arm, BP Patient Position: At rest)    Pulse 79    Temp 97.7 °F (36.5 °C)    Resp 18    Ht 6' 3\" (1.905 m)    Wt 90.1 kg (198 lb 11.2 oz)    SpO2 95%    BMI 24.84 kg/m2    O2 Flow Rate (L/min): 2 l/min O2 Device: Room air    Temp (24hrs), Av.8 °F (36.6 °C), Min:97.4 °F (36.3 °C), Max:98.4 °F (36.9 °C)    701 -  190  In: -   Out: 131 [EHYUQ:260]  1901 -  0700  In: 165 [P.O.:165]  Out: 2150 [Urine:2150]    General appearance: Oriented and alert, cooperative, reports is feeling better, still SOB with minimal exertion   Head: Normocephalic, without obvious abnormality, atraumatic  Eyes: conjunctivae/corneas clear. PERRL  Throat: Lips, mucosa, and tongue normal. Teeth and gums normal  Neck: supple, symmetrical, trachea midline, no adenopathy, no carotid bruit and no JVD  Lungs: clear to auscultation bilaterally with rare scattered wheezes. Continued harsh, wheezy cough  Heart: Irregular rate and rhythm, S1, S2 normal, no murmur, click, rub or gallop  Abdomen: soft, non-tender.  Bowel sounds normal. No masses,  no organomegaly  Extremities: extremities normal, atraumatic, no cyanosis or edema  Pulses: 2+ and symmetric  Skin: Skin color, texture, turgor normal. No rashes or lesions  Neurologic: Grossly normal     Additional comments:  Orders, notes, test results, vitals reviewed    Data Review  Recent Results (from the past 24 hour(s))   GLUCOSE, POC    Collection Time: 18 10:56 AM   Result Value Ref Range    Glucose (POC) 144 (H) 65 - 100 mg/dL   GLUCOSE, POC    Collection Time: 18  4:43 PM   Result Value Ref Range    Glucose (POC) 271 (H) 65 - 100 mg/dL   GLUCOSE, POC    Collection Time: 18 8:38 PM   Result Value Ref Range    Glucose (POC) 128 (H) 65 - 561 mg/dL   METABOLIC PANEL, BASIC    Collection Time: 03/03/18  3:54 AM   Result Value Ref Range    Sodium 139 136 - 145 mmol/L    Potassium 3.7 3.5 - 5.1 mmol/L    Chloride 104 98 - 107 mmol/L    CO2 27 21 - 32 mmol/L    Anion gap 8 7 - 16 mmol/L    Glucose 179 (H) 65 - 100 mg/dL    BUN 20 8 - 23 MG/DL    Creatinine 1.43 0.8 - 1.5 MG/DL    GFR est AA >60 >60 ml/min/1.73m2    GFR est non-AA 51 (L) >60 ml/min/1.73m2    Calcium 8.4 8.3 - 10.4 MG/DL   CBC W/O DIFF    Collection Time: 03/03/18  3:54 AM   Result Value Ref Range    WBC 5.6 4.3 - 11.1 K/uL    RBC 4.49 4.23 - 5.67 M/uL    HGB 11.0 (L) 13.6 - 17.2 g/dL    HCT 33.7 (L) 41.1 - 50.3 %    MCV 75.1 (L) 79.6 - 97.8 FL    MCH 24.5 (L) 26.1 - 32.9 PG    MCHC 32.6 31.4 - 35.0 g/dL    RDW 15.4 (H) 11.9 - 14.6 %    PLATELET 562 (L) 133 - 450 K/uL    MPV Cannot be calculated 10.8 - 14.1 FL   BNP    Collection Time: 03/03/18  3:54 AM   Result Value Ref Range     pg/mL   MAGNESIUM    Collection Time: 03/03/18  3:54 AM   Result Value Ref Range    Magnesium 1.7 (L) 1.8 - 2.4 mg/dL   GLUCOSE, POC    Collection Time: 03/03/18  6:07 AM   Result Value Ref Range    Glucose (POC) 213 (H) 65 - 100 mg/dL     CXR: 3/1/18:  Postoperative changes from CABG with enlarged cardiac silhouette. Left basal scarring is stable. No acute pulmonary edema or infiltrate. No pleural effusion or pneumothorax. IMPRESSION: Stable chest x-ray with chronic findings. No acute abnormality.     3/1/18:  ABDOMINAL ULTRASOUND:  The liver, pancreas, spleen, and both kidneys are of normal size and echogenicity. The abdominal aorta, IVC, portal venous doppler studies are unremarkable. Impression: Bilateral renal cysts     ECHOCARDIOGRAM:  -  Left ventricle: The ventricle was moderately dilated. The ventricle was severely dilated. Systolic function was severely reduced. Ejection fraction  Was estimated in the range of 10 % to 15 %.  SVi= 17.8. There was severe diffuse hypokinesis with regional variations. -  Ventricular septum: There was paradoxical motion. -  Right ventricle: The ventricle was dilated.  Systolic function was markedly reduced.     Assessment/Plan:   Shortness of breath  Acute respiratory failure with hypoxia   Atrial flutter with RVR, not a candidate for oral AC  Orthopnea  Distal edema  Acute on chronic combined systolic and diastolic CHF last EF: 54%, CURRENT 10-15%  HTN   NIDDM, A1C now 8.1, 6 months ago: 6.8  Chronic subdural hematoma with history of neurosurgery evacuation  Acute exacerbation of COPD  CAD with history of CABG x 2 incidents  Thrombocytopenia of unclear etiology  Frequent falls  Ischemic cardiomyopathy  Acute on chronic stage 3 kidney disease  Hypokalemia  LFT elevation    PLAN:   Mag replaced by Dr Abril Farfan daily labs with mag and BNP  Potassium now stable, will monitor   Recheck LFTs Monday 3/5    Care Plan discussed with: Patient, RN    Signed By: Sarah De La Rosa NP     March 3, 2018

## 2018-03-03 NOTE — PROGRESS NOTES
[facility] CARDIOLOGY PROGRESS NOTE           3/3/2018 8:03 AM    Admit Date: 3/1/2018         Subjective: 69 yom with hx of sCHF presented in atrial arrhythmia. No in NSR needs further diuresis. Continues to be SOB, poor output yesterday.     ROS:  Cardiovascular:  As noted above    Objective:      Vitals:    03/02/18 2305 03/03/18 0100 03/03/18 0239 03/03/18 0425   BP:  90/52 106/57 99/64   Pulse:  94 80 79   Resp:  20 18 20   Temp:  97.6 °F (36.4 °C)  97.4 °F (36.3 °C)   SpO2: 92% 94%  95%   Weight:    90.1 kg (198 lb 11.2 oz)   Height:           On telemetry:      Physical Exam:  General: Well Developed, Well Nourished, No Acute Distress, Alert & Oriented x 3, Appropriate mood  Neck: supple, no JVD  Heart: S1S2 with RRR without murmurs or gallops  Lungs: Crackles  Abd: soft, nontender, nondistended, with good bowel sounds  Ext: 2+ edema bilaterally  Skin: warm and dry      Data Review:   Recent Labs      03/03/18   0354  03/02/18   0350   NA  139  142   K  3.7  3.0*   MG  1.7*  1.9   BUN  20  18   CREA  1.43  1.52*   GLU  179*  138*   WBC  5.6  5.0   HGB  11.0*  11.3*   HCT  33.7*  34.5*   PLT  142*  133*       Recent Labs      03/01/18   0949   TROIQ  <0.02*           Assessment/Plan:     Principal Problem:    Acute on chronic combined systolic and diastolic CHF (congestive heart failure) ([facility]) (3/1/2018)    Active Problems:    HTN (hypertension) (12/14/2014)      Type 2 diabetes mellitus with stage 3 chronic kidney disease, without long-term current use of insulin (HCC) (7/1/2017)      COPD (chronic obstructive pulmonary disease) ([facility]) (7/1/2017)      CAD (coronary artery disease) (12/14/2014)      Combined systolic and diastolic congestive heart failure (HCC) (7/1/2017)      Ischemic cardiomyopathy (7/1/2017)      COPD with acute exacerbation (HCC) (3/1/2018)      Stage 3 chronic kidney disease (3/1/2018)      Acute respiratory failure with hypoxia (HCC) (3/1/2018)      LFT elevation (3/1/2018)      Thrombocytopenia ([facility] [address] (3/1/2018)      Atrial flutter with rapid ventricular response ([address] (3/1/2018)    1) CHF exacerbation - will increase diuresis dosing today lasix 80 BID  2) Hypomag - 2 g IV now  3) COPD - per primary team.  4) Aflutter - back in NSR, cannot anticoagulate, will stop IV dilt continue PO      Checo Snyder MD  3/3/2018 8:03 AM

## 2018-03-03 NOTE — PROGRESS NOTES
Called and spoke with May Al NP regarding duplicate orders for K-dur 40mEq. Received telephone orders with read-back to discontinue duplicate so that patient is receiving a total of 80mEq of potassium each day.

## 2018-03-04 NOTE — PROGRESS NOTES
[facility] CARDIOLOGY PROGRESS NOTE           3/4/2018 8:07 AM    Admit Date: 3/1/2018         Subjective: Increase lasix yesterday without significant response, weight increasing, episode of alfutter again last night.   Denies CP, still has LE edema    ROS:  Cardiovascular:  As noted above    Objective:      Vitals:    03/04/18 0233 03/04/18 0437 03/04/18 0516 03/04/18 0806   BP: 101/68 100/71 100/71    Pulse: 90 (!) 119 (!) 118    Resp:  17     Temp:  97.8 °F (36.6 °C)     SpO2:  94%  92%   Weight:  90.9 kg (200 lb 8 oz)     Height:           On telemetry:      Physical Exam:  General: Well Developed, Well Nourished, No Acute Distress, Alert & Oriented x 3, Appropriate mood  Neck: supple, no JVD  Heart: S1S2 with RRR without murmurs or gallops  Lungs: Clear throughout auscultation bilaterally without adventitious sounds  Abd: soft, nontender, nondistended, with good bowel sounds  Ext: 1+ edema bilaterally  Skin: warm and dry      Data Review:   Recent Labs      03/04/18   0416  03/03/18   0354  03/02/18   0350   NA  141  139  142   K  4.3  3.7  3.0*   MG  2.3  1.7*  1.9   BUN  26*  20  18   CREA  1.58*  1.43  1.52*   GLU  141*  179*  138*   WBC   --   5.6  5.0   HGB   --   11.0*  11.3*   HCT   --   33.7*  34.5*   PLT   --   142*  133*       Recent Labs      03/01/18   0949   TROIQ  <0.02*         Tele - NSR    Assessment/Plan:     Principal Problem:    Acute on chronic combined systolic and diastolic CHF (congestive heart failure) (HCC) (3/1/2018)    Active Problems:    HTN (hypertension) (12/14/2014)      Type 2 diabetes mellitus with stage 3 chronic kidney disease, without long-term current use of insulin ([facility] [address] (7/1/2017)      COPD (chronic obstructive pulmonary disease) [facility] (7/1/2017)      CAD (coronary artery disease) (12/14/2014)      Combined systolic and diastolic congestive heart failure (HCC) (7/1/2017)      Ischemic cardiomyopathy (7/1/2017)      COPD with acute exacerbation (HCC) (3/1/2018)      Stage 3 chronic kidney disease (3/1/2018)      Acute respiratory failure with hypoxia (HCC) (3/1/2018)      LFT elevation (3/1/2018)      Thrombocytopenia (HCC) (3/1/2018)      Atrial flutter with rapid ventricular response ([address] (3/1/2018)    1) CHF - remains fluid overloaded, will try adding metolazone today if he fails to respond will need nephrology on board  2) Aflutter - back in NSR feel CHF is contributing, increase cardiazem to 180 daily  3) MITA - will follow labs daily  4) SOB - feel that COPD may be contributing more than heart failure but will wait for diuresis response    lBanca Quiñonez MD  3/4/2018 8:07 AM

## 2018-03-04 NOTE — PROGRESS NOTES
Bedside and Verbal shift change report given to self and Eva Moe RN (oncoming nurse) by Aris Deleon RN (offgoing nurse).  Report included the following information SBAR, Procedure Summary, Intake/Output, MAR, Recent Results and Cardiac Rhythm SR.

## 2018-03-04 NOTE — PROGRESS NOTES
Bedside and Verbal shift change report given to Sergio Murry RN (oncoming nurse) by self and Abelino Rand RN (offgoing nurse). Report included the following information SBAR, Intake/Output, MAR, Recent Results and Cardiac Rhythm Sinus/A-flutter.

## 2018-03-04 NOTE — PROGRESS NOTES
Hospitalist Progress Note    Subjective:   Daily Progress Note: 3/4/2018 4:31 PM    Patient presented to ER 3/1/18 with complaints of productive cough of thick white sputum x 2 weeks, orthopnea, , wheezing, progressively worsening shortness of breath, and bilateral lower extremity edema that began am of presentation.  Denied recent or current illness or exposure, no fever.  Oxygen and duoneb en route per EMS with some relief.  Known COPD and asthma, CAD, CHF.  Heart rate is 123 on admission to ER. Lenice Curl and basilar rales noted.  +3 pitting edema to bilateral lower extremities, left >right.  Negative troponin. H/H: 12.1/36.5, platelets: 128, glucose: 173, creatinine: 1.63 with gfr: 44.  BNP: 2400, baseline 900.  No chest pain  Found in atrial flutter with RVR. Last EKG 9/2017 in NSR.  Admitted to tele with IV lasix, daily weights, mayorga, strict I+O, nebs, SSI.  Refused AICD in past.  2016 Echocardiogram 25-30% EF, holding home meds.  IV diltiazem begun.  Takes amiodarone at home. He was recently admitted with unstable angina.  Seen by Cardiology. A1C: 8.1      3/2:  Seen by Diabetes educator A1C was 6.8 in September of 2017, currently 8.1 on glimepiride 2 mg daily x several years.  Reports compliance, does not check glucose.      3/3:  Not diuresing much. K+ up to 3.7, mag 1.7. States is feeling \"some better\" but still short of breath with minimal exertion. Lasix increased per Cards to 80 mg bid today. IV cardizem transitioned to po. Can not anticoagulate per Cardiology      3/4:  BNP up to 907 from 849 yesterday with creatinine same at 1.58. Glucose less labile. Working with PT, ambulated 20 feet. Quickly fatigued. Episode of a flutter 110-120 x 1.  cardizem increased to 120 mg daily. Persistent harsh cough with minimal expectorant. Improving diuresis. Spoke with sisters x 2 at length regarding test results and general condition. Frail.  They voice concern about patient's heavy snoring and probable DIVINE.     Additional history:  Asthma, COPD, CAD with CABG x 2 episodes (last 2003), cardiac stents x 2 2014, CHF, NIDDM x 10 years with current A1C 8.1, prior 6.8, CAD, hyperlipidemia, PUD, chronic subdural hematoma requiring surgery, frequent falls,     Current Facility-Administered Medications   Medication Dose Route Frequency    metOLazone (ZAROXOLYN) tablet 5 mg  5 mg Oral DAILY    [START ON 3/5/2018] dilTIAZem CD (CARDIZEM CD) capsule 180 mg  180 mg Oral DAILY    furosemide (LASIX) injection 80 mg  80 mg IntraVENous BID    albuterol-ipratropium (DUO-NEB) 2.5 MG-0.5 MG/3 ML  3 mL Nebulization Q6H RT    budesonide (PULMICORT) 500 mcg/2 ml nebulizer suspension  500 mcg Nebulization BID RT    insulin lispro (HUMALOG) injection   SubCUTAneous AC&HS    potassium chloride (K-DUR, KLOR-CON) SR tablet 40 mEq  40 mEq Oral BID    ALPRAZolam (XANAX) tablet 0.25 mg  0.25 mg Oral BID PRN    aspirin chewable tablet 81 mg  81 mg Oral DAILY    carvedilol (COREG) tablet 25 mg  25 mg Oral BID WITH MEALS    levETIRAcetam (KEPPRA) tablet 750 mg  750 mg Oral BID    montelukast (SINGULAIR) tablet 10 mg  10 mg Oral DAILY    rosuvastatin (CRESTOR) tablet 20 mg  20 mg Oral QHS    tiotropium (SPIRIVA) inhalation capsule 18 mcg  1 Cap Inhalation DAILY    bisacodyl (DULCOLAX) tablet 5 mg  5 mg Oral DAILY PRN    guaiFENesin-dextromethorphan (ROBITUSSIN DM) 100-10 mg/5 mL syrup 10 mL  10 mL Oral Q4H PRN    hydrALAZINE (APRESOLINE) 20 mg/mL injection 20 mg  20 mg IntraVENous Q6H PRN    polyethylene glycol (MIRALAX) packet 17 g  17 g Oral DAILY PRN    simethicone (MYLICON) tablet 80 mg  80 mg Oral QID PRN    sodium chloride (NS) flush 5-10 mL  5-10 mL IntraVENous Q8H    sodium chloride (NS) flush 5-10 mL  5-10 mL IntraVENous PRN    acetaminophen (TYLENOL) tablet 650 mg  650 mg Oral Q4H PRN    HYDROcodone-acetaminophen (NORCO) 5-325 mg per tablet 1 Tab  1 Tab Oral Q4H PRN    naloxone (NARCAN) injection 0.4 mg  0.4 mg IntraVENous PRN    diphenhydrAMINE (BENADRYL) injection 25 mg  25 mg IntraVENous Q4H PRN    ondansetron (ZOFRAN) injection 4 mg  4 mg IntraVENous Q4H PRN    senna-docusate (PERICOLACE) 8.6-50 mg per tablet 2 Tab  2 Tab Oral DAILY PRN    LORazepam (ATIVAN) tablet 1 mg  1 mg Oral Q4H PRN    zolpidem (AMBIEN) tablet 5 mg  5 mg Oral QHS PRN    dextrose 40% (GLUTOSE) oral gel 1 Tube  15 g Oral PRN    glucagon (GLUCAGEN) injection 1 mg  1 mg IntraMUSCular PRN    dextrose (D50W) injection syrg 12.5-25 g  25-50 mL IntraVENous PRN    albuterol (PROVENTIL VENTOLIN) nebulizer solution 2.5 mg  2.5 mg Nebulization Q4H PRN      Review of Systems  A comprehensive review of systems was negative except for that written in the HPI. Objective:     Visit Vitals    /71 (BP 1 Location: Left arm, BP Patient Position: At rest)    Pulse 80    Temp 97.3 °F (36.3 °C)    Resp 17    Ht 6' 3\" (1.905 m)    Wt 90.9 kg (200 lb 8 oz)    SpO2 94%    BMI 25.06 kg/m2    O2 Flow Rate (L/min): 2 l/min O2 Device: Room air    Temp (24hrs), Av.9 °F (36.6 °C), Min:97.3 °F (36.3 °C), Max:98.6 °F (37 °C)    701 -  190  In: 120 [P.O.:120]  Out: 1350 [CJTDZ:4338]  1901 -  0700  In: 070 [P.O.:799]  Out: 500 [Urine:500]    General appearance: Tired today. Oriented, but dozing most of conversation with sisters. Weak and frail. Cooperative, reports is feeling better, still SOB with minimal exertion   Head: Normocephalic, without obvious abnormality, atraumatic  Eyes: conjunctivae/corneas clear. PERRL  Throat: Lips, mucosa, and tongue normal. Teeth and gums normal  Neck: supple, symmetrical, trachea midline, no JVD  Lungs: clear to auscultation bilaterally with rare scattered wheezes. Continued harsh, wheezy cough  Heart: Irregular rate and rhythm, S1, S2 normal, no murmur, click, rub or gallop  Abdomen: soft, non-tender.  Bowel sounds normal. No masses,  no organomegaly  Extremities: extremities normal, atraumatic, no cyanosis or edema  Pulses: 2+ and symmetric  Skin: Skin color, texture, turgor normal. No rashes or lesions  Neurologic: Grossly normal      Additional comments:  Orders, notes, test results, vitals reviewed    Data Review  Recent Results (from the past 24 hour(s))   GLUCOSE, POC    Collection Time: 03/03/18  9:41 PM   Result Value Ref Range    Glucose (POC) 191 (H) 65 - 267 mg/dL   METABOLIC PANEL, BASIC    Collection Time: 03/04/18  4:16 AM   Result Value Ref Range    Sodium 141 136 - 145 mmol/L    Potassium 4.3 3.5 - 5.1 mmol/L    Chloride 106 98 - 107 mmol/L    CO2 25 21 - 32 mmol/L    Anion gap 10 7 - 16 mmol/L    Glucose 141 (H) 65 - 100 mg/dL    BUN 26 (H) 8 - 23 MG/DL    Creatinine 1.58 (H) 0.8 - 1.5 MG/DL    GFR est AA 55 (L) >60 ml/min/1.73m2    GFR est non-AA 46 (L) >60 ml/min/1.73m2    Calcium 8.7 8.3 - 10.4 MG/DL   MAGNESIUM    Collection Time: 03/04/18  4:16 AM   Result Value Ref Range    Magnesium 2.3 1.8 - 2.4 mg/dL   BNP    Collection Time: 03/04/18  4:16 AM   Result Value Ref Range     pg/mL   GLUCOSE, POC    Collection Time: 03/04/18  6:12 AM   Result Value Ref Range    Glucose (POC) 137 (H) 65 - 100 mg/dL   GLUCOSE, POC    Collection Time: 03/04/18 11:58 AM   Result Value Ref Range    Glucose (POC) 199 (H) 65 - 100 mg/dL   PLEASE READ & DOCUMENT PPD TEST IN 24 HRS    Collection Time: 03/04/18  2:26 PM   Result Value Ref Range    PPD negative Negative    mm Induration 0 mm       CXR: 3/1/18:  Postoperative changes from CABG with enlarged cardiac silhouette. Left basal scarring is stable. No acute pulmonary edema or infiltrate. No pleural effusion or pneumothorax. IMPRESSION: Stable chest x-ray with chronic findings. No acute abnormality.     3/1/18:  ABDOMINAL ULTRASOUND:  The liver, pancreas, spleen, and both kidneys are of normal size and echogenicity. The abdominal aorta, IVC, portal venous doppler studies are unremarkable.   Impression: Bilateral renal cysts     ECHOCARDIOGRAM:  -  Left ventricle: The ventricle was moderately dilated. The ventricle was severely dilated. Systolic function was severely reduced. Ejection fraction  Was estimated in the range of 10 % to 15 %. SVi= 17.8. There was severe diffuse hypokinesis with regional variations. -  Ventricular septum: There was paradoxical motion. -  Right ventricle: The ventricle was dilated. Systolic function was markedly reduced.     Assessment/Plan:   Shortness of breath  Acute respiratory failure with hypoxia   Atrial flutter with RVR, not a candidate for oral AC  Orthopnea  Distal edema  Acute on chronic combined systolic and diastolic CHF last EF: 76%, CURRENT 10-15%  HTN   NIDDM, A1C now 8.1, 6 months ago: 6.8  Chronic subdural hematoma with history of neurosurgery evacuation  Acute exacerbation of COPD  CAD with history of CABG x 2 incidents  Thrombocytopenia of unclear etiology  Frequent falls  Ischemic cardiomyopathy  Acute on chronic stage 3 kidney disease  Hypokalemia  LFT elevation    PLAN:   Labs in am  Consult pulmonary in am, patient not improving as expected  Thanks for Cardiology input  Overnight sleep study for probable DIVINE    Care Plan discussed with: patient's sisters x 2, patient at length, RN    Signed By: Miladys Moody NP     March 4, 2018

## 2018-03-04 NOTE — PROGRESS NOTES
Patient instructed on importance of using urinal EVERY time he urinates for strict I & O monitoring while on IVP Lasix. Verbalized understanding. Urinal placed at bedside and in bathroom.

## 2018-03-04 NOTE — PROGRESS NOTES
Patient noted to have gone into A-flutter in the 110s-120s, mainly sustaining at 118. Jose Eduardo Weiss NP paged and telephone orders with read-back received to move daily dose of Cardizem 120mg to give now.

## 2018-03-05 NOTE — PROGRESS NOTES
Bedside and Verbal shift change report given to Lloyd Banks RN (oncoming nurse) by self Mckya Marin nurse). Report included the following information SBAR, Kardex, Intake/Output, MAR and Recent Results.

## 2018-03-05 NOTE — PROGRESS NOTES
Problem: Self Care Deficits Care Plan (Adult)  Goal: *Acute Goals and Plan of Care (Insert Text)  1. Patient will complete lower body bathing and dressing with modified independence and adaptive equipment as needed. 2. Patient will complete toileting with independence. 3. Patient will tolerate 23 minutes of OT treatment with less than 4 rest breaks to increase activity tolerance for ADLs. 4. Patient will complete functional transfers with independence and adaptive equipment as needed. Timeframe: 7 visits     Comments:     OCCUPATIONAL THERAPY: Initial Assessment and AM 3/5/2018  INPATIENT: Hospital Day: 5  Payor: Rafaela Holley / Plan: [facility] HUMANA MEDICARE CHOICE PPO/PFFS / Product Type: [employer] Medicare /      NAME/AGE/GENDER: Kev Mcfarland is a 69 y.o. male   PRIMARY DIAGNOSIS:  COPD with acute exacerbation ([address]  Acute on chronic combined systolic and diastolic CHF (congestive heart failure) ([address]  COPD with acute exacerbation (HCC)  Acute on chronic combined systolic and diastolic CHF (congestive heart failure) (HCC)  Afib (HCC) Acute on chronic combined systolic and diastolic CHF (congestive heart failure) (HCC) Acute on chronic combined systolic and diastolic CHF (congestive heart failure) ([address]        ICD-10: Treatment Diagnosis:    · Generalized Muscle Weakness (M62.81)  · History of falling (Z91.81)   Precautions/Allergies:     Mylanta [aluminum-magnesium hydroxide]      ASSESSMENT:     Mr. Jacoby Figueroa presents to hospital for above. Pt lives alone in one-level home, there are 3 steps to enter, but pt also has a ramp. She is typically independent with ADLs/IADLs, including driving. Pt uses a cane PRN and has access to a rolling walker if needed. He admits to 3-4 falls in the last year. Today, pt is supine in bed upon arrival, AOX4, and agreeable to OT evaluation. Pt completes bed mobility with mod I and additional time, demonstrating intact sitting balance at EOB.  Pt completes STS with SBA and transfer from bed to chair with CGA for steadying and no AD. Pt began feeling \"woozy\" once seated in chair. RN was alerted and supplemental O2 donned as SaO2 was 87% with sats >90% with deep breathing. Pt was left seated in chair with chair alarm on and all needs met. He will benefit from continued skilled OT services to maximize safety and independence with ADLs as pt lives alone. Will follow. This section established at most recent assessment   PROBLEM LIST (Impairments causing functional limitations):  1. Decreased Strength  2. Decreased ADL/Functional Activities  3. Decreased Transfer Abilities  4. Decreased Ambulation Ability/Technique  5. Decreased Balance  6. Decreased Activity Tolerance  7. Increased Fatigue  8. Increased Shortness of Breath   INTERVENTIONS PLANNED: (Benefits and precautions of occupational therapy have been discussed with the patient.)  1. Activities of daily living training  2. Adaptive equipment training  3. Balance training  4. Group therapy  5. Therapeutic activity  6. Therapeutic exercise     TREATMENT PLAN: Frequency/Duration: Follow patient 3x/week to address above goals. Rehabilitation Potential For Stated Goals: Good     RECOMMENDED REHABILITATION/EQUIPMENT: (at time of discharge pending progress): Due to the probability of continued deficits (see above) this patient will not likely need continued skilled occupational therapy after discharge. Equipment:    None at this time              OCCUPATIONAL PROFILE AND HISTORY:   History of Present Injury/Illness (Reason for Referral):  See H&P  Past Medical History/Comorbidities:   Mr. Merary Briggs  has a past medical history of Asthma; CAD (coronary artery disease); Chronic subdural hematoma ([address] (3/1/2018); COPD; Diabetes ([address] Endocrine disease; Gastrointestinal disorder; Heart failure ([address] Hypertension; Other ill-defined conditions(799.89); and PUD (peptic ulcer disease).  He also has no past medical history of Arthritis; Autoimmune disease ([facility] Cancer ([facility] Chronic kidney disease; DEMENTIA; Infectious disease; Liver disease; Psychiatric disorder; Seizures ([facility] Sleep disorder; Stroke [facility]); or Thromboembolus ([facility] Mr. Catina Bell  has a past surgical history that includes pr cardiac surg procedure unlist.  Social History/Living Environment:   Home Environment: Private residence  # Steps to Enter: 0  Wheelchair Ramp: Yes  One/Two Story Residence: One story  Living Alone: Yes  Support Systems: Friends \ neighbors, Family member(s)  Patient Expects to be Discharged to[de-identified] Private residence  Current DME Used/Available at Home: [employer], Rj, [address] xaitment chair  Tub or Shower Type: Shower  Prior Level of Function/Work/Activity:  Independent to [employer] with ADLs  Personal Factors:          Sex:  male        Age:  69 y.o. Number of Personal Factors/Comorbidities that affect the Plan of Care: Expanded review of therapy/medical records (1-2):  MODERATE COMPLEXITY   ASSESSMENT OF OCCUPATIONAL PERFORMANCE[de-identified]   Activities of Daily Living:           Basic ADLs (From Assessment) Complex ADLs (From Assessment)   Basic ADL  Feeding: Independent  Oral Facial Hygiene/Grooming: Independent  Bathing: Contact guard assistance  Upper Body Dressing: Independent  Lower Body Dressing: Contact guard assistance  Toileting: Contact guard assistance     Grooming/Bathing/Dressing Activities of Daily Living     Cognitive Retraining  Safety/Judgement: Awareness of environment; Insight into deficits                       Bed/Mat Mobility  Supine to Sit: Additional time  Sit to Stand: Stand-by assistance  Bed to Chair: Contact guard assistance  Scooting: Independent       Most Recent Physical Functioning:   Gross Assessment:  AROM: Within functional limits  Strength: Generally decreased, functional  Coordination: Within functional limits  Sensation: Intact               Posture:  Posture (WDL): Exceptions to WDL  Posture Assessment:  Forward head, Rounded shoulders  Balance:  Sitting: Intact  Standing: Impaired  Standing - Static: Fair  Standing - Dynamic : Fair Bed Mobility:  Supine to Sit: Additional time  Scooting: Independent  Wheelchair Mobility:     Transfers:  Sit to Stand: Stand-by assistance  Stand to Sit: Stand-by assistance  Bed to Chair: Contact guard assistance                Patient Vitals for the past 6 hrs:   BP BP Patient Position SpO2 O2 Flow Rate (L/min) Pulse   18 0856 - - 93 % 0 l/min -   18 0911 (!) 88/52 Supine 92 % - (!) 116   18 1200 (!) 88/61 - 95 % 3 l/min -   18 1248 (!) 82/58 - 93 % - (!) 119       Mental Status  Neurologic State: Alert  Orientation Level: Oriented X4  Cognition: Follows commands  Perception: Appears intact  Perseveration: No perseveration noted  Safety/Judgement: Awareness of environment, Insight into deficits                          Physical Skills Involved:  1. Balance  2. Strength  3. Activity Tolerance Cognitive Skills Affected (resulting in the inability to perform in a timely and safe manner): 1. none Psychosocial Skills Affected:  1. Habits/Routines  2. Social Roles   Number of elements that affect the Plan of Care: 3-5:  MODERATE COMPLEXITY   CLINICAL DECISION MAKIN[address] [place] [id number] AM-PAC 6 Clicks   Daily Activity Inpatient Short Form  How much help from another person does the patient currently need. .. Total A Lot A Little None   1. Putting on and taking off regular lower body clothing? [] 1   [] 2   [x] 3   [] 4   2. Bathing (including washing, rinsing, drying)? [] 1   [] 2   [x] 3   [] 4   3. Toileting, which includes using toilet, bedpan or urinal?   [] 1   [] 2   [x] 3   [] 4   4. Putting on and taking off regular upper body clothing? [] 1   [] 2   [] 3   [x] 4   5. Taking care of personal grooming such as brushing teeth? [] 1   [] 2   [] 3   [x] 4   6. Eating meals?    [] 1   [] 2   [] 3   [x] 4   © , Trustees of [address] [place] [address] [address] [address], under license to [employer]. All rights reserved      Score:  Initial: 21 Most Recent: X (Date: -- )    Interpretation of Tool:  Represents activities that are increasingly more difficult (i.e. Bed mobility, Transfers, Gait). Score 24 23 22-20 19-15 14-10 9-7 6     Modifier CH CI CJ CK CL CM CN      ? Self Care:     - CURRENT STATUS: CJ - 20%-39% impaired, limited or restricted    - GOAL STATUS: CI - 1%-19% impaired, limited or restricted    - D/C STATUS:  ---------------To be determined---------------  Payor: HUMANA MEDICARE / Plan: [employer] MEDICARE CHOICE PPO/PFFS / Product Type: [facility] Care Medicare /      Medical Necessity:     · Patient is expected to demonstrate progress in strength and balance to increase independence with ADLs. Reason for Services/Other Comments:  · Patient continues to require skilled intervention due to medical complications. Use of outcome tool(s) and clinical judgement create a POC that gives a: LOW COMPLEXITY         TREATMENT:   (In addition to Assessment/Re-Assessment sessions the following treatments were rendered)     Pre-treatment Symptoms/Complaints:    Pain: Initial:   Pain Intensity 1: 0  Post Session:  same     Assessment/Reassessment only, no treatment provided today    Braces/Orthotics/Lines/Etc:   · O2 Device: Nasal cannula  Treatment/Session Assessment:    · Response to Treatment:  eval only   · Interdisciplinary Collaboration:   o Occupational Therapist  o Registered Nurse  · After treatment position/precautions:   o Up in chair  o Bed alarm/tab alert on  o Bed/Chair-wheels locked  o Call light within reach  o RN notified  o Family at bedside   · Compliance with Program/Exercises: compliant all of the time. · Recommendations/Intent for next treatment session: \"Next visit will focus on advancements to more challenging activities and reduction in assistance provided\".   Total Treatment Duration:  OT Patient Time In/Time Out  Time In: 1134  Time Out: 1158 [facility]

## 2018-03-05 NOTE — CONSULTS
[facility] Cardiology/Electrophyiology Consult                Date of  Admission: 3/1/2018 11:00 AM     CC/Reason for consult: atrial flutter    Yamile Dumas is a 69 y.o. male admitted for COPD with acute exacerbation ([facility] Acute on chronic combine*. 75yo M with a history of CAD s/p CABG complicated by ICM, chronic subdural hematoma with history of neurosurgery, debility, EF 10-15%, dilated LV and RV, COPD, HTN, HLD, CKD III, thrombocytopenia who was admitted with SOB, worsening edema, productive cough and presented with aflutter with RVR. Pt has had worsening SOB over the past few days to weeks, leg swelling, feeling lightheaded and dizzy and reports he almost passed out with exertion prior to presentation. Pt ICM is a chronic problem, worsening, aggravated by activity, no alleviating factors, with symptoms of SOB/OH, leg swelling. Pt aflutter is a recurrent problem, aggravated by severe cardiomyopathy, no alleviating factors, with symptoms of worsening CHF and presyncope. Pt denies palpitations or passing out. He has been on amiodarone at home.      Cardiac PMH: (Old records have been reviewed and summarized below)  Echo (3/1/18): EF 10-15%, severe hypokinesis, biventricular enlargement, biatrial enlargement, mod MR/TR    Patient Active Problem List   Diagnosis Code    Unstable angina ([facility]) I20.0    HTN (hypertension) I10    Dyslipidemia E78.5    Type 2 diabetes mellitus with stage 3 chronic kidney disease, without long-term current use of insulin ([facility]) E11.22, N18.3    COPD (chronic obstructive pulmonary disease) ([facility]) J44.9    CAD (coronary artery disease) I25.10    Combined systolic and diastolic congestive heart failure (HCC) I50.40    Frequent falls R29.6    Ischemic cardiomyopathy I25.5    CAP (community acquired pneumonia) J18.9    COPD with acute exacerbation ([facility] J44.1    Acute on chronic combined systolic and diastolic CHF (congestive heart failure) ([facility]) I50.43    Stage 3 chronic kidney disease N18.3    Acute respiratory failure with hypoxia ([facility]) J96.01    LFT elevation R79.89    Thrombocytopenia ([facility]) D69.6    Atrial flutter with rapid ventricular response ([facility]) I48.92       Past Medical History:   Diagnosis Date    Asthma     CAD (coronary artery disease)     Chronic subdural hematoma ([facility] 3/1/2018    COPD     Diabetes ([facility]     Endocrine disease     Gastrointestinal disorder     Heart failure ([facility]     Hypertension     Other ill-defined conditions(799.89)     elevated cholersterol    PUD (peptic ulcer disease)       Past Surgical History:   Procedure Laterality Date    CARDIAC SURG PROCEDURE UNLIST      bipass surgery     Allergies   Allergen Reactions    Mylanta [Aluminum-Magnesium Hydroxide] Rash      No family history on file.      Current Facility-Administered Medications   Medication Dose Route Frequency    predniSONE (DELTASONE) tablet 20 mg  20 mg Oral DAILY    azithromycin (ZITHROMAX) tablet 250 mg  250 mg Oral DAILY    metOLazone (ZAROXOLYN) tablet 5 mg  5 mg Oral DAILY    dilTIAZem CD (CARDIZEM CD) capsule 180 mg  180 mg Oral DAILY    furosemide (LASIX) injection 80 mg  80 mg IntraVENous BID    albuterol-ipratropium (DUO-NEB) 2.5 MG-0.5 MG/3 ML  3 mL Nebulization Q6H RT    budesonide (PULMICORT) 500 mcg/2 ml nebulizer suspension  500 mcg Nebulization BID RT    insulin lispro (HUMALOG) injection   SubCUTAneous AC&HS    ALPRAZolam (XANAX) tablet 0.25 mg  0.25 mg Oral BID PRN    aspirin chewable tablet 81 mg  81 mg Oral DAILY    carvedilol (COREG) tablet 25 mg  25 mg Oral BID WITH MEALS    levETIRAcetam (KEPPRA) tablet 750 mg  750 mg Oral BID    montelukast (SINGULAIR) tablet 10 mg  10 mg Oral DAILY    rosuvastatin (CRESTOR) tablet 20 mg  20 mg Oral QHS    tiotropium (SPIRIVA) inhalation capsule 18 mcg  1 Cap Inhalation DAILY    bisacodyl (DULCOLAX) tablet 5 mg  5 mg Oral DAILY PRN    guaiFENesin-dextromethorphan (ROBITUSSIN DM) 100-10 mg/5 mL syrup 10 mL  10 mL Oral Q4H PRN    hydrALAZINE (APRESOLINE) 20 mg/mL injection 20 mg  20 mg IntraVENous Q6H PRN    polyethylene glycol (MIRALAX) packet 17 g  17 g Oral DAILY PRN    simethicone (MYLICON) tablet 80 mg  80 mg Oral QID PRN    sodium chloride (NS) flush 5-10 mL  5-10 mL IntraVENous Q8H    sodium chloride (NS) flush 5-10 mL  5-10 mL IntraVENous PRN    acetaminophen (TYLENOL) tablet 650 mg  650 mg Oral Q4H PRN    HYDROcodone-acetaminophen (NORCO) 5-325 mg per tablet 1 Tab  1 Tab Oral Q4H PRN    naloxone (NARCAN) injection 0.4 mg  0.4 mg IntraVENous PRN    diphenhydrAMINE (BENADRYL) injection 25 mg  25 mg IntraVENous Q4H PRN    ondansetron (ZOFRAN) injection 4 mg  4 mg IntraVENous Q4H PRN    senna-docusate (PERICOLACE) 8.6-50 mg per tablet 2 Tab  2 Tab Oral DAILY PRN    LORazepam (ATIVAN) tablet 1 mg  1 mg Oral Q4H PRN    dextrose 40% (GLUTOSE) oral gel 1 Tube  15 g Oral PRN    glucagon (GLUCAGEN) injection 1 mg  1 mg IntraMUSCular PRN    dextrose (D50W) injection syrg 12.5-25 g  25-50 mL IntraVENous PRN    albuterol (PROVENTIL VENTOLIN) nebulizer solution 2.5 mg  2.5 mg Nebulization Q4H PRN       Review of Symptoms:  A comprehensive ROS was performed with the pertinent positives and negatives mentioned in the HPI, all other systems reviewed and are negative       Physical Exam  Vitals:    03/05/18 0856 03/05/18 0911 03/05/18 1200 03/05/18 1248   BP:  (!) 88/52 (!) 88/61 (!) 82/58   Pulse:  (!) 116  (!) 119   Resp:  19     Temp:  97.4 °F (36.3 °C)  97.6 °F (36.4 °C)   SpO2: 93% 92% 95% 93%   Weight:       Height:           Physical Exam:  General appearance - Alert, well appearing, and in no distress   Mental status - Affect appropriate to mood. Eyes - Sclerae anicteric  ENMT - Hearing grossly normal bilaterally, Dental hygiene good.   Neck - Carotids upstroke normal bilaterally, no bruits, elevated JVP  Resp - Clear to auscultation with decreased BS  Heart - irreg irreg, tachycardic, distant S1, S2, no murmurs,enlarged and displaced PMI  GI - Soft, nontender, nondistended  Neurological - Grossly intact - normal speech, no focal findings  Musculoskeletal - No joint tenderness, deformity or swelling, no muscular tenderness noted. Extremities - Peripheral pulses normal, no pedal edema, no clubbing or cyanosis. Skin - Normal coloration and turgor. Psych -  oriented to person, place, and time.        Cardiographics    Telemetry: significant artifact, tachycardia  ECG (Indpendently visualized and interpreted): SVT, mid RP tachycardia, atrial tach vs atypical atrial flutter  Echocardiogram:     Labs:   Recent Labs      03/05/18   0450  03/04/18   0416  03/03/18   0354   NA  139  141  139   K  3.6  4.3  3.7   MG  2.2  2.3  1.7*   BUN  23  26*  20   CREA  1.59*  1.58*  1.43   GLU  126*  141*  179*   WBC   --    --   5.6   HGB   --    --   11.0*   HCT   --    --   33.7*   PLT   --    --   142*        Assessment:      Principal Problem:    Acute on chronic combined systolic and diastolic CHF (congestive heart failure) (HCC) (3/1/2018)    Active Problems:    HTN (hypertension) (12/14/2014)      Type 2 diabetes mellitus with stage 3 chronic kidney disease, without long-term current use of insulin ([address] (7/1/2017)      COPD (chronic obstructive pulmonary disease) ([address] (7/1/2017)      CAD (coronary artery disease) (12/14/2014)      Combined systolic and diastolic congestive heart failure ([address] (7/1/2017)      Ischemic cardiomyopathy (7/1/2017)      COPD with acute exacerbation ([address] (3/1/2018)      Stage 3 chronic kidney disease (3/1/2018)      Acute respiratory failure with hypoxia ([address] (3/1/2018)      LFT elevation (3/1/2018)      Thrombocytopenia (HCC) (3/1/2018)      Atrial flutter with rapid ventricular response ([address] (3/1/2018)    75yo M with a history of CAD s/p CABG complicated by ICM, chronic subdural hematoma with history of neurosurgery, debility, EF 10-15%, dilated LV and RV, COPD, HTN, HLD, CKD III, thrombocytopenia who was admitted with SOB, worsening edema, productive cough and presented with aflutter with RVR. Plan:   1. Atrial flutter/Atrial tachycardia with RVR, uncontrolled: pt has been determined not to be a candidate for anticoagulation in the past 2/2 chronic SDH limiting his options. In the setting of worsening CHF, could consider BiV ICD vs BiV PPM and AV node ablation for definitive rate control. Discussed with patient today. He is going to think about option, he has refused cardiac device in the past but is more open to this option today. Cont coreg and diltiazem for rate control. 2. Severe ICM, worsening: cont OMT, discussed option of BiV ICD as noted above and Pt will consider, excellent diuresis over last 24 hours, cont to optimize volume status    3. CKD: increases alfie procedural risks    4. Thrombocytopenia: appears stable, plts > 100,000, cont to monitor    5. CAD: no active chest pain    Thank you very much for this referral. We appreciate the opportunity to participate in this patient's care. We will follow along with above stated plan. Sherie Miranda MD, MS  Cardiology/Electrophysiology

## 2018-03-05 NOTE — CONSULTS
Palliative Care    Patient: Tiffany Ledbetter MRN: [id number]  SSN: [id number]    YOB: 1940  Age: 69 y.o. Sex: male       Date of Request: 3/3/2018  Date of Consult:  3/5/2018  Reason for Consult:  CHF  Requesting Physician: Dr Yana Garcia     Assessment/Plan:     Principal Diagnosis:    Dyspnea  R06.00    Additional Diagnoses:   · Debility, Unspecified  R53.81  · Fatigue, Lethargy  R53.83  · Encounter for Palliative Care  Z51.5    Palliative Performance Scale (PPS):  PPS: 60    Medical Decision Making:   Reviewed and summarized notes from admission to present   Discussed case with appropriate providers  Reviewed laboratory and x-ray data from admission to present     Pt sleeping in bed, no distress noted. He will briefly open his eyes to voice and answer a few questions. He acknowledges some dyspnea but states overall it is better since admission. He denies pain. Could not have any sort of reasonable conversation about his CHF/goals/ACP due to lethargy. Will re-visit later in the afternoon, time permitting. EP to evaluate for possible ICD/AV note ablation. Will continue to follow. Will discuss findings with members of the interdisciplinary team.      Thank you for this referral.          .    Subjective:     History obtained from:  Patient and Chart    Chief Complaint: CHF  History of Present Illness:  Mr Donal Cummins is a 70 yo AA male with PMH of CAD, CHF, COPD, DM, HTN, and PUD, who presented to the ER from home on 3/1/2018 with c/o dyspnea, cough and LE edema for 3 days. Pt reported his dyspnea was worse with laying down, and rest improved his symptoms. Pt denied chest pain, fevers, n/v/d. Work up revealed BNP of 2400. Pt was admitted for further management. Cardiology evaluated pt, and he was placed on IV lasix. He initially had a good response, but urine output has declined. He has had intermittent atrial flutter since admission. He reports ongoing dyspnea.   EP to evaluate for possible BiV ICD and AV ablation. He his quite sleepy at present. Advance Directive: No       Code Status:  Full Code            Health Care Power of : No - Patient does not have a [address]. Past Medical History:   Diagnosis Date    Asthma     CAD (coronary artery disease)     Chronic subdural hematoma ([facility] 3/1/2018    COPD     Diabetes ([facility]     Endocrine disease     Gastrointestinal disorder     Heart failure ([facility]     Hypertension     Other ill-defined conditions(799.89)     elevated cholersterol    PUD (peptic ulcer disease)       Past Surgical History:   Procedure Laterality Date    CARDIAC SURG PROCEDURE UNLIST      bipass surgery     No family history on file. Social History   Substance Use Topics    Smoking status: Former Smoker     Packs/day: 1.50     Years: 40.00     Quit date: 1/1/1998    Smokeless tobacco: Never Used      Comment: quit 1998    Alcohol use No     Prior to Admission medications    Medication Sig Start Date End Date Taking? Authorizing Provider   levETIRAcetam (KEPPRA) 750 mg tablet Take 750 mg by mouth two (2) times a day. Yes Hamida Ko MD   potassium chloride SR (KLOR-CON 10) 10 mEq tablet Take 20 mEq by mouth two (2) times a day. Yes Historical Provider   levothyroxine (SYNTHROID) 75 mcg tablet Take 75 mcg by mouth Daily (before breakfast). Yes Historical Provider   tiotropium (SPIRIVA WITH HANDIHALER) 18 mcg inhalation capsule Take 1 Cap by inhalation daily. 8/16/17  Yes Charlie Chairez NP   rosuvastatin (CRESTOR) 20 mg tablet Take 20 mg by mouth nightly. Yes Historical Provider   carvedilol (COREG) 25 mg tablet Take 1 tablet by mouth two (2) times daily (with meals). 12/16/14  Yes Bailey Rogers PA-C   losartan (COZAAR) 50 mg tablet Take 1 tablet by mouth daily. 12/16/14  Yes Bailey Rogers PA-C   montelukast (SINGULAIR) 10 mg tablet Take 10 mg by mouth daily.    Yes Hamida Ko MD   albuterol (VENTOLIN HFA) 90 mcg/actuation inhaler Take  by inhalation as needed for Wheezing. Yes Hamida Ko MD   furosemide (LASIX) 40 mg tablet Take 40 mg by mouth two (2) times a day. 20 mg in am, 40 mg hs  Indications: takes 1/2 pill on saturday and sunday, then 40 mg rest of the week. Yes Hamida Ko MD   glimepiride (AMARYL) 2 mg tablet Take 2 mg by mouth every morning. Yes Hamida Ko MD   aspirin 81 mg chewable tablet Take 1 tablet by mouth daily. 12/16/14   Bailey Rogers PA-C   nitroglycerin (NITROSTAT) 0.4 mg SL tablet by SubLINGual route every five (5) minutes as needed for Chest Pain. Hamida Ko MD   ALPRAZolam Lark Hoar) 0.25 mg tablet Take 0.25 mg by mouth two (2) times daily as needed for Anxiety. Historical Provider       Allergies   Allergen Reactions    Mylanta [Aluminum-Magnesium Hydroxide] Rash        Review of Systems:  A comprehensive review of systems was negative except for:   Constitutional: Positive for fatigue. Respiratory: Positive for dyspnea      Objective:     Visit Vitals    BP (!) 88/52 (BP Patient Position: Supine)    Pulse (!) 116    Temp 97.4 °F (36.3 °C)    Resp 19    Ht 6' 3\" (1.905 m)    Wt 88.7 kg (195 lb 9.6 oz)    SpO2 (!) 84%    BMI 24.45 kg/m2        Physical Exam:    General:  Sleepy. No acute distress. Eyes:  Conjunctivae/corneas clear    Nose: Nares normal. Septum midline.  O2 via NC    Neck: Supple, symmetrical, trachea midline   Lungs:   Clear to auscultation bilaterally, unlabored   Heart:  Regular rate and rhythm   Abdomen:   Soft, non-tender, non-distended   Extremities: Normal, atraumatic, no cyanosis or edema   Skin: Skin color, texture, turgor normal.   Neurologic: Nonfocal   Psych: oriented      Assessment:     Hospital Problems  Never Reviewed          Codes Class Noted POA    COPD with acute exacerbation ([facility] ICD-10-CM: J44.1  ICD-9-CM: 491.21  3/1/2018 Yes        * (Principal)Acute on chronic combined systolic and diastolic CHF (congestive heart failure) (HCC) ICD-10-CM: I50.43  ICD-9-CM: 428.43, 428.0  3/1/2018 Yes        Stage 3 chronic kidney disease (Chronic) ICD-10-CM: N18.3  ICD-9-CM: 585.3  3/1/2018 Yes        Acute respiratory failure with hypoxia (HCC) ICD-10-CM: J96.01  ICD-9-CM: 518.81  3/1/2018 Yes        LFT elevation ICD-10-CM: R79.89  ICD-9-CM: 790.6  3/1/2018 Yes        Thrombocytopenia ([address] ICD-10-CM: D69.6  ICD-9-CM: 287.5  3/1/2018 Yes        Atrial flutter with rapid ventricular response (HCC) ICD-10-CM: N26.74  ICD-9-CM: 427.32  3/1/2018 Yes        Type 2 diabetes mellitus with stage 3 chronic kidney disease, without long-term current use of insulin (HCC) (Chronic) ICD-10-CM: E11.22, N18.3  ICD-9-CM: 250.40, 585.3  7/1/2017 Yes        COPD (chronic obstructive pulmonary disease) (HCC) (Chronic) ICD-10-CM: J44.9  ICD-9-CM: 433  7/1/2017 Yes        Combined systolic and diastolic congestive heart failure (HCC) (Chronic) ICD-10-CM: I50.40  ICD-9-CM: 428.40  7/1/2017 Yes        Ischemic cardiomyopathy (Chronic) ICD-10-CM: I25.5  ICD-9-CM: 414.8  7/1/2017 Yes        HTN (hypertension) (Chronic) ICD-10-CM: I10  ICD-9-CM: 401.9  12/14/2014 Yes        CAD (coronary artery disease) (Chronic) ICD-10-CM: I25.10  ICD-9-CM: 414.00  12/14/2014 Yes              Signed By: Susan Tinajero NP     March 5, 2018

## 2018-03-05 NOTE — PROGRESS NOTES
Problem: Falls - Risk of  Goal: *Absence of Falls  Document Familia Fall Risk and appropriate interventions in the flowsheet.    Outcome: Progressing Towards Goal  Fall Risk Interventions:  Mobility Interventions: Bed/chair exit alarm, Patient to call before getting OOB         Medication Interventions: Bed/chair exit alarm, Patient to call before getting OOB, Teach patient to arise slowly    Elimination Interventions: Bed/chair exit alarm    History of Falls Interventions: Bed/chair exit alarm, Door open when patient unattended, Room close to nurse's station

## 2018-03-05 NOTE — PROGRESS NOTES
Problem: Falls - Risk of  Goal: *Absence of Falls  Document Familia Fall Risk and appropriate interventions in the flowsheet.    Outcome: Progressing Towards Goal  Fall Risk Interventions:  Mobility Interventions: Patient to call before getting OOB         Medication Interventions: Patient to call before getting OOB, Teach patient to arise slowly    Elimination Interventions: Call light in reach, Patient to call for help with toileting needs    History of Falls Interventions: Bed/chair exit alarm

## 2018-03-05 NOTE — PROGRESS NOTES
Bedside and Verbal shift change report given to Christopher Montague RN (oncoming nurse) by Self (offgoing nurse). Report included the following information Kardex, Intake/Output, MAR and Cardiac Rhythm a-flutter.

## 2018-03-05 NOTE — PROGRESS NOTES
[facility] CARDIOLOGY PROGRESS NOTE           3/5/2018 8:04 AM    Admit Date: 3/1/2018      Subjective:   Patient lying flat. Remains dyspneic. Now with rates 120bpm    ROS:  Cardiovascular:  As noted above    Objective:      Vitals:    03/04/18 2149 03/05/18 0031 03/05/18 0137 03/05/18 0440   BP: 114/69 92/70 98/69 97/62   Pulse: (!) 102 (!) 102 90 100   Resp: 19 18 19 18   Temp: 97.7 °F (36.5 °C) 97 °F (36.1 °C) 97.2 °F (36.2 °C) 97.2 °F (36.2 °C)   SpO2: 95% 93% 94% 92%   Weight:    88.7 kg (195 lb 9.6 oz)   Height:           Physical Exam:  General-No Acute Distress  Neck- supple, no JVD  CV- tachycardia and regular rhythm no MRG  Lung- clear bilaterally  Abd- soft, nontender, nondistended  Ext- no edema bilaterally. Skin- warm and dry    Data Review:   Recent Labs      03/05/18   0450  03/04/18   0416  03/03/18   0354   NA  139  141  139   K  3.6  4.3  3.7   MG  2.2  2.3  1.7*   BUN  23  26*  20   CREA  1.59*  1.58*  1.43   GLU  126*  141*  179*   WBC   --    --   5.6   HGB   --    --   11.0*   HCT   --    --   33.7*   PLT   --    --   142*       Assessment/Plan:     Principal Problem:    Acute on chronic combined systolic and diastolic CHF (congestive heart failure) ([facility]) (3/1/2018)    EF 10%. Volume improved with zaroxolyn. 4+ liters later 24hrs. On carvedilol and cardizem due to tachycardia.   No ACE/ARB due to concerns for MITA    Active Problems:    HTN (hypertension) (12/14/2014)    BP now low and monitor with diuresis      Type 2 diabetes mellitus with stage 3 chronic kidney disease, without long-term current use of insulin ([facility] (7/1/2017)    Medical therapy appropriate       COPD (chronic obstructive pulmonary disease) ([facility] (7/1/2017)    Stable       CAD (coronary artery disease) (12/14/2014)    No angina      Combined systolic and diastolic congestive heart failure ([facility] (7/1/2017)    As above       Atrial flutter with rapid ventricular response ([facility] (3/1/2018)    Repeat ECG suspect remains in atrial flutter with RVR. Not sure he will tolerate eCV or ablation. Will have EP see to assess for BiV ICD and AV node ablation. Will need to address anticoagulation.             Dioni Resendiz MD  3/5/2018 8:04 AM

## 2018-03-05 NOTE — PROGRESS NOTES
Hospitalist Progress Note    Subjective:   Daily Progress Note: 3/5/2018 4:31 PM    Patient presented to ER 3/1/18 with complaints of productive cough of thick white sputum x 2 weeks, orthopnea, , wheezing, progressively worsening shortness of breath, and bilateral lower extremity edema that began am of presentation.  Denied recent or current illness or exposure, no fever.  Oxygen and duoneb en route per EMS with some relief.  Known COPD and asthma, CAD, CHF.  Heart rate is 123 on admission to ER. Embden Cambria and basilar rales noted.  +3 pitting edema to bilateral lower extremities, left >right.  Negative troponin. H/H: 12.1/36.5, platelets: 378, glucose: 173, creatinine: 1.63 with gfr: 44.  BNP: 2400, baseline 900.  No chest pain  Found in atrial flutter with RVR. Last EKG 9/2017 in [facility].  Admitted to tele with IV lasix, daily weights, mayorga, strict I+O, nebs, SSI.  Refused AICD in past.  2016 Echocardiogram 25-30% EF, holding home meds.  IV diltiazem begun.  Takes amiodarone at home. He was recently admitted with unstable angina.  Seen by Cardiology.  A1C: 8.1      Today, mild residual SOB and occasional cough, feels better    Current Facility-Administered Medications   Medication Dose Route Frequency    predniSONE (DELTASONE) tablet 20 mg  20 mg Oral DAILY    azithromycin (ZITHROMAX) tablet 250 mg  250 mg Oral DAILY    metOLazone (ZAROXOLYN) tablet 5 mg  5 mg Oral DAILY    dilTIAZem CD (CARDIZEM CD) capsule 180 mg  180 mg Oral DAILY    furosemide (LASIX) injection 80 mg  80 mg IntraVENous BID    albuterol-ipratropium (DUO-NEB) 2.5 MG-0.5 MG/3 ML  3 mL Nebulization Q6H RT    budesonide (PULMICORT) 500 mcg/2 ml nebulizer suspension  500 mcg Nebulization BID RT    insulin lispro (HUMALOG) injection   SubCUTAneous AC&HS    ALPRAZolam (XANAX) tablet 0.25 mg  0.25 mg Oral BID PRN    aspirin chewable tablet 81 mg  81 mg Oral DAILY    carvedilol (COREG) tablet 25 mg  25 mg Oral BID WITH MEALS    levETIRAcetam (KEPPRA) tablet 750 mg  750 mg Oral BID    montelukast (SINGULAIR) tablet 10 mg  10 mg Oral DAILY    rosuvastatin (CRESTOR) tablet 20 mg  20 mg Oral QHS    tiotropium (SPIRIVA) inhalation capsule 18 mcg  1 Cap Inhalation DAILY    bisacodyl (DULCOLAX) tablet 5 mg  5 mg Oral DAILY PRN    guaiFENesin-dextromethorphan (ROBITUSSIN DM) 100-10 mg/5 mL syrup 10 mL  10 mL Oral Q4H PRN    hydrALAZINE (APRESOLINE) 20 mg/mL injection 20 mg  20 mg IntraVENous Q6H PRN    polyethylene glycol (MIRALAX) packet 17 g  17 g Oral DAILY PRN    simethicone (MYLICON) tablet 80 mg  80 mg Oral QID PRN    sodium chloride (NS) flush 5-10 mL  5-10 mL IntraVENous Q8H    sodium chloride (NS) flush 5-10 mL  5-10 mL IntraVENous PRN    acetaminophen (TYLENOL) tablet 650 mg  650 mg Oral Q4H PRN    HYDROcodone-acetaminophen (NORCO) 5-325 mg per tablet 1 Tab  1 Tab Oral Q4H PRN    naloxone (NARCAN) injection 0.4 mg  0.4 mg IntraVENous PRN    diphenhydrAMINE (BENADRYL) injection 25 mg  25 mg IntraVENous Q4H PRN    ondansetron (ZOFRAN) injection 4 mg  4 mg IntraVENous Q4H PRN    senna-docusate (PERICOLACE) 8.6-50 mg per tablet 2 Tab  2 Tab Oral DAILY PRN    LORazepam (ATIVAN) tablet 1 mg  1 mg Oral Q4H PRN    dextrose 40% (GLUTOSE) oral gel 1 Tube  15 g Oral PRN    glucagon (GLUCAGEN) injection 1 mg  1 mg IntraMUSCular PRN    dextrose (D50W) injection syrg 12.5-25 g  25-50 mL IntraVENous PRN    albuterol (PROVENTIL VENTOLIN) nebulizer solution 2.5 mg  2.5 mg Nebulization Q4H PRN      Review of Systems  A comprehensive review of systems was negative except for that written in the HPI.     Objective:     Visit Vitals    BP (!) 88/52 (BP Patient Position: Supine)    Pulse (!) 116    Temp 97.4 °F (36.3 °C)    Resp 19    Ht 6' 3\" (1.905 m)    Wt 88.7 kg (195 lb 9.6 oz)    SpO2 92%    BMI 24.45 kg/m2    O2 Flow Rate (L/min): 0 l/min O2 Device: Room air    Temp (24hrs), Av.4 °F (36.3 °C), Min:97 °F (36.1 °C), Max:97.9 °F (36.6 °C)    03/05 0701 - 03/05 1900  In: -   Out: 18 [Urine:570]  03/03 1901 - 03/05 0700  In: 425 [P.O.:425]  Out: 5350 [Urine:5350]    General appearance:  Oriented, but dozing most of conversation with sisters. Weak and frail. Cooperative, reports is feeling better,   Lungs: mild exp wheez bilateral  Heart: Irregular rate and rhythm, S1, S2 normal, no murmur, click, rub or gallop  Abdomen: soft, non-tender.  Bowel sounds normal. No masses,  no organomegaly  Extremities: extremities normal, atraumatic, no cyanosis or edema  Skin: Skin color, texture, turgor normal. No rashes or lesions  Neurologic: Grossly normal      Additional comments:  Orders, notes, test results, vitals reviewed    Data Review  Recent Results (from the past 24 hour(s))   GLUCOSE, POC    Collection Time: 03/04/18 11:58 AM   Result Value Ref Range    Glucose (POC) 199 (H) 65 - 100 mg/dL   PLEASE READ & DOCUMENT PPD TEST IN 24 HRS    Collection Time: 03/04/18  2:26 PM   Result Value Ref Range    PPD negative Negative    mm Induration 0 mm   GLUCOSE, POC    Collection Time: 03/04/18  4:35 PM   Result Value Ref Range    Glucose (POC) 153 (H) 65 - 100 mg/dL   GLUCOSE, POC    Collection Time: 03/04/18  9:48 PM   Result Value Ref Range    Glucose (POC) 263 (H) 65 - 855 mg/dL   METABOLIC PANEL, BASIC    Collection Time: 03/05/18  4:50 AM   Result Value Ref Range    Sodium 139 136 - 145 mmol/L    Potassium 3.6 3.5 - 5.1 mmol/L    Chloride 99 98 - 107 mmol/L    CO2 28 21 - 32 mmol/L    Anion gap 12 7 - 16 mmol/L    Glucose 126 (H) 65 - 100 mg/dL    BUN 23 8 - 23 MG/DL    Creatinine 1.59 (H) 0.8 - 1.5 MG/DL    GFR est AA 55 (L) >60 ml/min/1.73m2    GFR est non-AA 45 (L) >60 ml/min/1.73m2    Calcium 9.4 8.3 - 10.4 MG/DL   MAGNESIUM    Collection Time: 03/05/18  4:50 AM   Result Value Ref Range    Magnesium 2.2 1.8 - 2.4 mg/dL   BNP    Collection Time: 03/05/18  4:50 AM   Result Value Ref Range    BNP 1215 pg/mL   HEPATIC FUNCTION PANEL    Collection Time: 03/05/18  4:50 AM   Result Value Ref Range    Protein, total 7.6 6.3 - 8.2 g/dL    Albumin 3.9 3.2 - 4.6 g/dL    Globulin 3.7 (H) 2.3 - 3.5 g/dL    A-G Ratio 1.1 (L) 1.2 - 3.5      Bilirubin, total 1.8 (H) 0.2 - 1.1 MG/DL    Bilirubin, direct 0.6 (H) <0.4 MG/DL    Alk. phosphatase 145 (H) 50 - 136 U/L    AST (SGOT) 40 (H) 15 - 37 U/L    ALT (SGPT) 87 (H) 12 - 65 U/L   GLUCOSE, POC    Collection Time: 03/05/18  6:03 AM   Result Value Ref Range    Glucose (POC) 130 (H) 65 - 100 mg/dL   EKG, 12 LEAD, INITIAL    Collection Time: 03/05/18  8:17 AM   Result Value Ref Range    Ventricular Rate 119 BPM    Atrial Rate 119 BPM    P-R Interval 136 ms    QRS Duration 112 ms    Q-T Interval 362 ms    QTC Calculation (Bezet) 509 ms    Calculated P Axis 37 degrees    Calculated R Axis -63 degrees    Calculated T Axis 100 degrees    Diagnosis       Sinus tachycardia  Left axis deviation  Minimal voltage criteria for LVH, may be normal variant  Anterior infarct , age undetermined  Abnormal ECG  When compared with ECG of 02-MAR-2018 09:45,  Premature ventricular complexes are no longer Present  FL interval has decreased  T wave inversion no longer evident in Inferior leads     GLUCOSE, POC    Collection Time: 03/05/18 10:47 AM   Result Value Ref Range    Glucose (POC) 256 (H) 65 - 100 mg/dL       CXR: 3/1/18:  Postoperative changes from CABG with enlarged cardiac silhouette. Left basal scarring is stable. No acute pulmonary edema or infiltrate. No pleural effusion or pneumothorax. IMPRESSION: Stable chest x-ray with chronic findings. No acute abnormality.     3/1/18:  ABDOMINAL ULTRASOUND:  The liver, pancreas, spleen, and both kidneys are of normal size and echogenicity. The abdominal aorta, IVC, portal venous doppler studies are unremarkable. Impression: Bilateral renal cysts     ECHOCARDIOGRAM:  -  Left ventricle: The ventricle was moderately dilated. The ventricle was severely dilated. Systolic function was severely reduced. Ejection fraction  Was estimated in the range of 10 % to 15 %. SVi= 17.8. There was severe diffuse hypokinesis with regional variations. -  Ventricular septum: There was paradoxical motion. -  Right ventricle: The ventricle was dilated. Systolic function was markedly reduced.     Assessment/Plan:     1- Ac on ch systolic CHF, ef 10, evaluation for possible ICD planned by card, to follow  2- COPD exacerb, apparently still wheezing, added low dose of steroid and azithro  3- HypoK and hypoMg, replaced as needed  4- Ac hypoxic resp failure dt #1-2, resolved    Dispo: home wjen w/u is finished w/ cardiology    Signed By: Lamonte Manning MD     March 5, 2018

## 2018-03-05 NOTE — PROGRESS NOTES
Met with patient for follow up and d/c plan. Patient lives alone has had recent falls. Discussed rehab prior to going home and patient states he wants to go home but will think about rehab. Provided patient with a list of SNF's. He is currently on O2 and does not have at home. CM following for d/c.

## 2018-03-05 NOTE — PROGRESS NOTES
Monitor room called stated patient had an 8 beat run of Vtac. UZMA Lutz notified. Patient asymptomatic, no new orders at this time. Potassium 3.6 and magnesium 2.2. Will continue to monitor.

## 2018-03-05 NOTE — PROGRESS NOTES
Bedside and Verbal shift change report given to self (oncoming nurse) by Jane Levy RN (offgoing nurse). Report included the following information SBAR, Kardex, Intake/Output, MAR and Recent Results.

## 2018-03-06 NOTE — PROGRESS NOTES
Bedside and Verbal shift change report given to self (oncoming nurse) by Lloyd Banks RN (offgoing nurse). Report included the following information SBAR, Kardex, Intake/Output, MAR and Recent Results.

## 2018-03-06 NOTE — PROGRESS NOTES
Palliative Care Progress Note    Patient: Hal Gannon MRN: [id number]  SSN: [id number]    YOB: 1940  Age: 69 y.o. Sex: male       Assessment/Plan:     Chief Complaint/Interval History: 3/6/2018     Principal Diagnosis:    · Debility, Unspecified  R53.81    Additional Diagnoses:   · Altered Mental Status R41.82  · Cachexia  R64  · Frailty  R54  · Encounter for Palliative Care  Z51.5    Palliative Performance Scale (PPS)  PPS: 60    Medical Decision Making:   Reviewed and summarized - Notes reviewed. Patient is still minimally responsive. Reviewed laboratory and x-ray data. Sister-in-law at bedside and stated the daughter lives in [place]. The sister in law suggested that the daughter might be working to make arrangements to come to [place]. Stated that she and the daughter are aware of the severity of his illnesses. His brother and sister have passed away. He is the only remaining sibling. She stated that is is just sleeping most of the time. Conversations related to end of life wishes would be appropriate. We may need to discuss with the daughter since the patient has been having difficulty making decisions. He has been on amiodarone at home. More than 50% of this 35 minute visit was spent counseling and coordination of care as outlined above. Subjective:     Review of Systems:  Review of systems not obtained due to patient factors. AMS     Objective:     Visit Vitals    BP 95/60 (BP 1 Location: Left arm, BP Patient Position: Sitting)    Pulse 96    Temp 97.5 °F (36.4 °C)    Resp 18    Ht 6' 3\" (1.905 m)    Wt 79.6 kg (175 lb 6.4 oz)    SpO2 96%    BMI 21.92 kg/m2       Physical Exam:    General:  Sleeping. Sister in law stated he is sleeping quite a bit. Very frail. Eyes:  Conjunctivae/corneas clear    Nose: Nares normal. Septum midline.    Neck: Supple, symmetrical, trachea midline, no JVD   Lungs:   Clear to auscultation bilaterally, unlabored   Heart:  Regular rate and rhythm, no murmur    Abdomen:   Soft, non-tender, non-distended   Extremities: Normal, atraumatic, no cyanosis or edema   Skin: Skin color, texture, turgor normal. No rash or lesions. Neurologic: Nonfocal   Psych: Opened eyes.      Signed By: Anya Alatorre NP     March 6, 2018

## 2018-03-06 NOTE — PROGRESS NOTES
Problem: Falls - Risk of  Goal: *Absence of Falls  Document Familia Fall Risk and appropriate interventions in the flowsheet. Outcome: Progressing Towards Goal  Fall Risk Interventions:  Mobility Interventions: Patient to call before getting OOB         Medication Interventions: Patient to call before getting OOB, Teach patient to arise slowly    Elimination Interventions: Call light in reach    History of Falls Interventions: Door open when patient unattended, Room close to nurse's station        Problem: Chronic Obstructive Pulmonary Disease (COPD)  Goal: *Oxygen saturation during activity within specified parameters  Outcome: Not Progressing Towards Goal  Patient needing 2 L oxygen to maintain saturation above 90%.

## 2018-03-06 NOTE — PROGRESS NOTES
Hospitalist Progress Note    Subjective:   Daily Progress Note: 3/6/2018 4:31 PM    Patient presented to ER 3/1/18 with complaints of productive cough of thick white sputum x 2 weeks, orthopnea, , wheezing, progressively worsening shortness of breath, and bilateral lower extremity edema that began am of presentation.  Denied recent or current illness or exposure, no fever.  Oxygen and duoneb en route per EMS with some relief.  Known COPD and asthma, CAD, CHF.  Heart rate is 123 on admission to ER. Marlyse Shady and basilar rales noted.  +3 pitting edema to bilateral lower extremities, left >right.  Negative troponin. H/H: 12.1/36.5, platelets: 839, glucose: 173, creatinine: 1.63 with gfr: 44.  BNP: 2400, baseline 900.  No chest pain  Found in atrial flutter with RVR. Last EKG 9/2017 in [facility].  Admitted to tele with IV lasix, daily weights, mayorga, strict I+O, nebs, SSI.  Refused AICD in past.  2016 Echocardiogram 25-30% EF, holding home meds.  IV diltiazem begun.  Takes amiodarone at home. He was recently admitted with unstable angina.  Seen by Cardiology.  A1C: 8.1      Today, sleepy, very mild SOB and occasional cough, feels better    Current Facility-Administered Medications   Medication Dose Route Frequency    metoprolol succinate (TOPROL-XL) XL tablet 50 mg  50 mg Oral BID    predniSONE (DELTASONE) tablet 20 mg  20 mg Oral DAILY    azithromycin (ZITHROMAX) tablet 250 mg  250 mg Oral DAILY    dilTIAZem CD (CARDIZEM CD) capsule 120 mg  120 mg Oral DAILY    albuterol-ipratropium (DUO-NEB) 2.5 MG-0.5 MG/3 ML  3 mL Nebulization Q6H RT    budesonide (PULMICORT) 500 mcg/2 ml nebulizer suspension  500 mcg Nebulization BID RT    insulin lispro (HUMALOG) injection   SubCUTAneous AC&HS    ALPRAZolam (XANAX) tablet 0.25 mg  0.25 mg Oral BID PRN    aspirin chewable tablet 81 mg  81 mg Oral DAILY    levETIRAcetam (KEPPRA) tablet 750 mg  750 mg Oral BID    montelukast (SINGULAIR) tablet 10 mg  10 mg Oral DAILY    rosuvastatin (CRESTOR) tablet 20 mg  20 mg Oral QHS    tiotropium (SPIRIVA) inhalation capsule 18 mcg  1 Cap Inhalation DAILY    bisacodyl (DULCOLAX) tablet 5 mg  5 mg Oral DAILY PRN    guaiFENesin-dextromethorphan (ROBITUSSIN DM) 100-10 mg/5 mL syrup 10 mL  10 mL Oral Q4H PRN    hydrALAZINE (APRESOLINE) 20 mg/mL injection 20 mg  20 mg IntraVENous Q6H PRN    polyethylene glycol (MIRALAX) packet 17 g  17 g Oral DAILY PRN    simethicone (MYLICON) tablet 80 mg  80 mg Oral QID PRN    sodium chloride (NS) flush 5-10 mL  5-10 mL IntraVENous Q8H    sodium chloride (NS) flush 5-10 mL  5-10 mL IntraVENous PRN    acetaminophen (TYLENOL) tablet 650 mg  650 mg Oral Q4H PRN    HYDROcodone-acetaminophen (NORCO) 5-325 mg per tablet 1 Tab  1 Tab Oral Q4H PRN    naloxone (NARCAN) injection 0.4 mg  0.4 mg IntraVENous PRN    diphenhydrAMINE (BENADRYL) injection 25 mg  25 mg IntraVENous Q4H PRN    ondansetron (ZOFRAN) injection 4 mg  4 mg IntraVENous Q4H PRN    senna-docusate (PERICOLACE) 8.6-50 mg per tablet 2 Tab  2 Tab Oral DAILY PRN    LORazepam (ATIVAN) tablet 1 mg  1 mg Oral Q4H PRN    dextrose 40% (GLUTOSE) oral gel 1 Tube  15 g Oral PRN    glucagon (GLUCAGEN) injection 1 mg  1 mg IntraMUSCular PRN    dextrose (D50W) injection syrg 12.5-25 g  25-50 mL IntraVENous PRN    albuterol (PROVENTIL VENTOLIN) nebulizer solution 2.5 mg  2.5 mg Nebulization Q4H PRN          Objective:     Visit Vitals    BP 97/58 (BP 1 Location: Left arm, BP Patient Position: At rest)    Pulse 95    Temp 97.6 °F (36.4 °C)    Resp 18    Ht 6' 3\" (1.905 m)    Wt 79.6 kg (175 lb 6.4 oz)    SpO2 97%    BMI 21.92 kg/m2    O2 Flow Rate (L/min): 2 l/min O2 Device: Nasal cannula    Temp (24hrs), Av.6 °F (36.4 °C), Min:97.4 °F (36.3 °C), Max:97.8 °F (36.6 °C)    701 - 1900  In: -   Out: 525 [Urine:525]  1901 - 700  In: 100 [P.O.:100]  Out: 5095 [Urine:5095]    General appearance:  Oriented, but dozing most of conversation with sisters. Weak and frail. Cooperative, reports is feeling better,   Lungs: mild exp wheez bilateral (less today)  Heart: Irregular rate and rhythm, S1, S2 normal, no murmur, click, rub or gallop  Abdomen: soft, non-tender.  Bowel sounds normal. No masses,  no organomegaly  Extremities: extremities normal, atraumatic, no cyanosis or edema  Skin: Skin color, texture, turgor normal. No rashes or lesions  Neurologic: Grossly normal      Additional comments:  Orders, notes, test results, vitals reviewed    Data Review  Recent Results (from the past 24 hour(s))   PLEASE READ & DOCUMENT PPD TEST IN 48 HRS    Collection Time: 03/05/18  2:30 PM   Result Value Ref Range    PPD Negative Negative    mm Induration 0 mm   GLUCOSE, POC    Collection Time: 03/05/18  3:34 PM   Result Value Ref Range    Glucose (POC) 182 (H) 65 - 100 mg/dL   GLUCOSE, POC    Collection Time: 03/05/18  9:29 PM   Result Value Ref Range    Glucose (POC) 251 (H) 65 - 438 mg/dL   METABOLIC PANEL, BASIC    Collection Time: 03/06/18  3:53 AM   Result Value Ref Range    Sodium 134 (L) 136 - 145 mmol/L    Potassium 4.1 3.5 - 5.1 mmol/L    Chloride 91 (L) 98 - 107 mmol/L    CO2 32 21 - 32 mmol/L    Anion gap 11 7 - 16 mmol/L    Glucose 341 (H) 65 - 100 mg/dL    BUN 42 (H) 8 - 23 MG/DL    Creatinine 2.44 (H) 0.8 - 1.5 MG/DL    GFR est AA 33 (L) >60 ml/min/1.73m2    GFR est non-AA 28 (L) >60 ml/min/1.73m2    Calcium 9.4 8.3 - 10.4 MG/DL   MAGNESIUM    Collection Time: 03/06/18  3:53 AM   Result Value Ref Range    Magnesium 2.2 1.8 - 2.4 mg/dL   CBC W/O DIFF    Collection Time: 03/06/18  3:53 AM   Result Value Ref Range    WBC 8.6 4.3 - 11.1 K/uL    RBC 5.20 4.23 - 5.67 M/uL    HGB 12.7 (L) 13.6 - 17.2 g/dL    HCT 38.2 (L) 41.1 - 50.3 %    MCV 73.5 (L) 79.6 - 97.8 FL    MCH 24.4 (L) 26.1 - 32.9 PG    MCHC 33.2 31.4 - 35.0 g/dL    RDW 15.2 (H) 11.9 - 14.6 %    PLATELET 204 181 - 245 K/uL    MPV Cannot be calculated 10.8 - 14.1 FL   GLUCOSE, POC Collection Time: 03/06/18  6:36 AM   Result Value Ref Range    Glucose (POC) 236 (H) 65 - 100 mg/dL       CXR: 3/1/18:  Postoperative changes from CABG with enlarged cardiac silhouette. Left basal scarring is stable. No acute pulmonary edema or infiltrate. No pleural effusion or pneumothorax. IMPRESSION: Stable chest x-ray with chronic findings. No acute abnormality.     3/1/18:  ABDOMINAL ULTRASOUND:  The liver, pancreas, spleen, and both kidneys are of normal size and echogenicity. The abdominal aorta, IVC, portal venous doppler studies are unremarkable. Impression: Bilateral renal cysts     ECHOCARDIOGRAM:  -  Left ventricle: The ventricle was moderately dilated. The ventricle was severely dilated. Systolic function was severely reduced. Ejection fraction  Was estimated in the range of 10 % to 15 %. SVi= 17.8. There was severe diffuse hypokinesis with regional variations. -  Ventricular septum: There was paradoxical motion. -  Right ventricle: The ventricle was dilated. Systolic function was markedly reduced. Assessment/Plan:     1- Ac on ch systolic CHF, ef 10, evaluation for possible ICD planned by card, to follow. Improved   2- COPD exacerb, apparently still wheezing, added low dose of steroid and azithro.  Improving   3- HypoK and hypoMg, replaced as needed  4- Ac hypoxic resp failure dt #1-2, resolved    Dispo: home when w/u is finished w/ cardiology    Signed By: Rain Lopez MD     March 6, 2018

## 2018-03-06 NOTE — PROGRESS NOTES
Dr. Myrna Fraire paged aboutpatient having blood sugar of 371, will give 10 units of coverage per order set.

## 2018-03-06 NOTE — PROGRESS NOTES
Problem: Falls - Risk of  Goal: *Absence of Falls  Document Familia Fall Risk and appropriate interventions in the flowsheet.    Outcome: Progressing Towards Goal  Fall Risk Interventions:  Mobility Interventions: Patient to call before getting OOB         Medication Interventions: Bed/chair exit alarm, Patient to call before getting OOB, Teach patient to arise slowly    Elimination Interventions: Bed/chair exit alarm, Call light in reach, Patient to call for help with toileting needs    History of Falls Interventions: Bed/chair exit alarm, Room close to nurse's station, Door open when patient unattended

## 2018-03-06 NOTE — ROUTINE PROCESS
CHF teaching reinforced with pt/ family @ BS, all have planners, scale @ home.  Emphasis to report worsening WT gain and dyspnea, will follow to reinforce: 15mins more

## 2018-03-06 NOTE — PROGRESS NOTES
[facility] CARDIOLOGY PROGRESS NOTE           3/6/2018 7:51 AM    Admit Date: 3/1/2018    Admit Diagnosis: COPD with acute exacerbation ([address] Acute on chronic combine*      Subjective:   Patient has had stable HRs and CHF symptoms. Objective:     Vitals:    03/05/18 2138 03/06/18 0019 03/06/18 0204 03/06/18 0417   BP:  98/68  95/65   Pulse:  81  93   Resp:  16  16   Temp:  97.5 °F (36.4 °C)  97.4 °F (36.3 °C)   SpO2: 97% 95% 96% 91%   Weight:    175 lb 6.4 oz (79.6 kg)   Height:           Physical Exam:  General-Well Developed, Well Nourished, No Acute Distress, Alert & Oriented x 3, appropriate mood. Neck- supple, no JVD  CV- regular rate and rhythm no MRG  Lung- clear bilaterally  Abd- soft, nontender, nondistended  Ext- no edema bilaterally.   Skin- warm and dry    Current Facility-Administered Medications   Medication Dose Route Frequency    metoprolol succinate (TOPROL-XL) XL tablet 50 mg  50 mg Oral BID    predniSONE (DELTASONE) tablet 20 mg  20 mg Oral DAILY    azithromycin (ZITHROMAX) tablet 250 mg  250 mg Oral DAILY    dilTIAZem CD (CARDIZEM CD) capsule 120 mg  120 mg Oral DAILY    albuterol-ipratropium (DUO-NEB) 2.5 MG-0.5 MG/3 ML  3 mL Nebulization Q6H RT    budesonide (PULMICORT) 500 mcg/2 ml nebulizer suspension  500 mcg Nebulization BID RT    insulin lispro (HUMALOG) injection   SubCUTAneous AC&HS    ALPRAZolam (XANAX) tablet 0.25 mg  0.25 mg Oral BID PRN    aspirin chewable tablet 81 mg  81 mg Oral DAILY    levETIRAcetam (KEPPRA) tablet 750 mg  750 mg Oral BID    montelukast (SINGULAIR) tablet 10 mg  10 mg Oral DAILY    rosuvastatin (CRESTOR) tablet 20 mg  20 mg Oral QHS    tiotropium (SPIRIVA) inhalation capsule 18 mcg  1 Cap Inhalation DAILY    bisacodyl (DULCOLAX) tablet 5 mg  5 mg Oral DAILY PRN    guaiFENesin-dextromethorphan (ROBITUSSIN DM) 100-10 mg/5 mL syrup 10 mL  10 mL Oral Q4H PRN    hydrALAZINE (APRESOLINE) 20 mg/mL injection 20 mg  20 mg IntraVENous Q6H PRN    polyethylene glycol (MIRALAX) packet 17 g  17 g Oral DAILY PRN    simethicone (MYLICON) tablet 80 mg  80 mg Oral QID PRN    sodium chloride (NS) flush 5-10 mL  5-10 mL IntraVENous Q8H    sodium chloride (NS) flush 5-10 mL  5-10 mL IntraVENous PRN    acetaminophen (TYLENOL) tablet 650 mg  650 mg Oral Q4H PRN    HYDROcodone-acetaminophen (NORCO) 5-325 mg per tablet 1 Tab  1 Tab Oral Q4H PRN    naloxone (NARCAN) injection 0.4 mg  0.4 mg IntraVENous PRN    diphenhydrAMINE (BENADRYL) injection 25 mg  25 mg IntraVENous Q4H PRN    ondansetron (ZOFRAN) injection 4 mg  4 mg IntraVENous Q4H PRN    senna-docusate (PERICOLACE) 8.6-50 mg per tablet 2 Tab  2 Tab Oral DAILY PRN    LORazepam (ATIVAN) tablet 1 mg  1 mg Oral Q4H PRN    dextrose 40% (GLUTOSE) oral gel 1 Tube  15 g Oral PRN    glucagon (GLUCAGEN) injection 1 mg  1 mg IntraMUSCular PRN    dextrose (D50W) injection syrg 12.5-25 g  25-50 mL IntraVENous PRN    albuterol (PROVENTIL VENTOLIN) nebulizer solution 2.5 mg  2.5 mg Nebulization Q4H PRN     Data Review:   Recent Results (from the past 24 hour(s))   EKG, 12 LEAD, INITIAL    Collection Time: 03/05/18  8:17 AM   Result Value Ref Range    Ventricular Rate 119 BPM    Atrial Rate 119 BPM    P-R Interval 136 ms    QRS Duration 112 ms    Q-T Interval 362 ms    QTC Calculation (Bezet) 509 ms    Calculated P Axis 37 degrees    Calculated R Axis -63 degrees    Calculated T Axis 100 degrees    Diagnosis       Supraventricular tachycardia  Left axis deviation  Minimal voltage criteria for LVH, may be normal variant  Anterior infarct , age undetermined  Abnormal ECG  When compared with ECG of 02-MAR-2018 09:45,  Premature ventricular complexes are no longer Present      Confirmed by DEE OSBORN (), Adarsh Doss ([id number]) on 3/5/2018 12:14:03 PM     GLUCOSE, POC    Collection Time: 03/05/18 10:47 AM   Result Value Ref Range    Glucose (POC) 256 (H) 65 - 100 mg/dL   GLUCOSE, POC    Collection Time: 03/05/18  3:34 PM   Result Value Ref Range    Glucose (POC) 182 (H) 65 - 100 mg/dL   GLUCOSE, POC    Collection Time: 03/05/18  9:29 PM   Result Value Ref Range    Glucose (POC) 251 (H) 65 - 958 mg/dL   METABOLIC PANEL, BASIC    Collection Time: 03/06/18  3:53 AM   Result Value Ref Range    Sodium 134 (L) 136 - 145 mmol/L    Potassium 4.1 3.5 - 5.1 mmol/L    Chloride 91 (L) 98 - 107 mmol/L    CO2 32 21 - 32 mmol/L    Anion gap 11 7 - 16 mmol/L    Glucose 341 (H) 65 - 100 mg/dL    BUN 42 (H) 8 - 23 MG/DL    Creatinine 2.44 (H) 0.8 - 1.5 MG/DL    GFR est AA 33 (L) >60 ml/min/1.73m2    GFR est non-AA 28 (L) >60 ml/min/1.73m2    Calcium 9.4 8.3 - 10.4 MG/DL   MAGNESIUM    Collection Time: 03/06/18  3:53 AM   Result Value Ref Range    Magnesium 2.2 1.8 - 2.4 mg/dL   CBC W/O DIFF    Collection Time: 03/06/18  3:53 AM   Result Value Ref Range    WBC 8.6 4.3 - 11.1 K/uL    RBC 5.20 4.23 - 5.67 M/uL    HGB 12.7 (L) 13.6 - 17.2 g/dL    HCT 38.2 (L) 41.1 - 50.3 %    MCV 73.5 (L) 79.6 - 97.8 FL    MCH 24.4 (L) 26.1 - 32.9 PG    MCHC 33.2 31.4 - 35.0 g/dL    RDW 15.2 (H) 11.9 - 14.6 %    PLATELET 560 836 - 144 K/uL    MPV Cannot be calculated 10.8 - 14.1 FL   GLUCOSE, POC    Collection Time: 03/06/18  6:36 AM   Result Value Ref Range    Glucose (POC) 236 (H) 65 - 100 mg/dL     Assessment:     Principal Problem:    Acute on chronic combined systolic and diastolic CHF (congestive heart failure) ([facility]) (3/1/2018)    Active Problems:    HTN (hypertension) (12/14/2014)      Type 2 diabetes mellitus with stage 3 chronic kidney disease, without long-term current use of insulin ([facility]) (7/1/2017)      COPD (chronic obstructive pulmonary disease) (HCC) (7/1/2017)      CAD (coronary artery disease) (12/14/2014)      Combined systolic and diastolic congestive heart failure (HCC) (7/1/2017)      Ischemic cardiomyopathy (7/1/2017)      COPD with acute exacerbation (HCC) (3/1/2018)      Stage 3 chronic kidney disease (3/1/2018)      Acute respiratory failure with hypoxia ([facility] (3/1/2018)      LFT elevation (3/1/2018)      Thrombocytopenia (HCC) (3/1/2018)      Atrial flutter with rapid ventricular response ([facility] [address] (3/1/2018)      Plan:   1. A. Flutter - Rates have stabilized. Will change Coreg to Toprol XL 50mg BID for better rate control. Will stop Dilt this afternoon. No Anti-coagulation  2. Acute on Chronic Sys CHF - Stable volume status. Will hold diuretics for 24 hrs. No ACEI/ARB. Plan for LifeVest or ICD before discharge   3. ARF - Hold Diuretics     Talya Galeas. Miladys Mendez M.D., F.A.C.C, F.H.R.S.   Cardiology/Electrophysiology

## 2018-03-06 NOTE — PROGRESS NOTES
PT Note:  Attempted PT 2x this afternoon. First time patient requested I return in an hour since he just got back to bed. Second attempt patient sleeping and when awakened refused PT. Will attempt another time/day as schedule permits.   Dagoberto Hernandez, PT, DPT

## 2018-03-07 PROBLEM — I42.9 CARDIOMYOPATHY (HCC): Status: ACTIVE | Noted: 2018-01-01

## 2018-03-07 NOTE — PROGRESS NOTES
Bedside and verbal report given to Jyoti Rick RN by Raissa Abernathy RN. Opportunity for questions given. Oncoming RN assumes all care of patient at this time.

## 2018-03-07 NOTE — PROGRESS NOTES
Bedside and Verbal shift change report given to Alesia Hutton RN (oncoming nurse) by self Eric Brumfield nurse). Report included the following information SBAR, Kardex, Intake/Output, MAR and Recent Results.

## 2018-03-07 NOTE — PROGRESS NOTES
[facility] CARDIOLOGY PROGRESS NOTE           3/7/2018 7:01 AM    Admit Date: 3/1/2018    Admit Diagnosis: COPD with acute exacerbation ([address] Acute on chronic combine*      Subjective:   No complaints this AM, no chest pain or shortness of breath    Interval History: (History of pertinent interval events obtained from nursing staff)  TTE (3/1/18): EF 10%  TTE (2016): 25%  TTE (2014): EF 30%    ROS:  GEN:  No fever or chills  Cardiovascular:  As noted above  Pulmonary:  As noted above  Neuro:  No new focal motor or sensory loss      Objective:     Vitals:    03/06/18 2109 03/07/18 0037 03/07/18 0207 03/07/18 0456   BP: 91/59 98/61  93/66   Pulse: 76 82  (!) 102   Resp: 18 18 18   Temp: 97.9 °F (36.6 °C) 98.3 °F (36.8 °C)  98.2 °F (36.8 °C)   SpO2: 96% 90% 91% 93%   Weight:    85.2 kg (187 lb 14.4 oz)   Height:           Physical Exam:  General-Well Developed, Well Nourished, No Acute Distress, Alert & Oriented x 3, appropriate mood. Neck- supple, no JVD  CV- regular rate and rhythm no MRG  Lung- clear bilaterally  Abd- soft, nontender, nondistended  Ext- no edema bilaterally.   Skin- warm and dry    Current Facility-Administered Medications   Medication Dose Route Frequency    metoprolol succinate (TOPROL-XL) XL tablet 50 mg  50 mg Oral BID    predniSONE (DELTASONE) tablet 20 mg  20 mg Oral DAILY    azithromycin (ZITHROMAX) tablet 250 mg  250 mg Oral DAILY    dilTIAZem CD (CARDIZEM CD) capsule 120 mg  120 mg Oral DAILY    albuterol-ipratropium (DUO-NEB) 2.5 MG-0.5 MG/3 ML  3 mL Nebulization Q6H RT    budesonide (PULMICORT) 500 mcg/2 ml nebulizer suspension  500 mcg Nebulization BID RT    insulin lispro (HUMALOG) injection   SubCUTAneous AC&HS    ALPRAZolam (XANAX) tablet 0.25 mg  0.25 mg Oral BID PRN    aspirin chewable tablet 81 mg  81 mg Oral DAILY    levETIRAcetam (KEPPRA) tablet 750 mg  750 mg Oral BID    montelukast (SINGULAIR) tablet 10 mg  10 mg Oral DAILY    rosuvastatin (CRESTOR) tablet 20 mg  20 mg Oral QHS    tiotropium (SPIRIVA) inhalation capsule 18 mcg  1 Cap Inhalation DAILY    bisacodyl (DULCOLAX) tablet 5 mg  5 mg Oral DAILY PRN    guaiFENesin-dextromethorphan (ROBITUSSIN DM) 100-10 mg/5 mL syrup 10 mL  10 mL Oral Q4H PRN    hydrALAZINE (APRESOLINE) 20 mg/mL injection 20 mg  20 mg IntraVENous Q6H PRN    polyethylene glycol (MIRALAX) packet 17 g  17 g Oral DAILY PRN    simethicone (MYLICON) tablet 80 mg  80 mg Oral QID PRN    sodium chloride (NS) flush 5-10 mL  5-10 mL IntraVENous Q8H    sodium chloride (NS) flush 5-10 mL  5-10 mL IntraVENous PRN    acetaminophen (TYLENOL) tablet 650 mg  650 mg Oral Q4H PRN    HYDROcodone-acetaminophen (NORCO) 5-325 mg per tablet 1 Tab  1 Tab Oral Q4H PRN    naloxone (NARCAN) injection 0.4 mg  0.4 mg IntraVENous PRN    diphenhydrAMINE (BENADRYL) injection 25 mg  25 mg IntraVENous Q4H PRN    ondansetron (ZOFRAN) injection 4 mg  4 mg IntraVENous Q4H PRN    senna-docusate (PERICOLACE) 8.6-50 mg per tablet 2 Tab  2 Tab Oral DAILY PRN    LORazepam (ATIVAN) tablet 1 mg  1 mg Oral Q4H PRN    dextrose 40% (GLUTOSE) oral gel 1 Tube  15 g Oral PRN    glucagon (GLUCAGEN) injection 1 mg  1 mg IntraMUSCular PRN    dextrose (D50W) injection syrg 12.5-25 g  25-50 mL IntraVENous PRN    albuterol (PROVENTIL VENTOLIN) nebulizer solution 2.5 mg  2.5 mg Nebulization Q4H PRN     Data Review:   Recent Results (from the past 24 hour(s))   GLUCOSE, POC    Collection Time: 03/06/18 11:17 AM   Result Value Ref Range    Glucose (POC) 212 (H) 65 - 100 mg/dL   PLEASE READ & DOCUMENT PPD TEST IN 72 HRS    Collection Time: 03/06/18  2:23 PM   Result Value Ref Range    PPD Negative Negative    mm Induration 0 mm   GLUCOSE, POC    Collection Time: 03/06/18  4:05 PM   Result Value Ref Range    Glucose (POC) 371 (H) 65 - 100 mg/dL   GLUCOSE, POC    Collection Time: 03/06/18  8:53 PM   Result Value Ref Range    Glucose (POC) 323 (H) 65 - 100 mg/dL METABOLIC PANEL, BASIC    Collection Time: 03/07/18  3:49 AM   Result Value Ref Range    Sodium 132 (L) 136 - 145 mmol/L    Potassium 3.7 3.5 - 5.1 mmol/L    Chloride 91 (L) 98 - 107 mmol/L    CO2 31 21 - 32 mmol/L    Anion gap 10 7 - 16 mmol/L    Glucose 214 (H) 65 - 100 mg/dL    BUN 49 (H) 8 - 23 MG/DL    Creatinine 1.84 (H) 0.8 - 1.5 MG/DL    GFR est AA 46 (L) >60 ml/min/1.73m2    GFR est non-AA 38 (L) >60 ml/min/1.73m2    Calcium 9.0 8.3 - 10.4 MG/DL   MAGNESIUM    Collection Time: 03/07/18  3:49 AM   Result Value Ref Range    Magnesium 2.5 (H) 1.8 - 2.4 mg/dL   GLUCOSE, POC    Collection Time: 03/07/18  6:04 AM   Result Value Ref Range    Glucose (POC) 217 (H) 65 - 100 mg/dL       EKG:  (EKG has been independently visualized by me with interpretation below)  Assessment:     Principal Problem:    Acute on chronic combined systolic and diastolic CHF (congestive heart failure) ([facility]) (3/1/2018)    Active Problems:    HTN (hypertension) (12/14/2014)      Type 2 diabetes mellitus with stage 3 chronic kidney disease, without long-term current use of insulin ([facility]) (7/1/2017)      COPD (chronic obstructive pulmonary disease) ([facility] (7/1/2017)      CAD (coronary artery disease) (12/14/2014)      Combined systolic and diastolic congestive heart failure (HCC) (7/1/2017)      Ischemic cardiomyopathy (7/1/2017)      COPD with acute exacerbation ([facility] [address] (3/1/2018)      Stage 3 chronic kidney disease (3/1/2018)      Acute respiratory failure with hypoxia ([facility]) (3/1/2018)      LFT elevation (3/1/2018)      Thrombocytopenia ([facility]) (3/1/2018)      Atrial flutter with rapid ventricular response ([facility] [address] (3/1/2018)      Plan:   1. A. Flutter - Rates around 100-110s at rest this AM. Cont Toprol XL 50mg BID for better rate control. No anticoagulation 2/2 ICH, plan for BiV ICD and AV node ablation for definitive rate control  2. Acute on Chronic Sys CHF, EF 10%, plan for AV node ablation, NYHA class III - Stable volume status.  Will hold diuretics for another 24 hrs then restart PO home diuretic. No ACEI/ARB secondary to renal function and hypotension. I had a long discussion today with the patient regarding the risks, benefits, and alternatives of a biventricular implantable cardiac rhythm device. I discussed in detail the potential benefits of ICD therapy, including improved mortality and prevention of SCD. Additionally, I discussed with the patient the potential risks of ICD implantation, including the risk of bleeding, infection, large blood vessel injury, lung or cardiac injury, venous occlusion, DVT/PE, pneumothorax, cardiac tamponade, perforation, need for urgent open heart surgery or additional procedures, device/lead failure, lead dislodgement, inappropriate shock(s), heart attack, stroke, arrhythmia, oversedation, respiratory arrest, and even death. We also discussed at length the indications for the addition of an LV lead for cardiac resynchronization in the setting of underlying conduction system disease and/or for the clinical suspicion for a high degree of ventricular pacing. We discussed not every patient has clinical improvement with a biventricular device, and implantation of a biventricular device does slightly increase the risks of the procedure. In addition, on rare occasions a patient's anatomy or underlying ventricular scar does not allow for successful placement of an LV lead or acceptable pacing parameters. Pt would like to proceed with BiV ICD and AV node ablation. 3. ARF - Hold Diuretics, improving    Chin Miranda MD  Cardiology/Electrophysiology

## 2018-03-07 NOTE — ANESTHESIA PREPROCEDURE EVALUATION
Anesthetic History   No history of anesthetic complications            Review of Systems / Medical History  Patient summary reviewed and pertinent labs reviewed    Pulmonary    COPD: mild        Asthma : well controlled       Neuro/Psych   Within defined limits          Comments: Chronic subdural hematoma  3/1/2018  Cardiovascular    Hypertension      CHF: NYHA Classification III  Dysrhythmias : atrial flutter  CAD, cardiac stents (IRMA 12/14 post CABG) and CABG (2004)    Exercise tolerance: <4 METS  Comments: Echo 3/1/18 ---leftventricle was severely dilated. Systolic function was severely reduced. Ejection fraction 10 % to 15 %.  severe diffuse  hypokinesis    GI/Hepatic/Renal         Renal disease: CRI  PUD     Endo/Other    Diabetes: well controlled, type 2         Other Findings            Physical Exam    Airway  Mallampati: II  TM Distance: 4 - 6 cm  Neck ROM: normal range of motion   Mouth opening: Normal     Cardiovascular  Regular rate and rhythm,  S1 and S2 normal,  no murmur, click, rub, or gallop             Dental         Pulmonary  Breath sounds clear to auscultation               Abdominal  GI exam deferred       Other Findings            Anesthetic Plan    ASA: 4, emergent  Anesthesia type: total IV anesthesia and general - backup          Induction: Intravenous  Anesthetic plan and risks discussed with: Patient and Family

## 2018-03-07 NOTE — PROGRESS NOTES
TRANSFER - IN REPORT:    Verbal report received from Alex, RN(name) on Soha Sterling  being received from telemetry room 321(unit) for ordered procedure      Report consisted of patients Situation, Background, Assessment and   Recommendations(SBAR). Information from the following report(s) SBAR was reviewed with the receiving nurse. Opportunity for questions and clarification was provided. Assessment completed upon patients arrival to unit and care assumed.

## 2018-03-07 NOTE — PROGRESS NOTES
Problem: Breathing Pattern - Ineffective  Goal: *Absence of hypoxia  Outcome: Progressing Towards Goal  Pt is on RA.  SAT: 98%, BBS: Diminished

## 2018-03-07 NOTE — PROGRESS NOTES
Attempted to follow up with patient and family and patient off floor and no family available.  Will attempt in am for fu

## 2018-03-07 NOTE — ACP (ADVANCE CARE PLANNING)
Reviewed his medical situation, and recommendations for ICD/life vest.  Pt states he is okay with proceeding with ICD placement. He also confirmed full code status. He does not have an Advance Directive. He has 2 adult children, and is not .

## 2018-03-07 NOTE — PROGRESS NOTES
Assisted patient to walk to bathroom and had monitor room watch heart rate on monitor per Dr. Gregg Alexandre. Patient's -115 while ambulating from bed to bathroom.

## 2018-03-07 NOTE — ANESTHESIA POSTPROCEDURE EVALUATION
Post-Anesthesia Evaluation and Assessment    Patient: Alyssa Fisher MRN: [id number]  SSN: [id number]    YOB: 1940  Age: 69 y.o. Sex: male       Cardiovascular Function/Vital Signs  Visit Vitals    /65    Pulse (!) 111    Temp 36.6 °C (97.9 °F)    Resp 12    Ht 6' 3\" (1.905 m)    Wt 85.2 kg (187 lb 14.4 oz)    SpO2 98%    BMI 23.49 kg/m2       Patient is status post total IV anesthesia, general - backup anesthesia for Procedure(s):  BIV ICD INSERTION, AV NODE ABLATION. Nausea/Vomiting: None    Postoperative hydration reviewed and adequate. Pain:  Pain Scale 1: Numeric (0 - 10) (03/07/18 1822)  Pain Intensity 1: 0 (03/07/18 1851)   Managed    Neurological Status:   Neuro  Neurologic State: Alert; Appropriate for age (03/07/18 0715)  Orientation Level: Oriented to person;Oriented to place;Oriented to situation;Disoriented to time (03/07/18 0715)  Cognition: Follows commands (03/07/18 0715)  Speech: Clear (03/07/18 0715)   At baseline    Mental Status and Level of Consciousness: Arousable    Pulmonary Status:   O2 Device: Nasal cannula (03/07/18 1851)   Adequate oxygenation and airway patent    Complications related to anesthesia: None    Post-anesthesia assessment completed.  No concerns    Signed By: Ricarda Emanuel MD     March 7, 2018

## 2018-03-07 NOTE — PROGRESS NOTES
Verbal bedside report received from [facility], RN. Assumed care of patient.

## 2018-03-07 NOTE — PROGRESS NOTES
Problem: Falls - Risk of  Goal: *Absence of Falls  Document Familia Fall Risk and appropriate interventions in the flowsheet. Outcome: Progressing Towards Goal  Fall Risk Interventions:  Discussed with patient the goal of no falls during hospital admission  Interventions list below. Call light in reach.     Mobility Interventions: Patient to call before getting OOB         Medication Interventions: Evaluate medications/consider consulting pharmacy    Elimination Interventions: Call light in reach    History of Falls Interventions: Bed/chair exit alarm

## 2018-03-07 NOTE — PROGRESS NOTES
Hospitalist Progress Note    Subjective:   Daily Progress Note: 3/7/2018 4:31 PM    Patient presented to ER 3/1/18 with complaints of productive cough of thick white sputum x 2 weeks, orthopnea, , wheezing, progressively worsening shortness of breath, and bilateral lower extremity edema that began am of presentation.  Denied recent or current illness or exposure, no fever.  Oxygen and duoneb en route per EMS with some relief.  Known COPD and asthma, CAD, CHF.  Heart rate is 123 on admission to ER. Lenice Curl and basilar rales noted.  +3 pitting edema to bilateral lower extremities, left >right.  Negative troponin. H/H: 12.1/36.5, platelets: 341, glucose: 173, creatinine: 1.63 with gfr: 44.  BNP: 2400, baseline 900.  No chest pain  Found in atrial flutter with RVR. Last EKG 9/2017 in [facility].  Admitted to tele with IV lasix, daily weights, mayorga, strict I+O, nebs, SSI.  Refused AICD in past.  2016 Echocardiogram 25-30% EF, holding home meds.  IV diltiazem begun.  Takes amiodarone at home. He was recently admitted with unstable angina.  Seen by Cardiology.  A1C: 8.1      Today,  At baseline SOB and occasional cough, feels better, on 1L O2    Current Facility-Administered Medications   Medication Dose Route Frequency    albuterol-ipratropium (DUO-NEB) 2.5 MG-0.5 MG/3 ML  3 mL Nebulization Q4H PRN    albuterol (PROVENTIL VENTOLIN) nebulizer solution 2.5 mg  2.5 mg Nebulization Q6H RT    metoprolol succinate (TOPROL-XL) XL tablet 50 mg  50 mg Oral BID    predniSONE (DELTASONE) tablet 20 mg  20 mg Oral DAILY    azithromycin (ZITHROMAX) tablet 250 mg  250 mg Oral DAILY    dilTIAZem CD (CARDIZEM CD) capsule 120 mg  120 mg Oral DAILY    budesonide (PULMICORT) 500 mcg/2 ml nebulizer suspension  500 mcg Nebulization BID RT    insulin lispro (HUMALOG) injection   SubCUTAneous AC&HS    ALPRAZolam (XANAX) tablet 0.25 mg  0.25 mg Oral BID PRN    aspirin chewable tablet 81 mg  81 mg Oral DAILY    levETIRAcetam (KEPPRA) tablet 750 mg  750 mg Oral BID    montelukast (SINGULAIR) tablet 10 mg  10 mg Oral DAILY    rosuvastatin (CRESTOR) tablet 20 mg  20 mg Oral QHS    tiotropium (SPIRIVA) inhalation capsule 18 mcg  1 Cap Inhalation DAILY    bisacodyl (DULCOLAX) tablet 5 mg  5 mg Oral DAILY PRN    guaiFENesin-dextromethorphan (ROBITUSSIN DM) 100-10 mg/5 mL syrup 10 mL  10 mL Oral Q4H PRN    hydrALAZINE (APRESOLINE) 20 mg/mL injection 20 mg  20 mg IntraVENous Q6H PRN    polyethylene glycol (MIRALAX) packet 17 g  17 g Oral DAILY PRN    simethicone (MYLICON) tablet 80 mg  80 mg Oral QID PRN    sodium chloride (NS) flush 5-10 mL  5-10 mL IntraVENous Q8H    sodium chloride (NS) flush 5-10 mL  5-10 mL IntraVENous PRN    acetaminophen (TYLENOL) tablet 650 mg  650 mg Oral Q4H PRN    naloxone (NARCAN) injection 0.4 mg  0.4 mg IntraVENous PRN    ondansetron (ZOFRAN) injection 4 mg  4 mg IntraVENous Q4H PRN    senna-docusate (PERICOLACE) 8.6-50 mg per tablet 2 Tab  2 Tab Oral DAILY PRN    dextrose 40% (GLUTOSE) oral gel 1 Tube  15 g Oral PRN    glucagon (GLUCAGEN) injection 1 mg  1 mg IntraMUSCular PRN    dextrose (D50W) injection syrg 12.5-25 g  25-50 mL IntraVENous PRN          Objective:     Visit Vitals    BP 92/67    Pulse (!) 113    Temp 97.9 °F (36.6 °C)    Resp 18    Ht 6' 3\" (1.905 m)    Wt 85.2 kg (187 lb 14.4 oz)    SpO2 95%    BMI 23.49 kg/m2    O2 Flow Rate (L/min): 2 l/min O2 Device: Room air    Temp (24hrs), Av °F (36.7 °C), Min:97.5 °F (36.4 °C), Max:98.3 °F (36.8 °C)    701 -  190  In: 0   Out: 950 [Urine:950]  1901 -  07  In: 530 [P.O.:530]  Out: 6487 [Urine:1665]    General appearance:  Oriented, but dozing most of conversation with sisters. Weak and frail. Cooperative, reports is feeling better,   Lungs: resolved exp wheez bilateral  Heart: Irregular rate and rhythm, S1, S2 normal, no murmur, click, rub or gallop  Abdomen: soft, non-tender.  Bowel sounds normal. No masses,  no organomegaly  Extremities: extremities normal, atraumatic, no cyanosis or edema  Skin: Skin color, texture, turgor normal. No rashes or lesions  Neurologic: Grossly normal      Additional comments:  Orders, notes, test results, vitals reviewed    Data Review  Recent Results (from the past 24 hour(s))   GLUCOSE, POC    Collection Time: 03/06/18 11:17 AM   Result Value Ref Range    Glucose (POC) 212 (H) 65 - 100 mg/dL   PLEASE READ & DOCUMENT PPD TEST IN 72 HRS    Collection Time: 03/06/18  2:23 PM   Result Value Ref Range    PPD Negative Negative    mm Induration 0 mm   GLUCOSE, POC    Collection Time: 03/06/18  4:05 PM   Result Value Ref Range    Glucose (POC) 371 (H) 65 - 100 mg/dL   GLUCOSE, POC    Collection Time: 03/06/18  8:53 PM   Result Value Ref Range    Glucose (POC) 323 (H) 65 - 610 mg/dL   METABOLIC PANEL, BASIC    Collection Time: 03/07/18  3:49 AM   Result Value Ref Range    Sodium 132 (L) 136 - 145 mmol/L    Potassium 3.7 3.5 - 5.1 mmol/L    Chloride 91 (L) 98 - 107 mmol/L    CO2 31 21 - 32 mmol/L    Anion gap 10 7 - 16 mmol/L    Glucose 214 (H) 65 - 100 mg/dL    BUN 49 (H) 8 - 23 MG/DL    Creatinine 1.84 (H) 0.8 - 1.5 MG/DL    GFR est AA 46 (L) >60 ml/min/1.73m2    GFR est non-AA 38 (L) >60 ml/min/1.73m2    Calcium 9.0 8.3 - 10.4 MG/DL   MAGNESIUM    Collection Time: 03/07/18  3:49 AM   Result Value Ref Range    Magnesium 2.5 (H) 1.8 - 2.4 mg/dL   GLUCOSE, POC    Collection Time: 03/07/18  6:04 AM   Result Value Ref Range    Glucose (POC) 217 (H) 65 - 100 mg/dL   GLUCOSE, POC    Collection Time: 03/07/18 10:32 AM   Result Value Ref Range    Glucose (POC) 190 (H) 65 - 100 mg/dL       CXR: 3/1/18:  Postoperative changes from CABG with enlarged cardiac silhouette. Left basal scarring is stable. No acute pulmonary edema or infiltrate. No pleural effusion or pneumothorax. IMPRESSION: Stable chest x-ray with chronic findings.  No acute abnormality.     3/1/18:  ABDOMINAL ULTRASOUND:  The liver, pancreas, spleen, and both kidneys are of normal size and echogenicity. The abdominal aorta, IVC, portal venous doppler studies are unremarkable. Impression: Bilateral renal cysts     ECHOCARDIOGRAM:  -  Left ventricle: The ventricle was moderately dilated. The ventricle was severely dilated. Systolic function was severely reduced. Ejection fraction  Was estimated in the range of 10 % to 15 %. SVi= 17.8. There was severe diffuse hypokinesis with regional variations. -  Ventricular septum: There was paradoxical motion. -  Right ventricle: The ventricle was dilated. Systolic function was markedly reduced. Assessment/Plan:     1- Ac on ch systolic CHF, ef 10, evaluation for possible ICD planned by card (today? Pt is NPO now), to follow. Improved   2- COPD exacerb, apparently still wheezing, added low dose of steroid and azithro.  Improved  3- HypoK and hypoMg, replaced as needed  4- Ac hypoxic resp failure dt #1-2, resolved    Dispo: home when w/u is finished w/ cardiology, possibly tomorrow    Signed By: Pacheco Hanna MD     March 7, 2018

## 2018-03-07 NOTE — PROGRESS NOTES
Palliative Care Progress Note    Patient: Shea Solo MRN: [id number]  SSN: [id number]    YOB: 1940  Age: 69 y.o. Sex: male       Assessment/Plan:     Chief Complaint/Interval History: Alert, states he is hungry       Principal Diagnosis:    · Debility, Unspecified  R53.81    Additional Diagnoses:   · Fatigue, Lethargy  R53.83  · Frailty  R54  · Counseling, Encounter for Medical Advice  Z71.9  · Encounter for Palliative Care  Z51.5    Palliative Performance Scale (PPS)  PPS: 60    Medical Decision Making:   Reviewed and summarized notes over last 24 hours  Reviewed laboratory and x-ray data- BMP    Pt alert, resting in bed. He is oriented x 4. Niece at bedside. Reviewed his medical situation, and recommendations for ICD/life vest.  Pt states he is okay with proceeding with ICD placement. He also confirmed full code status. He does not have an Advance Directive. He has 2 adult children, and is not . We discussed possible need for STR post discharge- he is agreeable. No further PC needs- we will sign off. Thank you for allowing us to participate in Mr Ramirez's care. More than 50% of this 35 minute visit was spent counseling and coordination of care as outlined above. Subjective:     Review of Systems:  Comprehensive review of systems was negative except for:  Constitutional: positive for fatigue      Objective:     Visit Vitals    BP 92/67    Pulse (!) 113    Temp 97.9 °F (36.6 °C)    Resp 18    Ht 6' 3\" (1.905 m)    Wt 85.2 kg (187 lb 14.4 oz)    SpO2 95%    BMI 23.49 kg/m2       Physical Exam:    General:  Cooperative. Debilitated. No distress noted    Eyes:  Conjunctivae/corneas clear    Nose: Nares normal. Septum midline.    Neck: Supple, symmetrical, trachea midline   Lungs:   Clear to auscultation bilaterally, unlabored   Heart:  Regular rate and rhythm   Abdomen:   Soft, non-tender, non-distended   Extremities: Normal, atraumatic, no cyanosis or edema Skin: Skin color, texture, turgor normal.   Neurologic: Nonfocal   Psych: Alert and oriented      Signed By: Doc Rodriguez NP     March 7, 2018

## 2018-03-07 NOTE — RESEARCH NURSE
Pt. Awake alert and oriented x4. Family at bedside. Patient approached for the [facility] MultiPoint Pacing Study at Dr. Gwendolyn Marley'  request.  ICF presented and reivewed in detail and left with patient to read and consider. After thorough review of the ICF and discussion with myself and Dr. Gwendolyn Marley, the patient was able to verbalize understanding of the study purpose, design, risks/benefits, alternatives and costs. Patient understands that study participation is voluntary and he can withdraw from the study at any time. Patient expressed his desire to participate in the [facility] study. Patient denies any questions or concerns at this time. ICF signatures were obtained prior to initiation of study procedures and a copy of the signed ICF provided to the patient and to the medical record.

## 2018-03-07 NOTE — PROCEDURES
Pre-Procedure Diagnosis  1. Chronic systolic heart failure, ejection fraction of 10%. 2. Combined ischemic and nonischemic dilated cardiomyopathy. 3. Class III heart failure symptoms. 4. Chronic systolic heart failure for a minimum of 3 months duration on optimal medical therapy. 5. Primary prevention indications for defibrillator  6. Life expectancy greater than 1 year. 7. Staged AV node ablation  8. Permanent atrial flutter/fibrillation with RVR, failing medical therapy    Procedure Performed  1. Insertion of [employer] biventricular implantable cardioverter defibrillator. 2. Insertion of left ventricular lead at time of new system Implantation. Anesthesia: MAC     Estimated Blood Loss: Less than 10 mL     Specimens: * No specimens in log *     The procedure, indications, risks, benefits, and alternatives were discussed with the patient and family members, who desired to proceed after questions were answered and informed consent was documented. Procedure: After informed consent was obtained, the patient was brought to the Electrophysiology Laboratory in a fasting state and was prepped and draped in sterile fashion. Prophylactic antibiotic was administered 10 minutes prior to skin incision (refer to anesthesia procedural documentation for details). MAC was administered by the anesthesia team with continuous oxygen saturation measurement and blood pressure measurement. Local anesthetic (sensorcaine) was delivered to the left pectoral region and an incision was made parallel to the deltopectoral groove. A subcutaneous pocket was created using blunt dissection and electrocautery, and adequate hemostasis was established. Access x 3 was obtained under ultrasound guidance using a modified Seldinger technique with placement of 2 short wires and a long wire down into the RA and advanced below the diaphragm. Next, placement of a peel-away sheath over the first guidewire was performed.   A permanent RV single coil ICD lead was then advanced under fluoroscopic guidance into the RV apex in a stable position with satisfactory sensing and pacing characteristics. The peel away sheath was removed. Another Safe-sheath was then placed over the second guidewire. A permanent pacing lead was advanced under fluoroscopic guidance and positioned in the right atrium at the location of the RAA. Stable RA appendage position with satisfactory sensing and pacing characteristics was obtained. The sheath was peeled. The leads were sutured to the underlying pectoralis fascia using 2 non-absorbable sutures around each lead's collar. The lead slack and position was assessed under fluoroscopy while securing the lead collars to ensure proper length. After positioning the RA and RV leads, a standard [employer] [place] LV delivery sheath was advanced over the long access wire and dilator and wire were removed. The braided inner sheath was then [facility] and used to cannulate the coronary sinus using contrast when necessary. A Glide wire and was used to allow a smooth transition into the CS body. A coronary sinus venogram was then performed using a balloon occluding catheter. A [employer] renal inner sheath was used with an Acuity Straight trak to cannulate a posterolateral branch. The renal was advanced into the posterolateral and a subselected venogram was performed. The left ventricular lead was then advanced into a posterolateral brach over an angioplasty wire. Adequate thresholds were obtained with an adequate margin between phrenic stim and loss of capture. The delivery sheaths were then slit with fluoroscopy demonstrating stable position. The lead was then sutured to the underlying pectoralis fascia using 2 non-absorbable sutures around the lead collar. Final lead testing via the pacing system analyzer (PSA) demonstrated stable sensing, impedance and pacing thresholds.  The lead pins were then cleaned with antibiotic soaked gauze, dried gently, and attached to a new defibrillator generator. Pins were directly observed to pass the tip electrode, and the ring hex wrench screws were secured, and leads tug tested. The pocket was irrigated copiously with a saline antimicrobial solution. The device and leads were gently positioned within the pocket. The wound was closed with multiple layers of absorbable suture followed by skin closure with staples. Fluoroscopic images were interpreted by me, in multiple projections. Final device position was confirmed by stored fluoroscopic image from device pocket to lead tips in AP projection. The patient tolerated the procedure well and left the lab in good condition.      Lead Data:    Device and Lead Information  Pulse Generator Model #  Serial # Location Implant   [id number] [id number] Left Pectoral Implant       Lead Model Number  Serial Number Lead position Implant   RA [id number] [facility] [id number] RA Appendage Implant     RV [id number] [facility] [id number] RV [place] Implant     LV 1458QL/86 [facility] [id number] LV Implant     Lead Sensitivity and Threshold  Lead R or P sensitivity (mv) Threshold (V) Threshold PW (msec) Impedance (ohms) Final output Voltage (V) Final PW (msec)   RA 3.7 AFL/AF  510 2.5 .50   RV >12 0.5 0.5 760 2.5 .50   LV  1.25 1.0 1275 2.0 1.0     Bradycardia Settings  Warren Mode LRL URL Pace AVD (ms) Sense AVD (ms) Rate Response Mode Switching Mode SW Rate   DDD 60 120 150 120 OFF        Tachycardia Settings  Zone Type VT-1 VT-2 VF   ON/OFF/  MONITOR ON OFF ON   Zone Rate 171  100 Intervals  222   1st Therapy Type ATP-burst x3  Shock   Energy (J) 85%  30   2nd Therapy Type Shock  Shock   Energy (J) 30  40   3rd Therapy Type Shock  Shock   Energy (J) 40  40   4th Therapy Type Shock  Shock   Energy (J) 40  40   5th Therapy Type Shock  Shock   Energy (J) 40  40   6th Therapy Type   Shock   Energy (J)   40     Defibrillation Threshold Testing  DFT# How induced Successful test? Shock Imped (ohms) Energy (J) Charge time (sec) Rescue needed? Defib threshold (J)                         Contrast: 15 ml    Fluoro Time:   05.5 minutes    Complications: None    IMPRESSION: Successful implantation of [employer] biventricular implantable cardioverter defibrillator for primary prevention of sudden death. Dusty Miranda MD, Ryan [address]  Clinical Cardiac Electrophysiology  3/7/2018  6:03 PM

## 2018-03-07 NOTE — PROGRESS NOTES
Bedside and verbal shift report given to Nick Polk RN by Andrea Casas RN. Opportunity for questions given. Oncoming RN assumes all care of patient at this time.

## 2018-03-07 NOTE — PROGRESS NOTES
TRANSFER - OUT REPORT:    Verbal report given to Solis Calvo RN on [address] [facility]  being transferred to [facility] for routine progression of care       Report consisted of patients Situation, Background, Assessment and Recommendations(SBAR). Information from the following report(s) SBAR, Kardex, [facility] and Recent Results was reviewed with the receiving nurse. Opportunity for questions and clarification was provided.

## 2018-03-07 NOTE — PROGRESS NOTES
Physical therapy note: Treatment attempted this PM. Pt in CCL. Will continue to see pt as he is able and time/schedule permit.

## 2018-03-08 NOTE — PROGRESS NOTES
Patient recovered from ICD implantation procedure in EP lab with EP staff. Patient AAOx3 and vitals signs returned to baseline. Dr. Renetta Warren called. He signed off and gave approval to send patient to the 3rd floor room 321. SBAR was reviewed with patient's receiving night nurse on 3 tele. Opportunity for questions and clarification was provided. Patient transported to floor by EP RN and EP tech.

## 2018-03-08 NOTE — DISCHARGE SUMMARY
Physician Discharge Summary       Patient: Jenny Ortega MRN: [id number]  SSN: [id number]    YOB: 1940  Age: 69 y.o.   Sex: male    PCP: Nando Dukes MD    Allergies: Mylanta [aluminum-magnesium hydroxide]    Admit date: 3/1/2018  Admitting Provider: Jose Irby MD    Discharge date: 3/8/2018  Discharging Provider: Pat Palacios MD    * Admission Diagnoses: COPD with acute exacerbation [facility])  Acute on chronic combined systolic and diastolic CHF (congestive heart failure) ([facility]  COPD with acute exacerbation (HCC)  Acute on chronic combined systolic and diastolic CHF (congestive heart failure) ([facility]  Afib ([facility]  CONGESTIVE HEART FAILURE  Cardiomyopathy ([facility]    * Discharge Diagnoses:    Hospital Problems as of 3/8/2018  Never Reviewed          Codes Class Noted - Resolved POA    Cardiomyopathy ([facility] ICD-10-CM: I42.9  ICD-9-CM: 425.4  3/7/2018 - Present Unknown        COPD with acute exacerbation ([facility] ICD-10-CM: J44.1  ICD-9-CM: 491.21  3/1/2018 - Present Yes        * (Principal)Acute on chronic combined systolic and diastolic CHF (congestive heart failure) ([facility] ICD-10-CM: I50.43  ICD-9-CM: 428.43, 428.0  3/1/2018 - Present Yes        Stage 3 chronic kidney disease (Chronic) ICD-10-CM: N18.3  ICD-9-CM: 585.3  3/1/2018 - Present Yes        Acute respiratory failure with hypoxia ([facility] ICD-10-CM: J96.01  ICD-9-CM: 518.81  3/1/2018 - Present Yes        LFT elevation ICD-10-CM: R79.89  ICD-9-CM: 790.6  3/1/2018 - Present Yes        Thrombocytopenia ([facility] ICD-10-CM: D69.6  ICD-9-CM: 287.5  3/1/2018 - Present Yes        Atrial flutter with rapid ventricular response ([address] ICD-10-CM: E86.51  ICD-9-CM: 427.32  3/1/2018 - Present Yes        Type 2 diabetes mellitus with stage 3 chronic kidney disease, without long-term current use of insulin (HCC) (Chronic) ICD-10-CM: E11.22, N18.3  ICD-9-CM: 250.40, 585.3  7/1/2017 - Present Yes        COPD (chronic obstructive pulmonary disease) [facility]) (Chronic) ICD-10-CM: J44.9  ICD-9-CM: 658  7/1/2017 - Present Yes        Combined systolic and diastolic congestive heart failure (HCC) (Chronic) ICD-10-CM: I50.40  ICD-9-CM: 428.40  7/1/2017 - Present Yes        Ischemic cardiomyopathy (Chronic) ICD-10-CM: I25.5  ICD-9-CM: 414.8  7/1/2017 - Present Yes        HTN (hypertension) (Chronic) ICD-10-CM: I10  ICD-9-CM: 401.9  12/14/2014 - Present Yes        CAD (coronary artery disease) (Chronic) ICD-10-CM: I25.10  ICD-9-CM: 414.00  12/14/2014 - Present Yes              * Hospital Course:     Mr. Judd Mooney is a 68 yo AAM who presented 3/1 for wheezing, lower extremity edema, productive cough and orthopnea and was admitted for acute systolic chf exacerbation and acute copd exacerbation along with aflutter with RVR. Started on diltizem drip and now on oral metoprolol for rate control. Echo revealed ef of 10-15% and he was taken to cath lab for AICD BiV device placement yesterday. He initially required iv lasix, scheduled nebulizers but is now tolerating po medications. He is on aspirin, Crestor, metoprolol and low dose lisinopril is being added (has bp limitations.)  Compliance with medications and follow up appointments has been emphasized. He has been encouraged to go to rehab but refuses and therefore will be discharged home with home health services. * Procedures:   Procedure(s):  BIV ICD INSERTION, AV NODE ABLATION  Cardiology and IR Orders (Through next 24h)    Start     Ordered    --  CARDIAC CATHETERIZATION  [[id number]]      03/08/18 1139        Consults: Cardiology, Palliative Care and Electrophysiology    Significant Diagnostic Studies:     Stat Abdominal Ultrasound: 3/2/2018  Indication: Thrombocytopenia and elevated LFTs  FINDINGS:  Gallbladder: Negative       wall: 1.8mm. CBD: 8.1mm. Liver: 14.8cm. Pancreas: Negative     Kidneys:     Right Kidney: 10.7cm. , 15 mm cyst    Left Kidney: 11.5cm. 19 mm cyst     Spleen: 10.9cm.      The liver, pancreas, spleen, and both kidneys are of normal size and  echogenicity. The abdominal aorta, IVC, portal venous doppler studies are  unremarkable.     IMPRESSION  Impression: Bilateral renal cysts      Transthoracic Echocardiogram  2D, M-mode, Doppler, and Color Doppler    Patient: Amrita Dukes  MR #: [id number]  : [id number]  Age: 69 years  Gender: Male  Study date: 02-Mar-2018  Account #: [de-identified]  Height: 75 in  Weight: 209.7 lb  BSA: 2.24 mï¾²  Status:Routine  Location: 321  BP: 132/ 88    Allergies: ALUMINUM-MAGNESIUM HYDROXIDE    Sonographer: Reanna Ruiz [facility]  Group:  [facility]  Referring Physician: Jaci Lindquist. Juan Villalba MD  Reading Physician: Kye Em MD [facility]    INDICATIONS: CHF. PROCEDURE: This was a routine study. A transthoracic echocardiogram was  performed. The study included complete 2D imaging, M-mode, complete spectral  Doppler, and color Doppler. Intravenous contrast (Definity) was administered. Image quality was adequate. LEFT VENTRICLE: The ventricle was moderately dilated. The ventricle was  severely dilated. Systolic function was severely reduced. Ejection fraction   was  estimated in the range of 10 % to 15 %. SVi= 17.8. There was severe diffuse  hypokinesis with regional variations. Wall thickness was normal.    VENTRICULAR SEPTUM: There was paradoxical motion. RIGHT VENTRICLE: The ventricle was dilated. Systolic function was markedly  reduced. Estimated peak pressure was in the range of 45-50 mmHg. LEFT ATRIUM: The atrium was moderately to markedly dilated. RIGHT ATRIUM: The atrium was moderately dilated. SYSTEMIC VEINS: IVC: The inferior vena cava was dilated. Respirophasic   changes  in dimension were absent. AORTIC VALVE: The valve was trileaflet. Leaflets exhibited mild sclerosis. There was no evidence for stenosis. There was no insufficiency. MITRAL VALVE: Valve structure was normal. There was no evidence for stenosis.   There was moderate regurgitation. TRICUSPID VALVE: The valve structure was normal. There was no evidence for  stenosis. There was moderate regurgitation. PULMONIC VALVE: The valve structure was normal. There was no evidence for  stenosis. There was trivial regurgitation. PERICARDIUM: There was no pericardial effusion. AORTA: The root exhibited normal size. SUMMARY:    -  Left ventricle: The ventricle was moderately dilated. The ventricle was  severely dilated. Systolic function was severely reduced. Ejection fraction   was  estimated in the range of 10 % to 15 %. SVi= 17.8. There was severe diffuse  hypokinesis with regional variations. -  Ventricular septum: There was paradoxical motion. -  Right ventricle: The ventricle was dilated. Systolic function was markedly  reduced. -  Left atrium: The atrium was moderately to markedly dilated. -  Right atrium: The atrium was moderately dilated. -  Inferior vena cava, hepatic veins: The inferior vena cava was dilated. Respirophasic changes in dimension were absent. -  Mitral valve: There was moderate regurgitation. -  Tricuspid valve: There was moderate regurgitation. SYSTEM MEASUREMENT TABLES    2D mode  AoR Diam (2D): 2.8 cm  LA Dimension (2D): 4.3 cm  Left Atrium Systolic Volume Index; Method of Disks, Biplane; 2D mode;: 41.2  ml/m2  IVS/LVPW (2D): 0.9  IVSd (2D): 1 cm  LVIDd (2D): 6.3 cm  LVIDs (2D): 5.6 cm  LVOT Area (2D): 3.1 cm2  LVPWd (2D): 1.1 cm  RVIDd (2D): 3.9 cm    Unspecified Scan Mode  Peak Grad; Mean; Antegrade Flow: 5 mm[Hg]  Vmax; Antegrade Flow: 116 cm/s  LVOT Diam: 2 cm  RVSP: 43 mm[Hg]    Prepared and signed by    Nicole Em MD [facility]  Signed 02-Mar-2018 14:09:39    Discharge Exam:    General: awake, alert, oriented, flat affect  Eyes; non icteric  Neck; supple  CV: IRIR, normal rate  Pulm; course, diminished in bases, no wheezing, non labored  Abd; soft, non tender  Ext: trace LE edema bilaterally, warm    * Discharge Condition: stable  * Disposition: Home    Discharge Medications:  Current Discharge Medication List      START taking these medications    Details   azithromycin (ZITHROMAX) 250 mg tablet Take 1 Tab by mouth daily for 3 days. Qty: 3 Tab, Refills: 0      metoprolol succinate (TOPROL-XL) 100 mg tablet Take 1 Tab by mouth two (2) times a day. Qty: 60 Tab, Refills: 0    Associated Diagnoses: Chronic combined systolic and diastolic congestive heart failure (HCC)      lisinopril (PRINIVIL, ZESTRIL) 2.5 mg tablet Take 1 Tab by mouth daily. Qty: 30 Tab, Refills: 0      fluticasone-salmeterol (ADVAIR) 250-50 mcg/dose diskus inhaler Take 1 Puff by inhalation two (2) times a day. Qty: 1 Inhaler, Refills: 0         CONTINUE these medications which have CHANGED    Details   furosemide (LASIX) 40 mg tablet Take 1 Tab by mouth daily. Indications: takes 1/2 pill on saturday and sunday, then 40 mg rest of the week. Qty: 30 Tab, Refills: 0         CONTINUE these medications which have NOT CHANGED    Details   levETIRAcetam (KEPPRA) 750 mg tablet Take 750 mg by mouth two (2) times a day. potassium chloride SR (KLOR-CON 10) 10 mEq tablet Take 20 mEq by mouth two (2) times a day. levothyroxine (SYNTHROID) 75 mcg tablet Take 75 mcg by mouth Daily (before breakfast). tiotropium (SPIRIVA WITH HANDIHALER) 18 mcg inhalation capsule Take 1 Cap by inhalation daily. Qty: 30 Cap, Refills: 11      rosuvastatin (CRESTOR) 20 mg tablet Take 20 mg by mouth nightly. montelukast (SINGULAIR) 10 mg tablet Take 10 mg by mouth daily. albuterol (VENTOLIN HFA) 90 mcg/actuation inhaler Take  by inhalation as needed for Wheezing. glimepiride (AMARYL) 2 mg tablet Take 2 mg by mouth every morning. aspirin 81 mg chewable tablet Take 1 tablet by mouth daily. nitroglycerin (NITROSTAT) 0.4 mg SL tablet by SubLINGual route every five (5) minutes as needed for Chest Pain.       ALPRAZolam (XANAX) 0.25 mg tablet Take 0.25 mg by mouth two (2) times daily as needed for Anxiety. STOP taking these medications       carvedilol (COREG) 25 mg tablet Comments:   Reason for Stopping:         losartan (COZAAR) 50 mg tablet Comments:   Reason for Stopping:         amiodarone (CORDARONE) 200 mg tablet Comments:   Reason for Stopping:               * Follow-up Care/Patient Instructions: Activity: as tolerated with assistance  Diet: low salt, diabetic    Follow-up Information     Follow up With Details Comments [address] [place] [address] Cardiology Go to see cardiologist on WED. , 14 MARCH, 2018 at 2:15PM. Please keep that important appointment. Phone: Sanaz Godinez MD Schedule an appointment as soon as possible for a visit in 2 days  [address] [place] [address]  [phone]          35 minutes spent in discharge planning and coordination of care.      Signed:  Franko Key MD  3/8/2018  1:54 PM

## 2018-03-08 NOTE — PROGRESS NOTES
Bedside and Verbal shift change report received from [place], RN. Report included the following information SBAR, Kardex, Procedure Summary, MAR, Recent Results and Cardiac Rhythm A-fib.

## 2018-03-08 NOTE — PROGRESS NOTES
Patient is disoriented to time and situation and has to be reminded frequently about complete bedrest and limited use of left arm. Patient is non compliant with bed rest orders, bed alarm on. Reminding patient to not use left arm. Sling in place. Will continue to monitor.

## 2018-03-08 NOTE — PROGRESS NOTES
Verbal and bedside report received from Juan and Aditi Avalos. Left subclavian site assessed at bedside.

## 2018-03-08 NOTE — PROGRESS NOTES
Spoke to Dr. Shakira Chávez about pt bp 95/71 in regards to new orders added by Dr. Metoprolol and lasix. Per MD zarate to give increased metoprolol dose as order with pt current bp. Will continue to monitor.

## 2018-03-08 NOTE — PROGRESS NOTES
Verbal bedside report given to [facility], oncoming RN. Patient's situation, background, assessment and recommendations provided. Opportunity for questions provided. Oncoming RN assumed care of patient. Left subclavian site visualized.

## 2018-03-08 NOTE — PROGRESS NOTES
Discharge instructions reviewed with patient and family at [facility]. Prescriptions given for see below and med info sheets provided for all new medications. Opportunity for questions provided. Patient voiced understanding of all discharge instructions. START taking these medications     Details   azithromycin (ZITHROMAX) 250 mg tablet Take 1 Tab by mouth daily for 3 days. Qty: 3 Tab, Refills: 0       metoprolol succinate (TOPROL-XL) 100 mg tablet Take 1 Tab by mouth two (2) times a day. Qty: 60 Tab, Refills: 0     Associated Diagnoses: Chronic combined systolic and diastolic congestive heart failure (HCC)       lisinopril (PRINIVIL, ZESTRIL) 2.5 mg tablet Take 1 Tab by mouth daily. Qty: 30 Tab, Refills: 0       fluticasone-salmeterol (ADVAIR) 250-50 mcg/dose diskus inhaler Take 1 Puff by inhalation two (2) times a day. Qty: 1 Inhaler, Refills: 0                 CONTINUE these medications which have CHANGED     Details   furosemide (LASIX) 40 mg tablet Take 1 Tab by mouth daily. Indications: takes 1/2 pill on saturday and sunday, then 40 mg rest of the week.   Qty: 30 Tab, Refills: 0

## 2018-03-08 NOTE — PROGRESS NOTES
Patient will going to [address] at [address] [facility] and phone number [phone]. Notified Marshall Cannon 13. harry Keene of this information.

## 2018-03-08 NOTE — PROGRESS NOTES
Verbal bedside report given to Valentin Valencia, oncoming RN's. Patient's situation, background, assessment and recommendations provided. Opportunity for questions provided. Oncoming RN assumed care of patient.

## 2018-03-08 NOTE — DISCHARGE INSTRUCTIONS
CARDIAC DEVICE INSTRUCTION SHEET    1. You should have received a 1-2 weeks follow up appointment upon discharge from the hospital.  If you did not receive this appointment prior to leaving the hospital, please contact us at [phone]. 2.  Keep your incision dry for 14 days. DO NOT put ointments, creams or lotions on the incision for 2 weeks. 3.  Your dressing will be removed at your follow up appointment. If you have sutures/staples, the nurse will remove them at the follow up appointment. 4.  Call us IMMEDIATELY if you develop fever, pain, redness, or drainage at the incision site. 5.  You may use your pacemaker/ICD arm, but keep your arm lower than your shoulder for the first 8 weeks (or until cleared by a physician) to prevent the device lead(s) from moving. 6.  Do not lift more than 10 pounds for 4 weeks and 20 pounds for 8 weeks. 7.  Within 6 weeks you should receive your cardiac device ID card. Carry your card with you at all times. Always show it to any doctor or dentist you see. 8.    You will need to have your device evaluated every 3 months via office, remote, or telephone. 9.  Microwaves WILL NOT harm your device. Warnings do not apply to you. 10.  At airports, always show your cardiac device ID card. You may walk through metal detectors but do not allow the hand held wand near your device. 11.  DO NOT have an MRI without contacting your cardiologists office first.     12.  Please refer to the education booklet given to you at the hospital for the activities and equipment you should avoid. Some may reprogram or interfere with your cardiac device. Heart Failure: Care Instructions  Your Care Instructions    Heart failure occurs when your heart does not pump as much blood as the body needs. Failure does not mean that the heart has stopped pumping but rather that it is not pumping as well as it should.  Over time, this causes fluid buildup in your lungs and other parts of your body. Fluid buildup can cause shortness of breath, fatigue, swollen ankles, and other problems. By taking medicines regularly, reducing sodium (salt) in your diet, checking your weight every day, and making lifestyle changes, you can feel better and live longer. Follow-up care is a key part of your treatment and safety. Be sure to make and go to all appointments, and call your doctor if you are having problems. It's also a good idea to know your test results and keep a list of the medicines you take. How can you care for yourself at home? Medicines  ? · Be safe with medicines. Take your medicines exactly as prescribed. Call your doctor if you think you are having a problem with your medicine. ? · Do not take any vitamins, over-the-counter medicine, or herbal products without talking to your doctor first. [place] not take ibuprofen (Advil or Motrin) and naproxen (Aleve) without talking to your doctor first. They could make your heart failure worse. ? · You may be taking some of the following medicine. ¨ Beta-blockers can slow heart rate, decrease blood pressure, and improve your condition. Taking a beta-blocker may lower your chance of needing to be hospitalized. ¨ Angiotensin-converting enzyme inhibitors (ACEIs) reduce the heart's workload, lower blood pressure, and reduce swelling. Taking an ACEI may lower your chance of needing to be hospitalized again. ¨ Angiotensin II receptor blockers (ARBs) work like ACEIs. Your doctor may prescribe them instead of ACEIs. ¨ Diuretics, also called water pills, reduce swelling. ¨ Potassium supplements replace this important mineral, which is sometimes lost with diuretics. ¨ Aspirin and other blood thinners prevent blood clots, which can cause a stroke or heart attack. ? You will get more details on the specific medicines your doctor prescribes. Diet  ? · Your doctor may suggest that you limit sodium to 2,000 milligrams (mg) a day or less. That is less than 1 teaspoon of salt a day, including all the salt you eat in cooking or in packaged foods. People get most of their sodium from processed foods. Fast food and restaurant meals also tend to be very high in sodium. ? · Ask your doctor how much liquid you can drink each day. You may have to limit liquids. ?Weight  ? · Weigh yourself without clothing at the same time each day. Record your weight. Call your doctor if you have a sudden weight gain, such as more than 2 to 3 pounds in a day or 5 pounds in a week. (Your doctor may suggest a different range of weight gain.) A sudden weight gain may mean that your heart failure is getting worse. ? Activity level  ? · Start light exercise (if your doctor says it is okay). Even if you can only do a small amount, exercise will help you get stronger, have more energy, and manage your weight and your stress. Walking is an easy way to get exercise. Start out by walking a little more than you did before. Bit by bit, increase the amount you walk. ? · When you exercise, watch for signs that your heart is working too hard. You are pushing yourself too hard if you cannot talk while you are exercising. If you become short of breath or dizzy or have chest pain, stop, sit down, and rest.   ? · If you feel \"wiped out\" the day after you exercise, walk slower or for a shorter distance until you can work up to a better pace. ? · Get enough rest at night. Sleeping with 1 or 2 pillows under your upper body and head may help you breathe easier. ? Lifestyle changes  ? · Do not smoke. Smoking can make a heart condition worse. If you need help quitting, talk to your doctor about stop-smoking programs and medicines. These can increase your chances of quitting for good. Quitting smoking may be the most important step you can take to protect your heart. ? · Limit alcohol to 2 drinks a day for men and 1 drink a day for women. Too much alcohol can cause health problems.    ? · Avoid getting sick from colds and the flu. Get a pneumococcal vaccine shot. If you have had one before, ask your doctor whether you need another dose. Get a flu shot each year. If you must be around people with colds or the flu, wash your hands often. When should you call for help? Call 911 if you have symptoms of sudden heart failure such as:  ? · You have severe trouble breathing. ? · You cough up pink, foamy mucus. ? · You have a new irregular or rapid heartbeat. ?Call your doctor now or seek immediate medical care if:  ? · You have new or increased shortness of breath. ? · You are dizzy or lightheaded, or you feel like you may faint. ? · You have sudden weight gain, such as more than 2 to 3 pounds in a day or 5 pounds in a week. (Your doctor may suggest a different range of weight gain.)   ? · You have increased swelling in your legs, ankles, or feet. ? · You are suddenly so tired or weak that you cannot do your usual activities. ? Watch closely for changes in your health, and be sure to contact your doctor if you develop new symptoms. Where can you learn more? Go to http://liz-jovanna.info/. Enter E668 in the search box to learn more about \"Heart Failure: Care Instructions. \"  Current as of: September 21, 2016  Content Version: 11.4  © 7935-1678 [employer]. Care instructions adapted under license by [employer] (which disclaims liability or warranty for this information). If you have questions about a medical condition or this instruction, always ask your healthcare professional. [place] [address] any warranty or liability for your use of this information.

## 2018-03-08 NOTE — PROGRESS NOTES
Problem: Mobility Impaired (Adult and Pediatric)  Goal: *Acute Goals and Plan of Care (Insert Text)  LTG:  (1.)Mr. Judd Mooney will move from supine to sit and sit to supine , scoot up and down and roll side to side in bed with MODIFIED INDEPENDENCE within 7 treatment day(s). (2.)Mr. Judd Mooney will transfer from bed to chair and chair to bed with SUPERVISION using the least restrictive device within 7 treatment day(s). (3.)Mr. Judd Mooney will ambulate with STAND BY ASSIST for >oz=235 feet with the least restrictive device, appropriate gait pattern and good dynamic standing balance within 7 treatment day(s). (4.)Mr. Judd Mooney will perform B LE therapeutic exercises x 20 reps with INDEPENDENCE within 7 days to increase strength for improved safety and independence in transfers and gait.  ________________________________________________________________________________________________      PHYSICAL THERAPY: Daily Note, Treatment Day: 1st, AM 3/8/2018  INPATIENT: Hospital Day: 8  Payor: [place] / Plan: [facility] HUMAN MEDICARE CHOICE PPO/PFFS / Product Type: [employer] Care Medicare /      NAME/AGE/GENDER: Padmaja Muller is a 69 y.o. male   PRIMARY DIAGNOSIS: COPD with acute exacerbation ([address]  Acute on chronic combined systolic and diastolic CHF (congestive heart failure) ([address]  COPD with acute exacerbation (HCC)  Acute on chronic combined systolic and diastolic CHF (congestive heart failure) (HCC)  Afib (HCC)  CONGESTIVE HEART FAILURE  Cardiomyopathy ([address] Acute on chronic combined systolic and diastolic CHF (congestive heart failure) (HCC) Acute on chronic combined systolic and diastolic CHF (congestive heart failure) (HCC)  Procedure(s) (LRB):  BIV ICD INSERTION, AV NODE ABLATION (N/A)  1 Day Post-Op  ICD-10: Treatment Diagnosis:   · Generalized Muscle Weakness (M62.81)  · Difficulty in walking, Not elsewhere classified (R26.2)  · Repeated Falls (R29.6)  · History of falling (Z91.81)   Precaution/Allergies:  Mylanta [aluminum-magnesium hydroxide]      ASSESSMENT:     Mr. Neel Miranda was supine on contact attempting to use urinal. Agreed to treatment and standing to urinate. He performed bed mobility and sat to EOB with CGA and additional time. He stood with min assist and was able to stand to urinate with CGA for balance. He sat EOB for ~ minute, then stood again and ambulated with close min assist ~ 150' with HHA. Left shoulder in sling from pace maker may be throwing his balance off as well as he was fairly groggy. He returned to chair in room and performed bilateral LE ex as listed below. Let in room with PCT present and needs in reach. Pt admits to having had several recent falls, one severe. Recommended pt use his straight cane to assist with safety and reduce risk of falls. Might even benefit from rolling walker until stronger. Will con't to follow to maximize function. This section established at most recent assessment   PROBLEM LIST (Impairments causing functional limitations):  1. Decreased Strength  2. Decreased ADL/Functional Activities  3. Decreased Transfer Abilities  4. Decreased Ambulation Ability/Technique  5. Decreased Balance  6. Decreased Activity Tolerance  7. Increased Shortness of Breath  8. Decreased [place] with Home Exercise Program   INTERVENTIONS PLANNED: (Benefits and precautions of physical therapy have been discussed with the patient.)  1. Balance Exercise  2. Bed Mobility  3. Family Education  4. Gait Training  5. Home Exercise Program (HEP)  6. Therapeutic Activites  7. Therapeutic Exercise/Strengthening  8. Transfer Training  9.  Group Therapy     TREATMENT PLAN: Frequency/Duration: 3 times a week for duration of hospital stay  Rehabilitation Potential For Stated Goals: Good     RECOMMENDED REHABILITATION/EQUIPMENT: (at time of discharge pending progress): Due to the probability of continued deficits (see above) this patient will likely need continued skilled physical therapy after discharge. Equipment:    will con't to assess              HISTORY:   History of Present Injury/Illness (Reason for Referral):  Per chart: Pt is a 70 y/o M with DM, cCHF, COPD, who presented to ER with SOB, cough semi-productive of whitish sputum, and worsening leg swelling. Says symptoms started roughly 3 days ago and progressively worsened. SOB worse with exertion, laying down. Better with rest.  Tachycardic in ER. BNP 2400, baseline possibly around 900 but limited data in our chart. Denies CP, palpitations, HA, fevers, chills, n/v, diaphoresis, urinary or bowel changes. Past Medical History/Comorbidities:   Mr. Kami Martinez  has a past medical history of Asthma; CAD (coronary artery disease); Chronic subdural hematoma ([address] (3/1/2018); COPD; Diabetes ([address] Heart failure ([address] Hypertension; and PUD (peptic ulcer disease). He also has no past medical history of Arthritis; Autoimmune disease ([address] Cancer ([address] Chronic kidney disease; DEMENTIA; Infectious disease; Liver disease; Psychiatric disorder; Seizures ([address] Sleep disorder; Stroke [facility]); or Thromboembolus ([address] Mr. Kami Martinez  has a past surgical history that includes pr cardiac surg procedure unlist.  Social History/Living Environment:   Home Environment: Private residence  # Steps to Enter: 0  Wheelchair Ramp: Yes  One/Two Story Residence: One story  Living Alone: Yes  Support Systems: Friends \ neighbors, Family member(s)  Patient Expects to be Discharged to[de-identified] Private residence  Current DME Used/Available at Home: Roya Milligan, zen, Walker, [address] [employer] chair  Tub or Shower Type: Shower  Prior Level of Function/Work/Activity:  Pt was independent with all functional mobility and gait without assistive device. Uses a straight cane occasionally. Has had multiple recent falls. Drives. Step-Daughter lives next door and helps pt as needed. Works cleaning buildings occasionally. One level home with ramp entry. Personal Factors:          Sex:  male        Age:  69 y.o. Number of Personal Factors/Comorbidities that affect the Plan of Care: 1-2: MODERATE COMPLEXITY   EXAMINATION:   Most Recent Physical Functioning:   Gross Assessment:                  Posture:  Posture (WDL): Exceptions to WDL  Posture Assessment: Forward head, Rounded shoulders  Balance:  Sitting: Impaired  Sitting - Static: Fair (occasional)  Sitting - Dynamic: Fair (occasional)  Standing - Static: Fair  Standing - Dynamic : Fair Bed Mobility:  Supine to Sit: Contact guard assistance; Additional time  Wheelchair Mobility:     Transfers:  Sit to Stand: Contact guard assistance  Stand to Sit: Contact guard assistance  Gait:     Base of Support: Center of gravity altered;Narrowed  Speed/Vanessa: Fluctuations; Slow  Gait Abnormalities: Decreased step clearance; Path deviations; Shuffling gait;Trunk sway increased  Distance (ft): 150 Feet (ft)  Ambulation - Level of Assistance: Minimal assistance; Moderate assistance      Body Structures Involved:  1. Heart Body Functions Affected:  1. Cardio Activities and Participation Affected:  1. General Tasks and Demands  2. Mobility  3. Domestic Life   Number of elements that affect the Plan of Care: 4+: HIGH COMPLEXITY   CLINICAL PRESENTATION:   Presentation: Evolving clinical presentation with changing clinical characteristics: MODERATE COMPLEXITY   CLINICAL DECISION MAKIN [facility] Inpatient Short Form  How much difficulty does the patient currently have. .. Unable A Lot A Little None   1. Turning over in bed (including adjusting bedclothes, sheets and blankets)? [] 1   [] 2   [x] 3   [] 4   2. Sitting down on and standing up from a chair with arms ( e.g., wheelchair, bedside commode, etc.)   [] 1   [] 2   [x] 3   [] 4   3. Moving from lying on back to sitting on the side of the bed? [] 1   [] 2   [x] 3   [] 4   How much help from another person does the patient currently need. .. Total A Lot A Little None   4.   Moving to and from a bed to a chair (including a wheelchair)? [] 1   [] 2   [x] 3   [] 4   5. Need to walk in hospital room? [] 1   [] 2   [x] 3   [] 4   6. Climbing 3-5 steps with a railing? [] 1   [x] 2   [] 3   [] 4   © 2007, Trustees of [address] [place] [address] [address] [address], under license to [employer]. All rights reserved      Score:  Initial: 17 Most Recent: X (Date: -- )    Interpretation of Tool:  Represents activities that are increasingly more difficult (i.e. Bed mobility, Transfers, Gait). Score 24 23 22-20 19-15 14-10 9-7 6     Modifier CH CI CJ CK CL CM CN      ? Mobility - Walking and Moving Around:     - CURRENT STATUS: CK - 40%-59% impaired, limited or restricted    - GOAL STATUS: CK - 40%-59% impaired, limited or restricted    - D/C STATUS:  ---------------To be determined---------------  Payor: [employer] MEDICARE / Plan: [facility] [employer] MEDICARE CHOICE PPO/PFFS / Product Type: Managed Care Medicare /      Medical Necessity:     · Patient demonstrates good rehab potential due to higher previous functional level. Reason for Services/Other Comments:  · Patient continues to require present interventions due to patient's inability to function at baseline. Use of outcome tool(s) and clinical judgement create a POC that gives a: Questionable prediction of patient's progress: MODERATE COMPLEXITY            TREATMENT:   (In addition to Assessment/Re-Assessment sessions the following treatments were rendered)   Pre-treatment Symptoms/Complaints:  Requesting to use urinal  Pain: Initial:   Pain Intensity 1: 8  Pain Location 1: Shoulder  Pain Orientation 1: Left  Post Session:  Unchanged     Therapeutic Activity: (    25 min): Therapeutic activities including bed mobility, standing balance, gt on level surface, LE ex to improve mobility, strength and balance. Required }min to promote dynamic balance in standing.       Date:  3/8/18 Date:   Date:     Activity/Exercise Seated Parameters Parameters Parameters   Heel raises X 10 B     Toe raises X 10 B     LAQ's X 10 B     Hip Flex X 10 B     Hip ABD                            Braces/Orthotics/Lines/Etc:   · left arm sling, heart moniter  · O2 Device: Room air  Treatment/Session Assessment:    · Response to Treatment:  Fatigued quickly, min SOB  · Interdisciplinary Collaboration:   o Physical Therapy Assistant  o Registered Nurse  o Certified Nursing Assistant/Patient Care Technician  · After treatment position/precautions:   o Up in chair  o Bed alarm/tab alert on  o Bed/Chair-wheels locked  o Bed in low position  o Call light within reach  o RN notified   · Compliance with Program/Exercises: Will assess as treatment progresses. · Recommendations/Intent for next treatment session: \"Next visit will focus on advancements to more challenging activities and reduction in assistance provided\".   Total Treatment Duration:  PT Patient Time In/Time Out  Time In: 1000  Time Out: [address] KENDRA Malik

## 2018-03-08 NOTE — PROGRESS NOTES
[facility] CARDIOLOGY PROGRESS NOTE           3/8/2018 7:51 AM    Admit Date: 3/1/2018    Admit Diagnosis: COPD with acute exacerbation ([address] Acute on chronic combine*      Subjective:   Patient has had stable HRs and CHF symptoms. Objective:     Vitals:    03/08/18 0249 03/08/18 0447 03/08/18 0835 03/08/18 0911   BP:  97/67 95/71    Pulse:  (!) 113 (!) 101    Resp:  18     Temp:  97.4 °F (36.3 °C)     SpO2: 95% 96%  95%   Weight:  186 lb 4.8 oz (84.5 kg)     Height:           Physical Exam:  General-Well Developed, Well Nourished, No Acute Distress, Alert & Oriented x 3, appropriate mood. Neck- supple, no JVD  CV- regular rate and rhythm no MRG  Lung- clear bilaterally  Abd- soft, nontender, nondistended  Ext- no edema bilaterally.   Skin- warm and dry    Current Facility-Administered Medications   Medication Dose Route Frequency    metoprolol succinate (TOPROL-XL) tablet 100 mg  100 mg Oral BID    furosemide (LASIX) tablet 40 mg  40 mg Oral DAILY    albuterol-ipratropium (DUO-NEB) 2.5 MG-0.5 MG/3 ML  3 mL Nebulization Q4H PRN    albuterol (PROVENTIL VENTOLIN) nebulizer solution 2.5 mg  2.5 mg Nebulization Q6H RT    sodium chloride (NS) flush 5-10 mL  5-10 mL IntraVENous Q8H    sodium chloride (NS) flush 5-10 mL  5-10 mL IntraVENous PRN    HYDROcodone-acetaminophen (NORCO) 5-325 mg per tablet 1 Tab  1 Tab Oral Q4H PRN    morphine injection 2 mg  2 mg IntraVENous Q4H PRN    predniSONE (DELTASONE) tablet 20 mg  20 mg Oral DAILY    azithromycin (ZITHROMAX) tablet 250 mg  250 mg Oral DAILY    dilTIAZem CD (CARDIZEM CD) capsule 120 mg  120 mg Oral DAILY    budesonide (PULMICORT) 500 mcg/2 ml nebulizer suspension  500 mcg Nebulization BID RT    insulin lispro (HUMALOG) injection   SubCUTAneous AC&HS    ALPRAZolam (XANAX) tablet 0.25 mg  0.25 mg Oral BID PRN    aspirin chewable tablet 81 mg  81 mg Oral DAILY    levETIRAcetam (KEPPRA) tablet 750 mg  750 mg Oral BID    montelukast (SINGULAIR) tablet 10 mg  10 mg Oral DAILY    rosuvastatin (CRESTOR) tablet 20 mg  20 mg Oral QHS    tiotropium (SPIRIVA) inhalation capsule 18 mcg  1 Cap Inhalation DAILY    bisacodyl (DULCOLAX) tablet 5 mg  5 mg Oral DAILY PRN    guaiFENesin-dextromethorphan (ROBITUSSIN DM) 100-10 mg/5 mL syrup 10 mL  10 mL Oral Q4H PRN    hydrALAZINE (APRESOLINE) 20 mg/mL injection 20 mg  20 mg IntraVENous Q6H PRN    polyethylene glycol (MIRALAX) packet 17 g  17 g Oral DAILY PRN    simethicone (MYLICON) tablet 80 mg  80 mg Oral QID PRN    sodium chloride (NS) flush 5-10 mL  5-10 mL IntraVENous Q8H    sodium chloride (NS) flush 5-10 mL  5-10 mL IntraVENous PRN    acetaminophen (TYLENOL) tablet 650 mg  650 mg Oral Q4H PRN    naloxone (NARCAN) injection 0.4 mg  0.4 mg IntraVENous PRN    ondansetron (ZOFRAN) injection 4 mg  4 mg IntraVENous Q4H PRN    senna-docusate (PERICOLACE) 8.6-50 mg per tablet 2 Tab  2 Tab Oral DAILY PRN    dextrose 40% (GLUTOSE) oral gel 1 Tube  15 g Oral PRN    glucagon (GLUCAGEN) injection 1 mg  1 mg IntraMUSCular PRN    dextrose (D50W) injection syrg 12.5-25 g  25-50 mL IntraVENous PRN     Data Review:   Recent Results (from the past 24 hour(s))   GLUCOSE, POC    Collection Time: 03/07/18 10:32 AM   Result Value Ref Range    Glucose (POC) 190 (H) 65 - 100 mg/dL   GLUCOSE, POC    Collection Time: 03/07/18  4:25 PM   Result Value Ref Range    Glucose (POC) 218 (H) 65 - 100 mg/dL   GLUCOSE, POC    Collection Time: 03/07/18  9:04 PM   Result Value Ref Range    Glucose (POC) 286 (H) 65 - 100 mg/dL   EKG, 12 LEAD, INITIAL    Collection Time: 03/07/18  9:38 PM   Result Value Ref Range    Ventricular Rate 112 BPM    Atrial Rate 112 BPM    P-R Interval 246 ms    QRS Duration 112 ms    Q-T Interval 288 ms    QTC Calculation (Bezet) 393 ms    Calculated P Axis 93 degrees    Calculated R Axis -68 degrees    Calculated T Axis -136 degrees    Diagnosis       Sinus tachycardia with 1st degree A-V block  Left axis deviation  Left ventricular hypertrophy with repolarization abnormality  Abnormal ECG  When compared with ECG of 05-MAR-2018 08:17,  [place] interval has increased  Criteria for Anterior infarct are no longer Present  ST now depressed in Inferior leads  T wave inversion now evident in Inferior leads  T wave inversion now evident in Anterior leads  Confirmed by [facility] KAYODE OSBORN (), JUSTIN HAIRSTON ([id number]) on 3/8/2018 9:37:95 AM     METABOLIC PANEL, BASIC    Collection Time: 03/08/18  3:49 AM   Result Value Ref Range    Sodium 134 (L) 136 - 145 mmol/L    Potassium 4.1 3.5 - 5.1 mmol/L    Chloride 95 (L) 98 - 107 mmol/L    CO2 29 21 - 32 mmol/L    Anion gap 10 7 - 16 mmol/L    Glucose 300 (H) 65 - 100 mg/dL    BUN 45 (H) 8 - 23 MG/DL    Creatinine 1.81 (H) 0.8 - 1.5 MG/DL    GFR est AA 47 (L) >60 ml/min/1.73m2    GFR est non-AA 39 (L) >60 ml/min/1.73m2    Calcium 8.7 8.3 - 10.4 MG/DL   MAGNESIUM    Collection Time: 03/08/18  3:49 AM   Result Value Ref Range    Magnesium 2.6 (H) 1.8 - 2.4 mg/dL   GLUCOSE, POC    Collection Time: 03/08/18  6:13 AM   Result Value Ref Range    Glucose (POC) 283 (H) 65 - 100 mg/dL     Assessment:     Principal Problem:    Acute on chronic combined systolic and diastolic CHF (congestive heart failure) ([facility]) (3/1/2018)    Active Problems:    HTN (hypertension) (12/14/2014)      Type 2 diabetes mellitus with stage 3 chronic kidney disease, without long-term current use of insulin ([facility]) (7/1/2017)      COPD (chronic obstructive pulmonary disease) ([facility] (7/1/2017)      CAD (coronary artery disease) (12/14/2014)      Combined systolic and diastolic congestive heart failure ([facility] (7/1/2017)      Ischemic cardiomyopathy (7/1/2017)      COPD with acute exacerbation ([facility] (3/1/2018)      Stage 3 chronic kidney disease (3/1/2018)      Acute respiratory failure with hypoxia ([facility]) (3/1/2018)      LFT elevation (3/1/2018)      Thrombocytopenia (HCC) (3/1/2018)      Atrial flutter with rapid ventricular response ([facility] (3/1/2018)      Cardiomyopathy ([facility] (3/7/2018)      Plan:   1. A. Flutter - Rates have stabilized. Increase Toprol XL to 100mg BID for better rate control. Will stop Dilt this. No Anti-coagulation  2. Acute on Chronic Sys CHF - Stable volume status. Restart lasix 40mg qam.    3. ARF - Stable    Wanhaylie Cordovas. Severiano Alma, M.D., F.A.C.C, F.H.R.S.   Cardiology/Electrophysiology

## 2018-03-08 NOTE — PROGRESS NOTES
[address]  Face to Face Encounter    Patients Name: Debbie Plasencia    YOB: 1940    Ordering Physician: Jorden Wolfe MD     Primary Diagnosis: COPD with acute exacerbation ([address]  Acute on chronic combined systolic and diastolic CHF (congestive heart failure) ([address]  COPD with acute exacerbation ([address]  Acute on chronic combined systolic and diastolic CHF (congestive heart failure) ([address]  Afib ([address]  CONGESTIVE HEART FAILURE  Cardiomyopathy [facility])    Date of Face to Face:   3/8/2018                                  Face to Face Encounter findings are related to primary reason for home care:   yes. 1. I certify that the patient needs intermittent care as follows: skilled nursing care:  skilled observation/assessment, patient education  physical therapy: strengthening  occupational therapy:  ADL safety (ie. cooking, bathing, dressing)    2. I certify that this patient is homebound, that is: 1) patient requires the use of a cane device, special transportation, or assistance of another to leave the home; and  2) patient's condition makes leaving the home medically contraindicated Patient may leave the home for infrequent and short duration for medical reasons, and occasional absences for non-medical reasons. Homebound status is due to the following functional limitations:     3. I certify that this patient is under my care and that I, or a nurse practitioner or  [id number], or clinical nurse specialist, or certified nurse midwife, working with me, had a Face-to-Face Encounter that meets the physician Face-to-Face Encounter requirements.   The following are the clinical findings from the [address] [place] [address] Street encounter that support the need for skilled services and is a summary of the encounter:     See Progress notes and D/C summary       Preeti Childers RN  3/8/2018      THE FOLLOWING TO BE COMPLETED BY THE COMMUNITY PHYSICIAN:    I concur with the findings described above from the [facility] encounter that this patient is homebound and in need of a skilled service.     Certifying Physician: _____________________________________      Printed Certifying Physician Name: _____________________________________    Date: _________________

## 2018-03-08 NOTE — ROUTINE PROCESS
CHF teaching started post introduction to pt/family; aware of diagnosis. Planner/scale @ BS and will follow. Smoking/ ETOH/Illicit drug use cessation and maintain a healthy weight covered. Pt/family aware that I can not prescribe nor adjust  medications: 15mins  Palliative Care score:  Start 2L/D Fluid restriction  CHF teaching continues to pt/family. Emphasis on taking prescription meds as ordered, to keep F/U appts and to call MD STAT. CHF teaching completed, verbalize emphasis on monitoring self and report to MD:   If you gain 2 lbs in one day or 5 lbs in a week, and short of breath.  If you can not lay flat without developing short of breath or rapid breathing at night; or if it wakes you up. Develop a cough or wheezing.  If you notice swollen hands/feet/ankles or stomach with a bloated/ full feeling.  If you be  ome confused or mentally fuzzy or dizzy.  If you notice a rapid or change in your heart rate.  If you become more exhausted all the time and unable to do the same level of activity without stopping to catch your breath. Drink no more than 8 cups a day in 8 oz. cups. Limit Cola Drinks. Your Heart can not handle any more. Stay away from salt (limit anything with salt or sodium in it). Limit to 250mg per serving. Exercise needs to be started with your Doctors approval.  Reduce stress; Call myself or Provider if assistance is needed. Pass post test via teach back, will make self available post DC ,if an questions arise. Diabetic teaching completed. Planner/scale @ BS:  60 mins total    Reinforced with daughter @ BS that provides Meds @ home.

## 2018-03-08 NOTE — PROGRESS NOTES
Care Management Interventions  PCP Verified by CM: Yes  Transition of Care Consult (CM Consult): [address]: Yes  Physical Therapy Consult: Yes  Current Support Network: Lives Alone  Confirm Follow Up Transport: Family  Plan discussed with Pt/Family/Caregiver: Yes  Freedom of Choice Offered: Yes  Discharge Location  Discharge Placement: Home with home health  Patient to be d/c per Dr. Ricardo Qiu. Patient has declined rehab recommendations and insisting to return home. He has agreed to home health. Discussed home health agencies for [facility] and [facility] Sister in law in the room will transport patient home. Noted patient on room air at present with O2 sat recorded at 95%. Patient to be discharged home with home health.

## 2018-03-08 NOTE — PROGRESS NOTES
MEWS score noted to be 4. Charge nurse, Florinda Villarreal, DANIELLE informed and assessed patient. High MEWS score noted due to , rhythm a flutter. Will continue to monitor.

## 2018-03-09 NOTE — TELEPHONE ENCOUNTER
[address] [place] [address] Kati Pruettia Pharmacist consult. Mr. Robert Franks was discharged yesterday prior to my consult. I called and spoke with a female who told me Mr. Robert Franks was asleep. I explained the reason for my call. She said his daughter Ryan Parker handled all his medication and asked me to call her. I called [employer] and got voice mail. I left my name and cell phone number. I asked her to call me with any medication questions or issue.   3/9/18  [id number]

## 2018-03-09 NOTE — PROGRESS NOTES
Transition of Care Discharge Follow-up Questionnaire   Date/Time of Call:   March 9, 2018 1:00PM   What was the patient hospitalized for? 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 Does the patient understand his/her diagnosis and/or treatment and what happened during the hospitalization? Patient's sister in law states understanding of patient's diagnosis and treatment plan during hospitalization. Did the patient receive discharge instructions? Yes     Review any discharge instructions (see notes in [facility]). Ask patient if they understand these. Do they have any questions? Patient's sister in law states understanding of patient discharge instructions, patient's sister in law states no questions. Were home services ordered (nursing, PT, OT, ST, etc.)? Yes, home health services ordered          If so, has the first visit occurred? If not, why? (Assist with coordination of services if necessary.) No, patient's sister in law states home visit was scheduled for today but was cancelled due to person coming from home health agency being allergic to animals,and a small dog lives in patient's home. Patient's sister in law states visit scheduled for tomorrow 03/10/2018. Was any DME ordered? No durable medical equipment ordered. If so, has it been received? If not, why?  (Assist with coordination of arranging DME orders if necessary. ) NA         Complete a review of all medications (new, continued and discontinued meds per the D/C instructions and medication tab in [facility]). Patient's sister in law states patient's daughter handles medications and declined medication review. Were all new prescriptions filled? If not, why?  (Assist with obtainment of medications if necessary.) No, patient's sister in law states patient's daughter will  new prescriptions today.      Does the patient understand the purpose and dosing instructions for all medications? (If patient has questions, provide explanation and education.) Patient states understanding of current medications and dosing instructions. Care Coordinator educated patient on the importance of medication compliance and reporting medication side effects to PCP/Specialist.      Does the patient have any problems in performing ADLs? (If patient is unable to perform ADLs  what is the limiting factor(s)? Do they have a support system that can assist? If no support system is present, discuss possible assistance that they may be able to obtain.) Patient's sister in law states patient is independent with ADL's and ambulation and has a Walker. Patient's sister in law states she and patient's daughter assist patient as needed. Does the patient have all follow-up appointments scheduled? 7 day f/up with PCP?    7-14 day f/up with specialist?    If f/up has not been made  what actions has the care coordinator made to accomplish this? Has transportation been arranged? [facility] follow-up should be within 7 days of discharge; all others should have PCP follow-up within 7 days of discharge; follow-ups with other specialists should be within 7-14 days of discharge.) Care Coordinator educated patient's sister in law on the importance of scheduling follow up appointment with patient's PCP within 7 to 14 days of hospital discharge. Patient's sister in law states that his daughter [facility] schedules appointments and that appointment with PCP has been made. Patient's sister in law states she does not know date/time of appointment. Yes      [facility], March 14, 2018 @ 2:15PM   Any other questions or concerns expressed by the patient? No further needs identified, patient instructed to call Care Coordinator if further questions or concerns arise.  Patient informed of the importance of compliance with follow up appointments with PCP/Specialist.     Schedule next appointment with [facility] Coordinator or refer to RN Case Manager/  per the workflow guidelines. When is care coordinators next follow-up call scheduled? If referred for CCM  what RN care manager was the referral assigned? NA          NA      NA             [facility] Call Completed By: JACK Larson [facility] ACO  Care Coordinator     This note will not be viewable in [address]

## 2018-03-25 PROCEDURE — 3331090002 HH PPS REVENUE DEBIT

## 2018-03-25 PROCEDURE — 3331090001 HH PPS REVENUE CREDIT

## 2018-03-26 ENCOUNTER — HOME CARE VISIT (OUTPATIENT)
Dept: HOME HEALTH SERVICES | Facility: HOME HEALTH | Age: 78
End: 2018-03-26
Payer: MEDICARE

## 2018-03-26 ENCOUNTER — TELEPHONE (OUTPATIENT)
Dept: HOME HEALTH SERVICES | Facility: HOME HEALTH | Age: 78
End: 2018-03-26

## 2018-03-26 VITALS
DIASTOLIC BLOOD PRESSURE: 78 MMHG | HEART RATE: 82 BPM | SYSTOLIC BLOOD PRESSURE: 132 MMHG | RESPIRATION RATE: 18 BRPM | TEMPERATURE: 97.2 F

## 2018-03-26 PROCEDURE — 3331090002 HH PPS REVENUE DEBIT

## 2018-03-26 PROCEDURE — 3331090001 HH PPS REVENUE CREDIT

## 2018-03-27 PROCEDURE — 3331090001 HH PPS REVENUE CREDIT

## 2018-03-27 PROCEDURE — 3331090002 HH PPS REVENUE DEBIT

## 2018-03-28 PROCEDURE — 3331090001 HH PPS REVENUE CREDIT

## 2018-03-28 PROCEDURE — 3331090002 HH PPS REVENUE DEBIT
